# Patient Record
Sex: FEMALE | Race: WHITE | HISPANIC OR LATINO | Employment: FULL TIME | URBAN - METROPOLITAN AREA
[De-identification: names, ages, dates, MRNs, and addresses within clinical notes are randomized per-mention and may not be internally consistent; named-entity substitution may affect disease eponyms.]

---

## 2021-10-01 ENCOUNTER — OFFICE VISIT (OUTPATIENT)
Dept: INTERNAL MEDICINE | Facility: CLINIC | Age: 26
End: 2021-10-01

## 2021-10-01 VITALS
BODY MASS INDEX: 43.54 KG/M2 | HEART RATE: 74 BPM | DIASTOLIC BLOOD PRESSURE: 86 MMHG | RESPIRATION RATE: 18 BRPM | SYSTOLIC BLOOD PRESSURE: 118 MMHG | TEMPERATURE: 97.5 F | HEIGHT: 64 IN | WEIGHT: 255 LBS | OXYGEN SATURATION: 98 %

## 2021-10-01 DIAGNOSIS — Z00.00 ANNUAL PHYSICAL EXAM: Primary | ICD-10-CM

## 2021-10-01 DIAGNOSIS — F90.0 ATTENTION DEFICIT HYPERACTIVITY DISORDER (ADHD), PREDOMINANTLY INATTENTIVE TYPE: ICD-10-CM

## 2021-10-01 DIAGNOSIS — F41.1 GENERALIZED ANXIETY DISORDER: ICD-10-CM

## 2021-10-01 DIAGNOSIS — Z11.59 NEED FOR HEPATITIS C SCREENING TEST: ICD-10-CM

## 2021-10-01 DIAGNOSIS — J45.901 ASTHMA WITH ACUTE EXACERBATION, UNSPECIFIED ASTHMA SEVERITY, UNSPECIFIED WHETHER PERSISTENT: ICD-10-CM

## 2021-10-01 PROBLEM — G89.29 CHRONIC BACK PAIN: Status: ACTIVE | Noted: 2021-10-01

## 2021-10-01 PROBLEM — J45.909 ASTHMA: Status: ACTIVE | Noted: 2017-08-28

## 2021-10-01 PROBLEM — M54.9 CHRONIC BACK PAIN: Status: ACTIVE | Noted: 2021-10-01

## 2021-10-01 PROBLEM — F43.10 PTSD (POST-TRAUMATIC STRESS DISORDER): Status: ACTIVE | Noted: 2018-04-18

## 2021-10-01 PROBLEM — F32.A DEPRESSION: Status: ACTIVE | Noted: 2018-04-18

## 2021-10-01 PROBLEM — F90.9 ADHD: Status: ACTIVE | Noted: 2018-04-18

## 2021-10-01 PROCEDURE — 99385 PREV VISIT NEW AGE 18-39: CPT | Performed by: NURSE PRACTITIONER

## 2021-10-01 PROCEDURE — 99204 OFFICE O/P NEW MOD 45 MIN: CPT | Performed by: NURSE PRACTITIONER

## 2021-10-01 RX ORDER — SUMATRIPTAN 50 MG/1
50 TABLET, FILM COATED ORAL
COMMUNITY
Start: 2020-12-10 | End: 2021-12-10

## 2021-10-01 RX ORDER — TIOTROPIUM BROMIDE INHALATION SPRAY 3.12 UG/1
SPRAY, METERED RESPIRATORY (INHALATION)
COMMUNITY
Start: 2021-06-30 | End: 2021-10-01 | Stop reason: SDUPTHER

## 2021-10-01 RX ORDER — BUPROPION HYDROCHLORIDE 150 MG/1
150 TABLET ORAL DAILY
Qty: 30 TABLET | Refills: 3 | Status: SHIPPED | OUTPATIENT
Start: 2021-10-01 | End: 2021-10-13 | Stop reason: SINTOL

## 2021-10-01 RX ORDER — TIOTROPIUM BROMIDE INHALATION SPRAY 3.12 UG/1
2 SPRAY, METERED RESPIRATORY (INHALATION) DAILY
Qty: 4 G | Refills: 12 | Status: SHIPPED | OUTPATIENT
Start: 2021-10-01

## 2021-10-01 RX ORDER — MONTELUKAST SODIUM 10 MG/1
TABLET ORAL
COMMUNITY
Start: 2021-09-30 | End: 2021-10-01 | Stop reason: SDUPTHER

## 2021-10-01 RX ORDER — MONTELUKAST SODIUM 10 MG/1
10 TABLET ORAL NIGHTLY
Qty: 90 TABLET | Refills: 3 | Status: SHIPPED | OUTPATIENT
Start: 2021-10-01 | End: 2023-02-21

## 2021-10-01 NOTE — PATIENT INSTRUCTIONS
Dr. James Schilling  [address]   [address]   [place] [address]   Phone [phone]     Dr. Tej Shea  [address] [place] [address]   Phone [phone]      Integrative Psychiatry   Dr. Eugenio Woodruff  [address]  [place] [address]  [phone]    Dr. Boogie Tanner  [address]  [place], [place] [address] [phone]     [facility]   [address]   [address]   [place] [address]   Phone [phone]    Dr. Emeterio Mcintosh  [address]  [address]   [place] [address]   Phone [phone]   Fax [phone]     [facility]   [address]  [place] [address]  [phone]    [facility]  [address] [place] [id number] [phone]    Dr. Massiel Delgado  [address]   [address]   [place] [address]   Phone [phone]   Fax [phone]     Dr. Maribell Oswald  [address]  [place], [place] [id number] [phone]    Dr. Abhinav Aponte  [address]   [place] [address]   Phone [phone]   Fax [phone]     [facility]  [address], [place] [address] [phone]    [facility]
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
## 2021-10-04 ENCOUNTER — HOSPITAL ENCOUNTER (EMERGENCY)
Facility: HOSPITAL | Age: 26
Discharge: HOME | End: 2021-10-04
Attending: EMERGENCY MEDICINE | Admitting: EMERGENCY MEDICINE

## 2021-10-04 ENCOUNTER — APPOINTMENT (OUTPATIENT)
Dept: GENERAL RADIOLOGY | Facility: HOSPITAL | Age: 26
End: 2021-10-04

## 2021-10-04 VITALS
HEIGHT: 65 IN | TEMPERATURE: 97.7 F | OXYGEN SATURATION: 98 % | RESPIRATION RATE: 16 BRPM | HEART RATE: 80 BPM | WEIGHT: 255 LBS | DIASTOLIC BLOOD PRESSURE: 74 MMHG | SYSTOLIC BLOOD PRESSURE: 118 MMHG | BODY MASS INDEX: 42.49 KG/M2

## 2021-10-04 DIAGNOSIS — M54.41 LOW BACK PAIN WITH BILATERAL SCIATICA, UNSPECIFIED BACK PAIN LATERALITY, UNSPECIFIED CHRONICITY: Primary | ICD-10-CM

## 2021-10-04 DIAGNOSIS — M54.42 LOW BACK PAIN WITH BILATERAL SCIATICA, UNSPECIFIED BACK PAIN LATERALITY, UNSPECIFIED CHRONICITY: Primary | ICD-10-CM

## 2021-10-04 PROCEDURE — 99283 EMERGENCY DEPT VISIT LOW MDM: CPT

## 2021-10-04 PROCEDURE — 72110 X-RAY EXAM L-2 SPINE 4/>VWS: CPT

## 2021-10-04 PROCEDURE — 63710000001 PREDNISONE PER 1 MG: Performed by: PHYSICIAN ASSISTANT

## 2021-10-04 RX ORDER — CYCLOBENZAPRINE HCL 10 MG
10 TABLET ORAL ONCE
Status: COMPLETED | OUTPATIENT
Start: 2021-10-04 | End: 2021-10-04

## 2021-10-04 RX ORDER — PREDNISONE 20 MG/1
40 TABLET ORAL ONCE
Status: COMPLETED | OUTPATIENT
Start: 2021-10-04 | End: 2021-10-04

## 2021-10-04 RX ORDER — ACETAMINOPHEN 500 MG
1000 TABLET ORAL ONCE
Status: COMPLETED | OUTPATIENT
Start: 2021-10-04 | End: 2021-10-04

## 2021-10-04 RX ORDER — METHYLPREDNISOLONE 4 MG/1
TABLET ORAL
Qty: 21 TABLET | Refills: 0 | Status: SHIPPED | OUTPATIENT
Start: 2021-10-04 | End: 2021-11-05

## 2021-10-04 RX ADMIN — CYCLOBENZAPRINE 10 MG: 10 TABLET, FILM COATED ORAL at 12:26

## 2021-10-04 RX ADMIN — PREDNISONE 40 MG: 20 TABLET ORAL at 12:27

## 2021-10-04 RX ADMIN — ACETAMINOPHEN 1000 MG: 500 TABLET ORAL at 12:27

## 2021-10-04 NOTE — ED TRIAGE NOTES
Pt has chronic back pain and is seen by pain management.  Her pain is worse and she can't sit or walk.  Pain management told her to come to ER.  No new injury    Patient was placed in face mask during first look triage.  Patient was wearing a face mask throughout encounter.  I wore personal protective equipment throughout the encounter.  Hand hygiene was performed before and after patient encounter.
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
## 2021-10-04 NOTE — DISCHARGE INSTRUCTIONS
Please continue to take your previously prescribed Tylenol and Robaxin for your back pain. Take the Medrol dose pack as prescribed. Light activity for the next 3 to 5 days. It is important that you follow-up with your primary care provider later this week if your pain continues.

## 2021-10-05 LAB
ALBUMIN SERPL-MCNC: 4.6 G/DL (ref 3.5–5.2)
ALBUMIN/GLOB SERPL: 1.6 G/DL
ALP SERPL-CCNC: 60 U/L (ref 39–117)
ALT SERPL-CCNC: 44 U/L (ref 1–33)
AST SERPL-CCNC: 29 U/L (ref 1–32)
BASOPHILS # BLD AUTO: 0.03 10*3/MM3 (ref 0–0.2)
BASOPHILS NFR BLD AUTO: 0.4 % (ref 0–1.5)
BILIRUB SERPL-MCNC: 0.2 MG/DL (ref 0–1.2)
BUN SERPL-MCNC: 11 MG/DL (ref 6–20)
BUN/CREAT SERPL: 15.5 (ref 7–25)
CALCIUM SERPL-MCNC: 9.7 MG/DL (ref 8.6–10.5)
CHLORIDE SERPL-SCNC: 100 MMOL/L (ref 98–107)
CHOLEST SERPL-MCNC: 115 MG/DL (ref 0–200)
CO2 SERPL-SCNC: 26 MMOL/L (ref 22–29)
CREAT SERPL-MCNC: 0.71 MG/DL (ref 0.57–1)
EOSINOPHIL # BLD AUTO: 0.16 10*3/MM3 (ref 0–0.4)
EOSINOPHIL NFR BLD AUTO: 2.3 % (ref 0.3–6.2)
ERYTHROCYTE [DISTWIDTH] IN BLOOD BY AUTOMATED COUNT: 13.5 % (ref 12.3–15.4)
GLOBULIN SER CALC-MCNC: 2.8 GM/DL
GLUCOSE SERPL-MCNC: 94 MG/DL (ref 65–99)
HCT VFR BLD AUTO: 40.8 % (ref 34–46.6)
HCV AB S/CO SERPL IA: 0.1 S/CO RATIO (ref 0–0.9)
HCV AB SERPL QL IA: NORMAL
HDLC SERPL-MCNC: 39 MG/DL (ref 40–60)
HGB BLD-MCNC: 13.2 G/DL (ref 12–15.9)
IMM GRANULOCYTES # BLD AUTO: 0.02 10*3/MM3 (ref 0–0.05)
IMM GRANULOCYTES NFR BLD AUTO: 0.3 % (ref 0–0.5)
LDLC SERPL CALC-MCNC: 64 MG/DL (ref 0–100)
LDLC/HDLC SERPL: 1.67 {RATIO}
LYMPHOCYTES # BLD AUTO: 2.02 10*3/MM3 (ref 0.7–3.1)
LYMPHOCYTES NFR BLD AUTO: 29.7 % (ref 19.6–45.3)
MCH RBC QN AUTO: 25.7 PG (ref 26.6–33)
MCHC RBC AUTO-ENTMCNC: 32.4 G/DL (ref 31.5–35.7)
MCV RBC AUTO: 79.5 FL (ref 79–97)
MONOCYTES # BLD AUTO: 0.7 10*3/MM3 (ref 0.1–0.9)
MONOCYTES NFR BLD AUTO: 10.3 % (ref 5–12)
NEUTROPHILS # BLD AUTO: 3.88 10*3/MM3 (ref 1.7–7)
NEUTROPHILS NFR BLD AUTO: 57 % (ref 42.7–76)
NRBC BLD AUTO-RTO: 0 /100 WBC (ref 0–0.2)
PLATELET # BLD AUTO: 261 10*3/MM3 (ref 140–450)
POTASSIUM SERPL-SCNC: 4.2 MMOL/L (ref 3.5–5.2)
PROT SERPL-MCNC: 7.4 G/DL (ref 6–8.5)
RBC # BLD AUTO: 5.13 10*6/MM3 (ref 3.77–5.28)
SODIUM SERPL-SCNC: 140 MMOL/L (ref 136–145)
T4 FREE SERPL-MCNC: 1.52 NG/DL (ref 0.93–1.7)
TRIGL SERPL-MCNC: 55 MG/DL (ref 0–150)
TSH SERPL DL<=0.005 MIU/L-ACNC: 1.37 UIU/ML (ref 0.27–4.2)
VLDLC SERPL CALC-MCNC: 12 MG/DL (ref 5–40)
WBC # BLD AUTO: 6.81 10*3/MM3 (ref 3.4–10.8)

## 2021-10-13 ENCOUNTER — TELEPHONE (OUTPATIENT)
Dept: INTERNAL MEDICINE | Facility: CLINIC | Age: 26
End: 2021-10-13

## 2021-10-13 RX ORDER — BUPROPION HYDROCHLORIDE 75 MG/1
75 TABLET ORAL 2 TIMES DAILY
Qty: 60 TABLET | Refills: 3 | Status: SHIPPED | OUTPATIENT
Start: 2021-10-13 | End: 2022-02-18

## 2021-10-13 NOTE — TELEPHONE ENCOUNTER
wellbutrin changed to 75mg , can start once daily then increase to twice daily as tolerated. Pt had vertigo from 150mg XL daily  Patient notified through my chart  Has a follow up on 11/5/21

## 2021-10-13 NOTE — TELEPHONE ENCOUNTER
----- Message from Dennies Garrick, MA sent at 10/12/2021 10:20 AM EDT -----  Regarding: FW: Prescription Question  Contact: [phone]    ----- Message -----  From: Anayeli Burdick  Sent: 10/12/2021  10:10 AM EDT  To: [facility]  Subject: Prescription Question                            Shan Mixonss[place]    I had to stop the wellbutrin because i was all weekend in bed with nausea & vertigo! Its was terrible. Can we lower the dosage

## 2021-11-05 ENCOUNTER — OFFICE VISIT (OUTPATIENT)
Dept: INTERNAL MEDICINE | Facility: CLINIC | Age: 26
End: 2021-11-05

## 2021-11-05 VITALS
BODY MASS INDEX: 42.65 KG/M2 | OXYGEN SATURATION: 98 % | TEMPERATURE: 97.5 F | WEIGHT: 256 LBS | RESPIRATION RATE: 18 BRPM | HEART RATE: 88 BPM | SYSTOLIC BLOOD PRESSURE: 124 MMHG | HEIGHT: 65 IN | DIASTOLIC BLOOD PRESSURE: 82 MMHG

## 2021-11-05 DIAGNOSIS — F41.1 GENERALIZED ANXIETY DISORDER: Primary | ICD-10-CM

## 2021-11-05 DIAGNOSIS — F43.10 PTSD (POST-TRAUMATIC STRESS DISORDER): ICD-10-CM

## 2021-11-05 PROCEDURE — 99213 OFFICE O/P EST LOW 20 MIN: CPT | Performed by: NURSE PRACTITIONER

## 2021-11-05 NOTE — PROGRESS NOTES
Subjective   Anayeli Burdick is a 26 y.o. female. Patient is here today for   Chief Complaint   Patient presents with   • Med Follow Up   Answers for HPI/ROS submitted by the patient on 11/5/2021  Please describe your symptoms.: Allegry and follo up  Have you had these symptoms before?: No  How long have you been having these symptoms?: 5-7 days  What is the primary reason for your visit?: Other           Vitals:    11/05/21 1313   BP: 124/82   Pulse: 88   Resp: 18   Temp: 97.5 °F (36.4 °C)   SpO2: 98%     Body mass index is 42.6 kg/m².  The following portions of the patient's history were reviewed and updated as appropriate: allergies, current medications, past family history, past medical history, past social history, past surgical history and problem list.    Past Medical History:   Diagnosis Date   • ADHD 1995   • Anxiety 2013   • Asthma 2004   • Back pain 2020   • Depression 2017   • Headache 2013   • Visual impairment 2003      Allergies   Allergen Reactions   • Aripiprazole Anxiety and Mental Status Change   • Aspirin Itching, Swelling and GI Bleeding   • Bee Venom Anaphylaxis, Hives, Itching, Swelling and Rash   • Diclofenac Potassium Hives, Itching, Swelling, Rash and GI Bleeding   • Ibuprofen Itching, Swelling, Rash and GI Bleeding   • Iodinated Diagnostic Agents Hives, Itching, Swelling and Rash   • Iodine Hives, Itching, Swelling and Rash   • Keflex [Cephalexin] Itching, Swelling, Rash and GI Bleeding   • Latex Hives, Itching, Swelling and Rash   • Levofloxacin Hives, Nausea And Vomiting and Swelling   • Oxycodone-Acetaminophen Hives, Itching, Swelling, Rash and GI Bleeding   • Penicillins Itching, Swelling, Rash and GI Bleeding   • Promethazine Hives, Itching, Swelling and Rash   • Quetiapine Anxiety, Mental Status Change and Confusion   • Shrimp Anaphylaxis, Hives, Itching, Swelling and Rash   • Sulfa Antibiotics Hives, Itching, Nausea And Vomiting and Rash   • Tape Itching, Swelling and Rash   •  Tramadol Swelling, Rash and GI Bleeding      Social History     Socioeconomic History   • Marital status:      Spouse name: Hector Burdick   • Number of children: 0   • Highest education level: Bachelor's degree (e.g., BA, AB, BS)   Tobacco Use   • Smoking status: Never Smoker   • Smokeless tobacco: Never Used   Vaping Use   • Vaping Use: Never used   Substance and Sexual Activity   • Alcohol use: Not Currently     Alcohol/week: 0.0 standard drinks   • Drug use: Never   • Sexual activity: Yes     Partners: Male     Birth control/protection: Implant     Comment: Neplaxon        Current Outpatient Medications:   •  buPROPion (WELLBUTRIN) 75 MG tablet, Take 1 tablet by mouth 2 (Two) Times a Day., Disp: 60 tablet, Rfl: 3  •  montelukast (SINGULAIR) 10 MG tablet, Take 1 tablet by mouth Every Night., Disp: 90 tablet, Rfl: 3  •  mupirocin (BACTROBAN) 2 % ointment, apply a small amount to the affected area by topical route 3 times per day for 7 days, Disp: 22 g, Rfl: 0  •  Spiriva Respimat 2.5 MCG/ACT aerosol solution inhaler, Inhale 2 puffs Daily., Disp: 4 g, Rfl: 12  •  SUMAtriptan (IMITREX) 50 MG tablet, Take 50 mg by mouth., Disp: , Rfl:      Objective     History of Present Illness  Anayeli is a 26 year old female patient who is here for a follow up for anxiety. She was started on wellbutrin 150mg XL and could not tolerate it due to side effects. It was decreased to 75mg twice daily but she can  Only tolerate it once daily. When she takes it twice daily , it makes her dizzy. She still has some anxiety but is concentrating better. She was referred to psychiatry for management of ADHD.  She has tried and failed several other medications due to side effects - sertraline and lexapro.       Review of Systems   Respiratory: Negative.    Cardiovascular: Negative.    Gastrointestinal: Negative.    Musculoskeletal: Negative.    Skin: Positive for rash (pt reports she had hives and has taken benadryl, and zyrtec with some  improvement , still has a spot on her face ).   Allergic/Immunologic: Positive for environmental allergies.       Physical Exam  Vitals and nursing note reviewed.   Constitutional:       General: She is not in acute distress.     Appearance: Normal appearance. She is well-developed and well-groomed.   HENT:      Head: Normocephalic.   Cardiovascular:      Rate and Rhythm: Normal rate and regular rhythm.   Pulmonary:      Effort: Pulmonary effort is normal.      Breath sounds: Normal breath sounds.   Neurological:      Mental Status: She is alert and oriented to person, place, and time.         ASSESSMENT     Problems Addressed this Visit     PTSD (post-traumatic stress disorder)      Other Visit Diagnoses     Generalized anxiety disorder    -  Primary      Diagnoses       Codes Comments    Generalized anxiety disorder    -  Primary ICD-10-CM: F41.1  ICD-9-CM: 300.02     PTSD (post-traumatic stress disorder)     ICD-10-CM: F43.10  ICD-9-CM: 309.81           PLAN    Continue wellbutrin 75mg   Will get a gene sight test which may help with choosing medication in the future  Recommend that she follow up with her allergist for testing if hives are recurrent, continue zyrtec

## 2021-11-15 ENCOUNTER — TELEPHONE (OUTPATIENT)
Dept: INTERNAL MEDICINE | Facility: CLINIC | Age: 26
End: 2021-11-15

## 2021-11-15 NOTE — TELEPHONE ENCOUNTER
Patient needs a note today stating she can get the flu shot with her allergies so she does not get fired. Please call patient asap when letter is completed.

## 2021-11-15 NOTE — TELEPHONE ENCOUNTER
Pt did not want an exemption at this time.   Letter written for patient so that she can receive flu vaccine

## 2021-11-15 NOTE — TELEPHONE ENCOUNTER
Caller: Anayeli Burdick    Relationship to patient: Self    Best call back number: [phone]    Patient is needing: PT FAXED OVER ON 11/13 A FORM FOR THE FLU SHOT REGARDING HER BEING UNABLE TO TAKE IT DUE TO MEDICAL ISSUES. SHE WOULD LIKE TO KNOW IF IT WAS RECEIVED AND IF SO, IF IT WAS SIGNED BY KRISTOPHER SANCHEZ

## 2021-11-15 NOTE — TELEPHONE ENCOUNTER
Called patient, no answer.  Patient came in and we wrote her a note stating it's ok for her to get the flu shot.

## 2021-12-03 ENCOUNTER — OFFICE VISIT (OUTPATIENT)
Dept: BARIATRICS/WEIGHT MGMT | Facility: CLINIC | Age: 26
End: 2021-12-03

## 2021-12-03 VITALS
WEIGHT: 260 LBS | BODY MASS INDEX: 44.39 KG/M2 | HEART RATE: 86 BPM | DIASTOLIC BLOOD PRESSURE: 88 MMHG | TEMPERATURE: 97.5 F | SYSTOLIC BLOOD PRESSURE: 140 MMHG | RESPIRATION RATE: 18 BRPM | HEIGHT: 64 IN

## 2021-12-03 DIAGNOSIS — F32.A DEPRESSION, UNSPECIFIED DEPRESSION TYPE: ICD-10-CM

## 2021-12-03 DIAGNOSIS — E66.01 OBESITY, CLASS III, BMI 40-49.9 (MORBID OBESITY) (HCC): Primary | ICD-10-CM

## 2021-12-03 DIAGNOSIS — F90.0 ATTENTION DEFICIT HYPERACTIVITY DISORDER (ADHD), PREDOMINANTLY INATTENTIVE TYPE: ICD-10-CM

## 2021-12-03 DIAGNOSIS — J45.901 ASTHMA WITH ACUTE EXACERBATION, UNSPECIFIED ASTHMA SEVERITY, UNSPECIFIED WHETHER PERSISTENT: ICD-10-CM

## 2021-12-03 DIAGNOSIS — Z71.3 DIETARY COUNSELING: ICD-10-CM

## 2021-12-03 DIAGNOSIS — M54.50 CHRONIC BILATERAL LOW BACK PAIN, UNSPECIFIED WHETHER SCIATICA PRESENT: ICD-10-CM

## 2021-12-03 DIAGNOSIS — G89.29 CHRONIC BILATERAL LOW BACK PAIN, UNSPECIFIED WHETHER SCIATICA PRESENT: ICD-10-CM

## 2021-12-03 DIAGNOSIS — F43.10 PTSD (POST-TRAUMATIC STRESS DISORDER): ICD-10-CM

## 2021-12-03 PROBLEM — E66.813 OBESITY, CLASS III, BMI 40-49.9 (MORBID OBESITY): Status: ACTIVE | Noted: 2021-12-03

## 2021-12-03 PROCEDURE — 99213 OFFICE O/P EST LOW 20 MIN: CPT | Performed by: NURSE PRACTITIONER

## 2021-12-03 NOTE — PROGRESS NOTES
[facility]  [address] [address] [phone]  [address] [address] [phone]  Dept: [phone]  12/3/2021      Anayeli Burdick.  [id number]  [id number]  1995  female      Chief Complaint of weight gain; unable to maintain weight loss    History of Present Illness:   Anayeli is a 26 y.o. female who presents today for evaluation, education and consultation regarding medical weight loss. She has been trying a low carbohydrate diet but has not found much success with that.  She c/o hunger throughout the day.  She is trying to increase her protein intake.  She has noticed a change in her endurance level.  She drinks mostly water and has cut back on liquid calories.  She finds that she has a tendency towards emotional eating.      Patient Active Problem List   Diagnosis   • ADHD   • Asthma   • Depression   • PTSD (post-traumatic stress disorder)   • Chronic back pain   • Obesity, Class III, BMI 40-49.9 (morbid obesity) ([facility])   • Dietary counseling       Past Medical History:   Diagnosis Date   • ADHD 1995   • Anxiety 2013   • Asthma 2004   • Back pain 2020   • Depression 2017   • Headache 2013   • Visual impairment 2003       Past Surgical History:   Procedure Laterality Date   • FOOT SURGERY Left 2009    left foot had non cancer mass   • FOOT SURGERY Left 2010    left foot had non cancer mass again   • NOSE SURGERY Bilateral 08/17/2021   • SINUS SURGERY  2021   • TONSILLECTOMY AND ADENOIDECTOMY Bilateral 12/2020   • WISDOM TOOTH EXTRACTION Bilateral 2017       Allergies   Allergen Reactions   • Aripiprazole Anxiety and Mental Status Change   • Aspirin Itching, Swelling and GI Bleeding   • Bee Venom Anaphylaxis, Hives, Itching, Swelling and Rash   • Diclofenac Potassium Hives, Itching, Swelling, Rash and GI Bleeding   • Ibuprofen Itching, Swelling, Rash and GI Bleeding   • Iodinated Diagnostic  Agents Hives, Itching, Swelling and Rash   • Iodine Hives, Itching, Swelling and Rash   • Keflex [Cephalexin] Itching, Swelling, Rash and GI Bleeding   • Latex Hives, Itching, Swelling and Rash   • Levofloxacin Hives, Nausea And Vomiting and Swelling   • Oxycodone-Acetaminophen Hives, Itching, Swelling, Rash and GI Bleeding   • Penicillins Itching, Swelling, Rash and GI Bleeding   • Promethazine Hives, Itching, Swelling and Rash   • Quetiapine Anxiety, Mental Status Change and Confusion   • Shrimp Anaphylaxis, Hives, Itching, Swelling and Rash   • Sulfa Antibiotics Hives, Itching, Nausea And Vomiting and Rash   • Tape Itching, Swelling and Rash   • Tramadol Swelling, Rash and GI Bleeding         Current Outpatient Medications:   •  buPROPion (WELLBUTRIN) 75 MG tablet, Take 1 tablet by mouth 2 (Two) Times a Day., Disp: 60 tablet, Rfl: 3  •  montelukast (SINGULAIR) 10 MG tablet, Take 1 tablet by mouth Every Night., Disp: 90 tablet, Rfl: 3  •  mupirocin (BACTROBAN) 2 % ointment, apply a small amount to the affected area by topical route 3 times per day for 7 days, Disp: 22 g, Rfl: 0  •  Spiriva Respimat 2.5 MCG/ACT aerosol solution inhaler, Inhale 2 puffs Daily., Disp: 4 g, Rfl: 12  •  SUMAtriptan (IMITREX) 50 MG tablet, Take 50 mg by mouth., Disp: , Rfl:     Social History     Socioeconomic History   • Marital status:      Spouse name: Hector Burdick   • Number of children: 0   • Highest education level: Bachelor's degree (e.g., BA, AB, BS)   Tobacco Use   • Smoking status: Never Smoker   • Smokeless tobacco: Never Used   Vaping Use   • Vaping Use: Never used   Substance and Sexual Activity   • Alcohol use: Not Currently     Alcohol/week: 0.0 standard drinks   • Drug use: Never   • Sexual activity: Yes     Partners: Male     Birth control/protection: Implant     Comment: Neplaxon       Family History   Problem Relation Age of Onset   • Multiple sclerosis Mother    • Thyroid disease Mother    • Calcium disorder  Mother    • Cataracts Mother    • Cancer Mother    • Thyroid nodules Mother    • Stroke Mother    • Osteoporotic fracture Mother    • Heart attack Father    • Heart disease Father    • Parkinsonism Father    • Abnormal EKG Father    • Allergies Father    • Post-traumatic stress disorder Father    • Stroke Father    • Thyroid disease Brother    • Calcium disorder Brother    • Vision loss Brother    • Kidney disease Maternal Grandmother    • Kidney failure Maternal Grandmother    • Cancer Maternal Grandfather    • Throat cancer Maternal Grandfather    • Stroke Paternal Grandmother    • Heart disease Paternal Grandmother    • Cancer Paternal Grandfather          Review of Systems:  Review of Systems   Constitutional: Positive for activity change, appetite change, fatigue and unexpected weight change.   Musculoskeletal: Positive for arthralgias and back pain.   All other systems reviewed and are negative.      Physical Exam:  Vital Signs:  Weight: 118 kg (260 lb)   Body mass index is 44.63 kg/m².  Temp: 97.5 °F (36.4 °C)   Heart Rate: 86   BP: 140/88     Physical Exam  Vitals reviewed.   Constitutional:       Appearance: Normal appearance. She is obese.   HENT:      Head: Normocephalic and atraumatic.      Mouth/Throat:      Mouth: Mucous membranes are moist.   Eyes:      General: No scleral icterus.     Extraocular Movements: Extraocular movements intact.      Conjunctiva/sclera: Conjunctivae normal.      Pupils: Pupils are equal, round, and reactive to light.   Cardiovascular:      Rate and Rhythm: Normal rate and regular rhythm.   Pulmonary:      Effort: Pulmonary effort is normal. No respiratory distress.   Chest:      Chest wall: No tenderness.   Abdominal:      General: There is no distension.      Palpations: Abdomen is soft. There is no mass.      Tenderness: There is no abdominal tenderness. There is no guarding.   Musculoskeletal:         General: Normal range of motion.      Cervical back: Normal range of  motion and neck supple.   Skin:     General: Skin is warm and dry.   Neurological:      General: No focal deficit present.      Mental Status: She is alert and oriented to person, place, and time.   Psychiatric:         Mood and Affect: Mood normal.         Behavior: Behavior normal.         Thought Content: Thought content normal.         Judgment: Judgment normal.            Assessment:         Anayeli Burdick is a 26 y.o. year old female with obesity. Weight: 118 kg (260 lb), Body mass index is 44.63 kg/m².     Encounter Diagnoses   Name Primary?   • Obesity, Class III, BMI 40-49.9 (morbid obesity) ([facility]) Yes   • Dietary counseling    • Attention deficit hyperactivity disorder (ADHD), predominantly inattentive type    • Depression, unspecified depression type    • PTSD (post-traumatic stress disorder)    • Chronic bilateral low back pain, unspecified whether sciatica present    • Asthma with acute exacerbation, unspecified asthma severity, unspecified whether persistent        Plan:  The patient was advised to start a high protein, low fat and low carbohydrate diet  start routine exercise including but not limited to 150 minutes per week.   Discussed adding fiber to her diet via supplement such as benefiber.   Discussed beginning Wegovy.  Discussed risks, benefits, as well as side effects.  demonstrated how to use pen in office.  Patient was able to demonstrate as well.      Total time spent during this encounter today was 30 minutes and includes preparing for the visit, performing a medically appropriate examination and/or evaluations, counseling and educating the patient/family/caregiver, ordering medications, tests, or procedures, documenting information in the medical record and independently interpreting results and communicating that information with the patient/family/caregiver.    Kell Metz, APRN  12/3/2021

## 2021-12-23 ENCOUNTER — TELEPHONE (OUTPATIENT)
Dept: NEUROSURGERY | Facility: CLINIC | Age: 26
End: 2021-12-23

## 2021-12-23 NOTE — TELEPHONE ENCOUNTER
Caller: Anayeli Burdick  Relationship: Self  Best call back number:     What was the call regarding:     PATIENT CALLED IN TO ASK ABOUT OUR PROCESS TO GET AN APPOINTMENT, I LET HER KNOW THAT WE DO REQUIRE A REFERRAL BUT THAT HIS CAN COME FROM PCP. PATIENT'S PCP IS WITH [facility].     PATIENT HAD MRI COMPLETED AT [facility] IN THE SUMMER, ONCE REFERRAL IS RECEIVED WE WILL SEND A REQUEST TO THEM FOR THIS IMAGING REPORT. XR LUMBAR IS IN CHART 10/04/2021.  PATIENT NOT LOCATED IN [facility].

## 2021-12-27 RX ORDER — SEMAGLUTIDE 0.5 MG/.5ML
0.5 INJECTION, SOLUTION SUBCUTANEOUS WEEKLY
Qty: 2 ML | Refills: 0 | Status: SHIPPED | OUTPATIENT
Start: 2021-12-27 | End: 2022-01-18 | Stop reason: SDUPTHER

## 2022-01-14 ENCOUNTER — TELEPHONE (OUTPATIENT)
Dept: INTERNAL MEDICINE | Facility: CLINIC | Age: 27
End: 2022-01-14

## 2022-01-14 DIAGNOSIS — G89.29 CHRONIC BILATERAL LOW BACK PAIN, UNSPECIFIED WHETHER SCIATICA PRESENT: Primary | ICD-10-CM

## 2022-01-14 DIAGNOSIS — M51.36 DEGENERATIVE DISC DISEASE, LUMBAR: ICD-10-CM

## 2022-01-14 DIAGNOSIS — M54.50 CHRONIC BILATERAL LOW BACK PAIN, UNSPECIFIED WHETHER SCIATICA PRESENT: Primary | ICD-10-CM

## 2022-01-14 NOTE — TELEPHONE ENCOUNTER
Pt requests referral to Dr Dwyer for back pain. She had an MRI of the lumbar spine ordered by Dr Valdes.   We received report which is scanned into epic.   Please notify patient that referral was placed

## 2022-01-18 RX ORDER — SEMAGLUTIDE 0.5 MG/.5ML
0.5 INJECTION, SOLUTION SUBCUTANEOUS WEEKLY
Qty: 2 ML | Refills: 0 | Status: SHIPPED | OUTPATIENT
Start: 2022-01-18 | End: 2022-01-19

## 2022-01-19 ENCOUNTER — TELEPHONE (OUTPATIENT)
Dept: INTERNAL MEDICINE | Facility: CLINIC | Age: 27
End: 2022-01-19

## 2022-01-19 RX ORDER — SEMAGLUTIDE 1 MG/.5ML
1 INJECTION, SOLUTION SUBCUTANEOUS WEEKLY
Qty: 2 ML | Refills: 0 | Status: SHIPPED | OUTPATIENT
Start: 2022-01-19 | End: 2022-02-10

## 2022-01-19 RX ORDER — ALBUTEROL SULFATE 90 UG/1
2 AEROSOL, METERED RESPIRATORY (INHALATION) EVERY 4 HOURS PRN
Qty: 8 G | Refills: 0 | Status: SHIPPED | OUTPATIENT
Start: 2022-01-19

## 2022-01-19 RX ORDER — ALBUTEROL SULFATE 2.5 MG/3ML
2.5 SOLUTION RESPIRATORY (INHALATION) EVERY 4 HOURS PRN
Qty: 270 ML | Refills: 12 | Status: SHIPPED | OUTPATIENT
Start: 2022-01-19

## 2022-01-19 NOTE — TELEPHONE ENCOUNTER
----- Message from Janis Cook MA sent at 1/19/2022  7:51 AM EST -----  Regarding: FW: Hello  Not on med list, ok to refill?   ----- Message -----  From: Anayeli Burdick  Sent: 1/19/2022   7:05 AM EST  To: [facility]  Subject: Hello                                            Antoni [email or web address]    I have a little problem, i'm out of my albuterol for my nebulizer and my inhaler(albuterol). I only have my spiriva for daily use. Can you please send RX for me.

## 2022-01-20 ENCOUNTER — APPOINTMENT (OUTPATIENT)
Dept: GENERAL RADIOLOGY | Facility: HOSPITAL | Age: 27
End: 2022-01-20

## 2022-01-20 ENCOUNTER — HOSPITAL ENCOUNTER (EMERGENCY)
Facility: HOSPITAL | Age: 27
Discharge: HOME | End: 2022-01-20
Attending: EMERGENCY MEDICINE | Admitting: EMERGENCY MEDICINE

## 2022-01-20 VITALS
DIASTOLIC BLOOD PRESSURE: 51 MMHG | TEMPERATURE: 99.7 F | OXYGEN SATURATION: 96 % | HEART RATE: 113 BPM | RESPIRATION RATE: 18 BRPM | HEIGHT: 64 IN | SYSTOLIC BLOOD PRESSURE: 109 MMHG | BODY MASS INDEX: 44.63 KG/M2

## 2022-01-20 DIAGNOSIS — R06.00 DYSPNEA, UNSPECIFIED TYPE: ICD-10-CM

## 2022-01-20 DIAGNOSIS — D69.6 THROMBOCYTOPENIA: ICD-10-CM

## 2022-01-20 DIAGNOSIS — U07.1 COVID-19: Primary | ICD-10-CM

## 2022-01-20 LAB
B PARAPERT DNA SPEC QL NAA+PROBE: NOT DETECTED
B PERT DNA SPEC QL NAA+PROBE: NOT DETECTED
C PNEUM DNA NPH QL NAA+NON-PROBE: NOT DETECTED
DEPRECATED RDW RBC AUTO: 42.2 FL (ref 37–54)
ERYTHROCYTE [DISTWIDTH] IN BLOOD BY AUTOMATED COUNT: 15.1 % (ref 12.3–15.4)
FLUAV SUBTYP SPEC NAA+PROBE: NOT DETECTED
FLUBV RNA ISLT QL NAA+PROBE: NOT DETECTED
HADV DNA SPEC NAA+PROBE: NOT DETECTED
HCG SERPL QL: NEGATIVE
HCOV 229E RNA SPEC QL NAA+PROBE: NOT DETECTED
HCOV HKU1 RNA SPEC QL NAA+PROBE: NOT DETECTED
HCOV NL63 RNA SPEC QL NAA+PROBE: NOT DETECTED
HCOV OC43 RNA SPEC QL NAA+PROBE: NOT DETECTED
HCT VFR BLD AUTO: 43.1 % (ref 34–46.6)
HGB BLD-MCNC: 14.1 G/DL (ref 12–15.9)
HMPV RNA NPH QL NAA+NON-PROBE: NOT DETECTED
HPIV1 RNA ISLT QL NAA+PROBE: NOT DETECTED
HPIV2 RNA SPEC QL NAA+PROBE: NOT DETECTED
HPIV3 RNA NPH QL NAA+PROBE: NOT DETECTED
HPIV4 P GENE NPH QL NAA+PROBE: NOT DETECTED
LYMPHOCYTES # BLD MANUAL: 0.62 10*3/MM3 (ref 0.7–3.1)
LYMPHOCYTES NFR BLD MANUAL: 5.2 % (ref 5–12)
M PNEUMO IGG SER IA-ACNC: NOT DETECTED
MCH RBC QN AUTO: 25.9 PG (ref 26.6–33)
MCHC RBC AUTO-ENTMCNC: 32.7 G/DL (ref 31.5–35.7)
MCV RBC AUTO: 79.2 FL (ref 79–97)
METAMYELOCYTES NFR BLD MANUAL: 1 % (ref 0–0)
MONOCYTES # BLD: 0.28 10*3/MM3 (ref 0.1–0.9)
NEUTROPHILS # BLD AUTO: 4.41 10*3/MM3 (ref 1.7–7)
NEUTROPHILS NFR BLD MANUAL: 82.3 % (ref 42.7–76)
PLAT MORPH BLD: NORMAL
PLATELET # BLD AUTO: 82 10*3/MM3 (ref 140–450)
QT INTERVAL: 335 MS
RBC # BLD AUTO: 5.44 10*6/MM3 (ref 3.77–5.28)
RBC MORPH BLD: NORMAL
RHINOVIRUS RNA SPEC NAA+PROBE: NOT DETECTED
RSV RNA NPH QL NAA+NON-PROBE: NOT DETECTED
SARS-COV-2 RNA NPH QL NAA+NON-PROBE: DETECTED
TROPONIN T SERPL-MCNC: <0.01 NG/ML (ref 0–0.03)
VARIANT LYMPHS NFR BLD MANUAL: 11.5 % (ref 19.6–45.3)
WBC MORPH BLD: NORMAL
WBC NRBC COR # BLD: 5.36 10*3/MM3 (ref 3.4–10.8)

## 2022-01-20 PROCEDURE — 71045 X-RAY EXAM CHEST 1 VIEW: CPT

## 2022-01-20 PROCEDURE — 93010 ELECTROCARDIOGRAM REPORT: CPT | Performed by: INTERNAL MEDICINE

## 2022-01-20 PROCEDURE — 84703 CHORIONIC GONADOTROPIN ASSAY: CPT | Performed by: EMERGENCY MEDICINE

## 2022-01-20 PROCEDURE — 84484 ASSAY OF TROPONIN QUANT: CPT | Performed by: EMERGENCY MEDICINE

## 2022-01-20 PROCEDURE — 85025 COMPLETE CBC W/AUTO DIFF WBC: CPT | Performed by: NURSE PRACTITIONER

## 2022-01-20 PROCEDURE — 0202U NFCT DS 22 TRGT SARS-COV-2: CPT | Performed by: PHYSICIAN ASSISTANT

## 2022-01-20 PROCEDURE — 85007 BL SMEAR W/DIFF WBC COUNT: CPT | Performed by: NURSE PRACTITIONER

## 2022-01-20 PROCEDURE — 93005 ELECTROCARDIOGRAM TRACING: CPT | Performed by: NURSE PRACTITIONER

## 2022-01-20 PROCEDURE — 99283 EMERGENCY DEPT VISIT LOW MDM: CPT

## 2022-01-20 RX ORDER — BENZONATATE 100 MG/1
200 CAPSULE ORAL 3 TIMES DAILY PRN
Qty: 42 CAPSULE | Refills: 0 | Status: SHIPPED | OUTPATIENT
Start: 2022-01-20 | End: 2022-01-28 | Stop reason: SDUPTHER

## 2022-01-20 RX ORDER — ACETAMINOPHEN 500 MG
1000 TABLET ORAL ONCE
Status: COMPLETED | OUTPATIENT
Start: 2022-01-20 | End: 2022-01-20

## 2022-01-20 RX ORDER — SODIUM CHLORIDE 0.9 % (FLUSH) 0.9 %
10 SYRINGE (ML) INJECTION AS NEEDED
Status: DISCONTINUED | OUTPATIENT
Start: 2022-01-20 | End: 2022-01-20 | Stop reason: HOSPADM

## 2022-01-20 RX ADMIN — ACETAMINOPHEN 1000 MG: 500 TABLET ORAL at 06:27

## 2022-01-20 RX ADMIN — SODIUM CHLORIDE 1000 ML: 9 INJECTION, SOLUTION INTRAVENOUS at 09:32

## 2022-01-20 NOTE — ED PROVIDER NOTES
EMERGENCY DEPARTMENT ENCOUNTER    Room Number:  09/09  Date of encounter:  1/20/2022  PCP: Tonya Marie APRN  Historian: Patient      PPE    Patient was placed in face mask in first look. Patient was wearing facemask when I entered the room and throughout our encounter. I wore full protective equipment throughout this patient encounter including a CAPR face mask, eye shield, gown and gloves. Hand hygiene was performed before donning protective equipment and after removal when leaving the room.        HPI:  Chief Complaint: Shortness of breath  A complete HPI/ROS/PMH/PSH/SH/FH are unobtainable due to: Nothing    Context: Anayeli Burdick is a 26 y.o. female who arrives to the ED via private vehicle.  Patient presents with c/o moderate, constant, shortness of breath since Tuesday.   Patient also complains of nonproductive cough, fever, chills, nausea, sore throat that started on Tuesday.  Patient denies  vomiting, loss of taste or smell, chest pain.  Patient states that nothing makes the symptoms better and nothing worsens symptoms.  Is currently on her menstrual cycle.  She has had both COVID-19 vaccines.  She states she does have a history of asthma and has been out of her nebulizer meds due to them not being in stock at the pharmacy.        PAST MEDICAL HISTORY  Active Ambulatory Problems     Diagnosis Date Noted   • ADHD 04/18/2018   • Asthma 08/28/2017   • Depression 04/18/2018   • PTSD (post-traumatic stress disorder) 04/18/2018   • Chronic back pain 10/01/2021   • Obesity, Class III, BMI 40-49.9 (morbid obesity) ([facility]) 12/03/2021   • Dietary counseling 12/03/2021     Resolved Ambulatory Problems     Diagnosis Date Noted   • No Resolved Ambulatory Problems     Past Medical History:   Diagnosis Date   • Anxiety 2013   • Back pain 2020   • Headache 2013   • Visual impairment 2003         PAST SURGICAL HISTORY  Past Surgical History:   Procedure Laterality Date   • FOOT SURGERY Left 2009    left foot had non  cancer mass   • FOOT SURGERY Left 2010    left foot had non cancer mass again   • NOSE SURGERY Bilateral 08/17/2021   • SINUS SURGERY  2021   • TONSILLECTOMY AND ADENOIDECTOMY Bilateral 12/2020   • WISDOM TOOTH EXTRACTION Bilateral 2017         FAMILY HISTORY  Family History   Problem Relation Age of Onset   • Multiple sclerosis Mother    • Thyroid disease Mother    • Calcium disorder Mother    • Cataracts Mother    • Cancer Mother    • Thyroid nodules Mother    • Stroke Mother    • Osteoporotic fracture Mother    • Heart attack Father    • Heart disease Father    • Parkinsonism Father    • Abnormal EKG Father    • Allergies Father    • Post-traumatic stress disorder Father    • Stroke Father    • Thyroid disease Brother    • Calcium disorder Brother    • Vision loss Brother    • Kidney disease Maternal Grandmother    • Kidney failure Maternal Grandmother    • Cancer Maternal Grandfather    • Throat cancer Maternal Grandfather    • Stroke Paternal Grandmother    • Heart disease Paternal Grandmother    • Cancer Paternal Grandfather          SOCIAL HISTORY  Social History     Socioeconomic History   • Marital status:      Spouse name: Hector Burdick   • Number of children: 0   • Highest education level: Bachelor's degree (e.g., BA, AB, BS)   Tobacco Use   • Smoking status: Never Smoker   • Smokeless tobacco: Never Used   Vaping Use   • Vaping Use: Never used   Substance and Sexual Activity   • Alcohol use: Not Currently     Alcohol/week: 0.0 standard drinks   • Drug use: Never   • Sexual activity: Yes     Partners: Male     Birth control/protection: Implant     Comment: [employer]         ALLERGIES  Aripiprazole, Aspirin, Bee venom, Diclofenac potassium, Ibuprofen, Iodinated diagnostic agents, Iodine, Keflex [cephalexin], Latex, Levofloxacin, Oxycodone-acetaminophen, Penicillins, Promethazine, Quetiapine, Shrimp, Sulfa antibiotics, Tape, and Tramadol        REVIEW OF SYSTEMS  Review of Systems     All systems  reviewed and negative except for those discussed in HPI.        PHYSICAL EXAM    ED Triage Vitals [01/20/22 0335]   Temp Heart Rate Resp BP SpO2   (!) 102.6 °F (39.2 °C) (!) 148 18 136/89 100 %       Physical Exam  GENERAL: Well appearing, nontoxic appearing, not distressed, speaking in full sentences  HENT: normocephalic, atraumatic  No erythema, exudate or swelling noted to the oropharynx  EYES: no scleral icterus, PERRL  CV: regular rhythm, tachycardic, no murmur  RESPIRATORY: normal effort, CTAB  ABDOMEN: soft, nontender  MUSCULOSKELETAL: no deformity  NEURO: alert, moves all extremities, follows commands, mental status normal/baseline  SKIN: warm, dry, no rash   Psych: Appropriate mood and affect  Nursing notes and vital signs reviewed      LAB RESULTS  Recent Results (from the past 24 hour(s))   Respiratory Panel PCR w/COVID-19(SARS-CoV-2) [facility] In-House, NP Swab in UTM/VTM, 3-4 HR TAT - Swab, Nasopharynx    Collection Time: 01/20/22  5:18 AM    Specimen: Nasopharynx; Swab   Result Value Ref Range    ADENOVIRUS, PCR Not Detected Not Detected    Coronavirus 229E Not Detected Not Detected    Coronavirus HKU1 Not Detected Not Detected    Coronavirus NL63 Not Detected Not Detected    Coronavirus OC43 Not Detected Not Detected    COVID19 Detected (C) Not Detected - Ref. Range    Human Metapneumovirus Not Detected Not Detected    Human Rhinovirus/Enterovirus Not Detected Not Detected    Influenza A PCR Not Detected Not Detected    Influenza B PCR Not Detected Not Detected    Parainfluenza Virus 1 Not Detected Not Detected    Parainfluenza Virus 2 Not Detected Not Detected    Parainfluenza Virus 3 Not Detected Not Detected    Parainfluenza Virus 4 Not Detected Not Detected    RSV, PCR Not Detected Not Detected    Bordetella pertussis pcr Not Detected Not Detected    Bordetella parapertussis PCR Not Detected Not Detected    Chlamydophila pneumoniae PCR Not Detected Not Detected    Mycoplasma  pneumo by PCR Not Detected Not Detected   CBC Auto Differential    Collection Time: 01/20/22  9:29 AM    Specimen: Blood   Result Value Ref Range    WBC 5.36 3.40 - 10.80 10*3/mm3    RBC 5.44 (H) 3.77 - 5.28 10*6/mm3    Hemoglobin 14.1 12.0 - 15.9 g/dL    Hematocrit 43.1 34.0 - 46.6 %    MCV 79.2 79.0 - 97.0 fL    MCH 25.9 (L) 26.6 - 33.0 pg    MCHC 32.7 31.5 - 35.7 g/dL    RDW 15.1 12.3 - 15.4 %    RDW-SD 42.2 37.0 - 54.0 fl    Platelets 82 (L) 140 - 450 10*3/mm3   hCG, Serum, Qualitative    Collection Time: 01/20/22  9:29 AM    Specimen: Blood   Result Value Ref Range    HCG Qualitative Negative Negative   Manual Differential    Collection Time: 01/20/22  9:29 AM    Specimen: Blood   Result Value Ref Range    Neutrophil % 82.3 (H) 42.7 - 76.0 %    Lymphocyte % 11.5 (L) 19.6 - 45.3 %    Monocyte % 5.2 5.0 - 12.0 %    Metamyelocyte % 1.0 (H) 0.0 - 0.0 %    Neutrophils Absolute 4.41 1.70 - 7.00 10*3/mm3    Lymphocytes Absolute 0.62 (L) 0.70 - 3.10 10*3/mm3    Monocytes Absolute 0.28 0.10 - 0.90 10*3/mm3    RBC Morphology Normal Normal    WBC Morphology Normal Normal    Platelet Morphology Normal Normal   ECG 12 Lead    Collection Time: 01/20/22  9:44 AM   Result Value Ref Range    QT Interval 335 ms   Troponin    Collection Time: 01/20/22 10:20 AM    Specimen: Blood   Result Value Ref Range    Troponin T <0.010 0.000 - 0.030 ng/mL       Ordered the above labs and independently reviewed the results.      RADIOLOGY  XR Chest 1 View    Result Date: 1/20/2022  Patient: CYNTHIA CORTES  Time Out: 05:41 Exam(s): FILM CXR 1 VIEW EXAM:   XR Chest, 1 View CLINICAL HISTORY:    Reason for exam: FUO. TECHNIQUE:   Frontal view of the chest. COMPARISON:   No relevant prior studies available. FINDINGS:   Lungs:  Slightly diminished lung volumes.  No definite focal consolidation.  The pulmonary vasculature is unremarkable.   Pleural space:  Unremarkable.  No pneumothorax. No large pleural effusion.   Heart:  Unremarkable.  No  cardiomegaly.   Mediastinum:  No significant abnormality identified. The trachea is midline.   Bones joints:  Unremarkable. IMPRESSION:       No focal consolidation or acute cardiopulmonary process identified.     Electronically signed by Isiah Mena MD on 01-20-22 at 0541      I ordered the above noted radiological studies and viewed the images on the PACS system.         MEDICAL RECORD REVIEW  Medical records reviewed in epic      PROCEDURES    Procedures        DIFFERENTIAL DIAGNOSIS  Differential diagnosis include but are not limited to the following: Viral illness, COVID-19, COVID-19 pneumonia, asthma exacerbation, pharyngitis      PROGRESS, DATA ANALYSIS, CONSULTS, AND MEDICAL DECISION MAKING        ED Course as of 01/20/22 1318   Thu Jan 20, 2022   0830 Patient updated on her positive COVID-19 result done today.  We will give patient a liter of normal saline to help get her heart rate down, otherwise patient is not hypoxic, well-appearing and would be stable for discharge.  Patient is agreeable to the above plan. [MS]   0853 , O2 sats 100% on room air.  Will check basic labs on patient. She denies chest pain at this time. [MS]   0855 I viewed the patient's chest imaging in PACS.  My interpretation is no infiltrate or bony abnormality.  See dictation for official radiology interpretation.     [MS]   0855 Reviewed pt's history and workup with Dr. Hernandez.  After a bedside evaluation, they agree with the plan of care.       [MS]   0931 COVID19(!!): Detected [MS]   1038 BP: 122/55 [MS]   1039 Heart Rate: 113 [MS]   1039 SpO2: 96 % [MS]   1058 Temp: 99.7 °F (37.6 °C)  , resting comfortably. [MS]   1140 Patient is CoVID-19 positive but is well-appearing, satting fine on room air.  Patient has ambulated and maintained her sats without difficulty.  EKG showed no acute findings, troponin negative.  Incidental finding of thrombocytopenia but otherwise ED work-up is unremarkable.  Patient is  well-appearing and appears appropriate for discharge with outpatient follow-up.  Given extensive discussion return precautions, discussed need to quarantine, discharging. [JG]   1141 The patient was reexamined.  They have had symptomatic improvement during their ED stay.  I discussed today's findings with the patient, explaining the pertinent positives and negatives from today's visit, and the plan of care.  Discussed plan for discharge as there is no emergent indication for admission.  Discussed limitation of the ED work-up and that this is to rule out life-threatening emergencies but that they could require further testing as determined by their primary care and or any referred specialist patient is agreeable and understands need for follow-up and repeat exam/testing.  Patient is aware that discharge does not mean there is nothing wrong, indicates no emergency is present, and that they must continue their care with their primary care physician and/or any referred specialist.  They were given appropriate follow-up with their primary care physician and/or specialist.  I had an extensive discussion on the expected clinical course and return precautions.  Patient understands to return to the emergency department for continuation, worsening, or new symptoms.  I answered any of the patient's questions. Patient was discharged home in a stable condition.     [JG]      ED Course User Index  [JG] Heraclio Hernandez MD  [MS] Carin Iglesias APRN     Discussed plan for discharge, as there is no emergent indication for admission. Pt/family is agreeable and understands need for follow up and repeat testing.  Pt is aware that discharge does not mean that nothing is wrong but it indicates no emergency is present that requires admission and they must continue care with follow-up as given below or physician of their choice.   Patient/Family voiced understanding of above instructions.  Patient discharged in stable  condition.    DIAGNOSIS  Final diagnoses:   COVID-19   Dyspnea, unspecified type   Thrombocytopenia (HCC)       FOLLOW UP   Tonya Marie, APRN  [address] [id number] [phone]    Schedule an appointment as soon as possible for a visit   as needed    [facility] Emergency Department  [address] [facility] [address] [phone]  Go to   as needed      RX     Medication List      New Prescriptions    benzocaine-menthol 6-10 MG lozenge  Commonly known as: CHLORASEPTIC  Take 1 lozenge by mouth Every 2 (Two) Hours As Needed for Sore Throat.     benzonatate 100 MG capsule  Commonly known as: TESSALON  Take 2 capsules by mouth 3 (Three) Times a Day As Needed for Cough.           Where to Get Your Medications      These medications were sent to [facility]  [address] [address]    Hours: 7:00 AM-6:00 PM Mon-Fri, 8:00 AM-4:30 PM Sat-Sun (Closed 12-12:30PM) Phone: [phone]   · benzocaine-menthol 6-10 MG lozenge  · benzonatate 100 MG capsule           MEDICATIONS GIVEN IN ED    Medications   sodium chloride 0.9 % flush 10 mL (has no administration in time range)   acetaminophen (TYLENOL) tablet 1,000 mg (1,000 mg Oral Given 1/20/22 0627)   sodium chloride 0.9 % bolus 1,000 mL (0 mL Intravenous Stopped 1/20/22 1144)           COURSE & MEDICAL DECISION MAKING  Any/All labs and Any/All Imaging studies that were ordered were reviewed and are noted above.  Results were reviewed/discussed with the patient and they were also made aware of online access.    Pt also made aware that some labs, such as cultures, will not be resulted during ER visit and followup with PMD is necessary.        Carin Iglesias, APRN  01/20/22 3419

## 2022-01-20 NOTE — ED NOTES
Pt ambulatory to triage from home with c/o short of air, chest pain with inspiration. Pt is texting her answers because she states her voice is gone.  Pt states is supposed to be on albuterol nebulizer, was out at her pharmacy. Pt wearing mask in triage. Triage personnel wore appropriate PPE         Hoda Gr, RN  01/20/22 5538

## 2022-01-20 NOTE — ED PROVIDER NOTES
MD ATTESTATION NOTE  I wore full protective equipment throughout this patient encounter including a N95 face mask, googles, gown and gloves. Hand hygiene was performed before donning protective equipment and after removal when leaving the room.    The MICHAEL and I have discussed this patient's history, physical exam, and treatment plan. I have reviewed the documentation and personally had a face to face interaction with the patient. I affirm the MICHAEL documentation and agree with their diagnostics, findings, treatment, plan, and disposition.  I provided a substantive portion of the care of this patient.  I personally performed the physical exam, in its entirety.  The attached note describes my personal findings.    Anayeli Burdick is a 26 y.o. female who presents to the ED c/o shortness of breath and nausea.  Patient reports that she has been feeling ill for the last several days.  Patient reports shortness of breath is constant, worse with exertion.  Patient denies any orthopnea or paroxysmal nocturnal dyspnea.  Patient does endorse cough, cough nonproductive in nature.  Patient endorses nausea but denies any emesis, no diarrhea, no abdominal pain.  Patient endorses chest pain which she describes as chest tightness, constant, worse with coughing, does not radiate, is not exertional.  Patient reports fever shakes and chills.  Patient had been vaccinated against COVID-19.    On exam:  General: No acute distress, well-appearing, speaking full sentences, maintaining oxygen saturation on room air without difficulty  HENT: NCAT, PERRL, Nares patent  Eyes: no scleral icterus  Neck: trachea midline, no ROM limitations  CV: regular rhythm, tachycardic rate  Respiratory: normal effort, CTAB, no wheezing rales or rhonchi  Abdomen: soft, nondistended, nontender to palpation, no rebound tenderness, no guarding or rigidity  : deferred  Musculoskeletal: no deformity  Neuro: alert, moves all extremities, follows commands  Skin:  warm, dry, no peripheral edema      LAB RESULTS  Recent Results (from the past 24 hour(s))   Respiratory Panel PCR w/COVID-19(SARS-CoV-2) [facility] In-House, NP Swab in UTM/VTM, 3-4 HR TAT - Swab, Nasopharynx    Collection Time: 01/20/22  5:18 AM    Specimen: Nasopharynx; Swab   Result Value Ref Range    ADENOVIRUS, PCR Not Detected Not Detected    Coronavirus 229E Not Detected Not Detected    Coronavirus HKU1 Not Detected Not Detected    Coronavirus NL63 Not Detected Not Detected    Coronavirus OC43 Not Detected Not Detected    COVID19 Detected (C) Not Detected - Ref. Range    Human Metapneumovirus Not Detected Not Detected    Human Rhinovirus/Enterovirus Not Detected Not Detected    Influenza A PCR Not Detected Not Detected    Influenza B PCR Not Detected Not Detected    Parainfluenza Virus 1 Not Detected Not Detected    Parainfluenza Virus 2 Not Detected Not Detected    Parainfluenza Virus 3 Not Detected Not Detected    Parainfluenza Virus 4 Not Detected Not Detected    RSV, PCR Not Detected Not Detected    Bordetella pertussis pcr Not Detected Not Detected    Bordetella parapertussis PCR Not Detected Not Detected    Chlamydophila pneumoniae PCR Not Detected Not Detected    Mycoplasma pneumo by PCR Not Detected Not Detected   CBC Auto Differential    Collection Time: 01/20/22  9:29 AM    Specimen: Blood   Result Value Ref Range    WBC 5.36 3.40 - 10.80 10*3/mm3    RBC 5.44 (H) 3.77 - 5.28 10*6/mm3    Hemoglobin 14.1 12.0 - 15.9 g/dL    Hematocrit 43.1 34.0 - 46.6 %    MCV 79.2 79.0 - 97.0 fL    MCH 25.9 (L) 26.6 - 33.0 pg    MCHC 32.7 31.5 - 35.7 g/dL    RDW 15.1 12.3 - 15.4 %    RDW-SD 42.2 37.0 - 54.0 fl    Platelets 82 (L) 140 - 450 10*3/mm3   hCG, Serum, Qualitative    Collection Time: 01/20/22  9:29 AM    Specimen: Blood   Result Value Ref Range    HCG Qualitative Negative Negative   Manual Differential    Collection Time: 01/20/22  9:29 AM    Specimen: Blood   Result Value Ref Range     Neutrophil % 82.3 (H) 42.7 - 76.0 %    Lymphocyte % 11.5 (L) 19.6 - 45.3 %    Monocyte % 5.2 5.0 - 12.0 %    Metamyelocyte % 1.0 (H) 0.0 - 0.0 %    Neutrophils Absolute 4.41 1.70 - 7.00 10*3/mm3    Lymphocytes Absolute 0.62 (L) 0.70 - 3.10 10*3/mm3    Monocytes Absolute 0.28 0.10 - 0.90 10*3/mm3    RBC Morphology Normal Normal    WBC Morphology Normal Normal    Platelet Morphology Normal Normal   ECG 12 Lead    Collection Time: 01/20/22  9:44 AM   Result Value Ref Range    QT Interval 335 ms   Troponin    Collection Time: 01/20/22 10:20 AM    Specimen: Blood   Result Value Ref Range    Troponin T <0.010 0.000 - 0.030 ng/mL       I ordered the above labs and reviewed the results.    RADIOLOGY  XR Chest 1 View    Result Date: 1/20/2022  Patient: CYNTHIA CORTES  Time Out: 05:41 Exam(s): FILM CXR 1 VIEW EXAM:   XR Chest, 1 View CLINICAL HISTORY:    Reason for exam: FUO. TECHNIQUE:   Frontal view of the chest. COMPARISON:   No relevant prior studies available. FINDINGS:   Lungs:  Slightly diminished lung volumes.  No definite focal consolidation.  The pulmonary vasculature is unremarkable.   Pleural space:  Unremarkable.  No pneumothorax. No large pleural effusion.   Heart:  Unremarkable.  No cardiomegaly.   Mediastinum:  No significant abnormality identified. The trachea is midline.   Bones joints:  Unremarkable. IMPRESSION:       No focal consolidation or acute cardiopulmonary process identified.     Electronically signed by Isiah Mena MD on 01-20-22 at 0541      I ordered the above noted radiological studies. I reviewed the images and results. I agree with the radiologist interpretation.    PROCEDURES  Procedures    MEDICATIONS GIVEN IN ER  Medications   sodium chloride 0.9 % flush 10 mL (has no administration in time range)   acetaminophen (TYLENOL) tablet 1,000 mg (1,000 mg Oral Given 1/20/22 0610)   sodium chloride 0.9 % bolus 1,000 mL (1,000 mL Intravenous New Bag 1/20/22 0963)       PROGRESS, DATA  ANALYSIS, CONSULTS, AND MEDICAL DECISION MAKING  A complete history and physical exam have been performed.  All available laboratory and imaging results have been reviewed by myself prior to disposition.  Face mask and gloves were worn throughout the patient encounter, unless additional PPE was worn and specified below. Hand hygiene was performed before entering and after leaving the patient room.  [facility]    ED Course as of 01/20/22 1143   Thu Jan 20, 2022   0830 Patient updated on her positive COVID-19 result done today.  We will give patient a liter of normal saline to help get her heart rate down, otherwise patient is not hypoxic, well-appearing and would be stable for discharge.  Patient is agreeable to the above plan. [MS]   0853 , O2 sats 100% on room air.  Will check basic labs on patient. She denies chest pain at this time. [MS]   0855 I viewed the patient's chest imaging in PACS.  My interpretation is no infiltrate or bony abnormality.  See dictation for official radiology interpretation.     [MS]   0855 Reviewed pt's history and workup with Dr. Hernandez.  After a bedside evaluation, they agree with the plan of care.       [MS]   0931 COVID19(!!): Detected [MS]   1038 BP: 122/55 [MS]   1039 Heart Rate: 113 [MS]   1039 SpO2: 96 % [MS]   1058 Temp: 99.7 °F (37.6 °C)  , resting comfortably. [MS]   1140 Patient is CoVID-19 positive but is well-appearing, satting fine on room air.  Patient has ambulated and maintained her sats without difficulty.  EKG showed no acute findings, troponin negative.  Incidental finding of thrombocytopenia but otherwise ED work-up is unremarkable.  Patient is well-appearing and appears appropriate for discharge with outpatient follow-up.  Given extensive discussion return precautions, discussed need to quarantine, discharging. [JG]   1141 The patient was reexamined.  They have had symptomatic improvement during their ED stay.  I discussed today's findings with the patient,  explaining the pertinent positives and negatives from today's visit, and the plan of care.  Discussed plan for discharge as there is no emergent indication for admission.  Discussed limitation of the ED work-up and that this is to rule out life-threatening emergencies but that they could require further testing as determined by their primary care and or any referred specialist patient is agreeable and understands need for follow-up and repeat exam/testing.  Patient is aware that discharge does not mean there is nothing wrong, indicates no emergency is present, and that they must continue their care with their primary care physician and/or any referred specialist.  They were given appropriate follow-up with their primary care physician and/or specialist.  I had an extensive discussion on the expected clinical course and return precautions.  Patient understands to return to the emergency department for continuation, worsening, or new symptoms.  I answered any of the patient's questions. Patient was discharged home in a stable condition.     [JG]      ED Course User Index  [JG] Heraclio Hernadnez MD  [MS] Carin Iglesias APRN       AS OF 11:43 EST VITALS:    BP - 118/64  HR - 113  TEMP - 99.7 °F (37.6 °C) (Oral)  O2 SATS - 96%    DIAGNOSIS  Final diagnoses:   COVID-19   Dyspnea, unspecified type   Thrombocytopenia (HCC)         DISPOSITION  DISCHARGE    Patient discharged in stable condition.    Reviewed implications of results, diagnosis, meds, responsibility to follow up, warning signs and symptoms of possible worsening, potential complications and reasons to return to ER.    Patient/Family voiced understanding of above instructions.    Discussed plan for discharge, as there is no emergent indication for admission. Patient referred to primary care provider for BP management due to today's BP. Pt/family is agreeable and understands need for follow up and repeat testing.  Pt is aware that discharge does not  mean that nothing is wrong but it indicates no emergency is present that requires admission and they must continue care with follow-up as given below or physician of their choice.     FOLLOW-UP  Tonya Marie, APRN  [address] [id number] [phone]    Schedule an appointment as soon as possible for a visit   as needed    [facility] Emergency Department  [address] [facility] [address] [phone]  Go to   as needed         Medication List      New Prescriptions    benzocaine-menthol 6-10 MG lozenge  Commonly known as: CHLORASEPTIC  Take 1 lozenge by mouth Every 2 (Two) Hours As Needed for Sore Throat.     benzonatate 200 MG capsule  Commonly known as: TESSALON  Take 1 capsule by mouth 3 (Three) Times a Day As Needed for Cough.           Where to Get Your Medications      These medications were sent to [facility]  [address] [address]    Hours: 7:00 AM-6:00 PM Mon-Fri, 8:00 AM-4:30 PM Sat-Sun (Closed 12-12:30PM) Phone: [phone]   · benzocaine-menthol 6-10 MG lozenge  · benzonatate 200 MG capsule            Heraclio Hernandez MD  01/20/22 1141

## 2022-01-28 ENCOUNTER — TELEPHONE (OUTPATIENT)
Dept: INTERNAL MEDICINE | Facility: CLINIC | Age: 27
End: 2022-01-28

## 2022-01-28 RX ORDER — BENZONATATE 100 MG/1
200 CAPSULE ORAL 3 TIMES DAILY PRN
Qty: 42 CAPSULE | Refills: 1 | Status: SHIPPED | OUTPATIENT
Start: 2022-01-28 | End: 2022-03-21 | Stop reason: ALTCHOICE

## 2022-01-28 NOTE — TELEPHONE ENCOUNTER
----- Message from Janis Cook MA sent at 1/28/2022  1:34 PM EST -----  Regarding: FW: Hello    ----- Message -----  From: Anayeli Burdick  Sent: 1/28/2022   1:33 PM EST  To: [facility]  Subject: Hello                                            Carlyselvin Montgomery I didnt inform you that I was positive for covid last thursday at ER. I feel much better but I have this uncontrol cough its driving me crazy. Can i get more tessalon pill or some different? I cannot sleep last night because of coughing.    Oh also I think I need a follow up because of Covid maybe.     Thanks

## 2022-02-10 RX ORDER — SEMAGLUTIDE 1.7 MG/.75ML
1.7 INJECTION, SOLUTION SUBCUTANEOUS WEEKLY
Qty: 3 ML | Refills: 0 | Status: SHIPPED | OUTPATIENT
Start: 2022-02-10 | End: 2022-03-09

## 2022-02-18 ENCOUNTER — OFFICE VISIT (OUTPATIENT)
Dept: INTERNAL MEDICINE | Facility: CLINIC | Age: 27
End: 2022-02-18

## 2022-02-18 ENCOUNTER — DOCUMENTATION (OUTPATIENT)
Dept: BARIATRICS/WEIGHT MGMT | Facility: CLINIC | Age: 27
End: 2022-02-18

## 2022-02-18 VITALS
WEIGHT: 239 LBS | TEMPERATURE: 98.8 F | SYSTOLIC BLOOD PRESSURE: 120 MMHG | BODY MASS INDEX: 40.8 KG/M2 | HEIGHT: 64 IN | HEART RATE: 111 BPM | OXYGEN SATURATION: 98 % | DIASTOLIC BLOOD PRESSURE: 82 MMHG

## 2022-02-18 DIAGNOSIS — R11.2 NAUSEA AND VOMITING, INTRACTABILITY OF VOMITING NOT SPECIFIED, UNSPECIFIED VOMITING TYPE: ICD-10-CM

## 2022-02-18 DIAGNOSIS — R05.3 PERSISTENT COUGH: Primary | ICD-10-CM

## 2022-02-18 PROCEDURE — 99213 OFFICE O/P EST LOW 20 MIN: CPT | Performed by: INTERNAL MEDICINE

## 2022-02-18 RX ORDER — METHYLPREDNISOLONE 4 MG/1
TABLET ORAL
Qty: 21 TABLET | Refills: 0 | Status: SHIPPED | OUTPATIENT
Start: 2022-02-18 | End: 2022-03-21 | Stop reason: ALTCHOICE

## 2022-02-18 RX ORDER — ONDANSETRON 8 MG/1
8 TABLET, ORALLY DISINTEGRATING ORAL EVERY 8 HOURS PRN
Qty: 20 TABLET | Refills: 0 | Status: SHIPPED | OUTPATIENT
Start: 2022-02-18

## 2022-02-18 RX ORDER — ONDANSETRON HYDROCHLORIDE 8 MG/1
8 TABLET, FILM COATED ORAL EVERY 8 HOURS PRN
Qty: 20 TABLET | Refills: 0 | Status: SHIPPED | OUTPATIENT
Start: 2022-02-18 | End: 2022-04-12 | Stop reason: HOSPADM

## 2022-02-18 NOTE — PROGRESS NOTES
Subjective     Anayeli Burdick is a 27 y.o. female who presents with   Chief Complaint   Patient presents with   • Cough   • Vomiting       History of Present Illness     Cough for one month.  Started when she got COVID.  Increased at time.  Wheezing is associated.  Inhalers and nebulizer no help.  No fever.  Some SOA with exertion.  CXR was negative.      Vomited today times three.  No Abdominal pain.  No diarrhea.      Review of Systems   Constitutional: Negative for fever.       The following portions of the patient's history were reviewed and updated as appropriate: allergies, current medications and problem list.    Patient Active Problem List    Diagnosis Date Noted   • Obesity, Class III, BMI 40-49.9 (morbid obesity) ([facility]) 12/03/2021   • Dietary counseling 12/03/2021   • Chronic back pain 10/01/2021   • ADHD 04/18/2018   • Depression 04/18/2018   • PTSD (post-traumatic stress disorder) 04/18/2018   • Asthma 08/28/2017       Current Outpatient Medications on File Prior to Visit   Medication Sig Dispense Refill   • albuterol (PROVENTIL) (2.5 MG/3ML) 0.083% nebulizer solution Take 1 vial by nebulization Every 4 (Four) Hours As Needed for Wheezing. 270 mL 12   • albuterol sulfate  (90 Base) MCG/ACT inhaler Inhale 2 puffs Every 4 (Four) Hours As Needed for Wheezing. 8 g 0   • benzocaine-menthol (CHLORASEPTIC) 6-10 MG lozenge Take 1 lozenge by mouth Every 2 (Two) Hours As Needed for Sore Throat. 18 lozenge 0   • benzonatate (TESSALON) 100 MG capsule Take 2 capsules by mouth 3 (Three) Times a Day As Needed for Cough. 42 capsule 1   • montelukast (SINGULAIR) 10 MG tablet Take 1 tablet by mouth Every Night. 90 tablet 3   • mupirocin (BACTROBAN) 2 % ointment apply a small amount to the affected area by topical route 3 times per day for 7 days 22 g 0   • Semaglutide-Weight Management (Wegovy) 1.7 MG/0.75ML solution auto-injector Inject 0.75 mL under the skin into the appropriate area as directed 1 (One) Time  "Per Week. 4 pens 3 mL 0   • Spiriva Respimat 2.5 MCG/ACT aerosol solution inhaler Inhale 2 puffs Daily. 4 g 12   • SUMAtriptan (IMITREX) 50 MG tablet Take 50 mg by mouth.     • [DISCONTINUED] buPROPion (WELLBUTRIN) 75 MG tablet Take 1 tablet by mouth 2 (Two) Times a Day. 60 tablet 3     No current facility-administered medications on file prior to visit.       Objective     /82   Pulse 111   Temp 98.8 °F (37.1 °C)   Ht 162.6 cm (64.02\")   Wt 108 kg (239 lb)   SpO2 98%   BMI 41.00 kg/m²     Physical Exam  Constitutional:       Appearance: She is well-developed.   HENT:      Head: Normocephalic and atraumatic.   Cardiovascular:      Rate and Rhythm: Normal rate and regular rhythm.      Heart sounds: Normal heart sounds.   Pulmonary:      Effort: Pulmonary effort is normal.      Breath sounds: Normal breath sounds.   Abdominal:      General: There is no distension.      Palpations: Abdomen is soft. There is no mass.      Tenderness: There is no abdominal tenderness. There is no guarding or rebound.   Skin:     General: Skin is warm and dry.   Neurological:      Mental Status: She is alert and oriented to person, place, and time.   Psychiatric:         Behavior: Behavior normal.         Assessment/Plan   Diagnoses and all orders for this visit:    1. Persistent cough (Primary)    2. Nausea and vomiting, intractability of vomiting not specified, unspecified vomiting type  -     CBC & Differential  -     Comprehensive Metabolic Panel    Other orders  -     ondansetron (Zofran) 8 MG tablet; Take 1 tablet by mouth Every 8 (Eight) Hours As Needed for Nausea or Vomiting.  Dispense: 20 tablet; Refill: 0  -     methylPREDNISolone (MEDROL) 4 MG dose pack; Take as directed on package instructions.  Dispense: 21 tablet; Refill: 0        Discussion    Patient presents with a persistent post COVID cough.  CXR was negative.   She has asthma.  Trial of a steroid hannah.  The patient is instructed to let our office know if not " feeling better over the next several days or if there is any change in symptoms.    Vomiting today.  Possibly gastroenteritis.    Discussed attention to fluid intake.  Add prn Zofran.  The patient is instructed to follow a BRAT diet.  Check basic labs today.  Let me know if not feeling better over the next 3-5 days or if there is any change in symptoms.            Future Appointments   Date Time Provider Department Center   4/7/2022  2:45 PM Zeferino Hill MD [facility]

## 2022-02-19 LAB
ALBUMIN SERPL-MCNC: 4.4 G/DL (ref 3.9–5)
ALBUMIN/GLOB SERPL: 1.3 {RATIO} (ref 1.2–2.2)
ALP SERPL-CCNC: 54 IU/L (ref 44–121)
ALT SERPL-CCNC: 50 IU/L (ref 0–32)
AST SERPL-CCNC: 36 IU/L (ref 0–40)
BASOPHILS # BLD AUTO: 0 X10E3/UL (ref 0–0.2)
BASOPHILS NFR BLD AUTO: 0 %
BILIRUB SERPL-MCNC: 0.6 MG/DL (ref 0–1.2)
BUN SERPL-MCNC: 10 MG/DL (ref 6–20)
BUN/CREAT SERPL: 15 (ref 9–23)
CALCIUM SERPL-MCNC: 9.4 MG/DL (ref 8.7–10.2)
CHLORIDE SERPL-SCNC: 104 MMOL/L (ref 96–106)
CO2 SERPL-SCNC: 21 MMOL/L (ref 20–29)
CREAT SERPL-MCNC: 0.68 MG/DL (ref 0.57–1)
EOSINOPHIL # BLD AUTO: 0.1 X10E3/UL (ref 0–0.4)
EOSINOPHIL NFR BLD AUTO: 1 %
ERYTHROCYTE [DISTWIDTH] IN BLOOD BY AUTOMATED COUNT: 14.8 % (ref 11.7–15.4)
GLOBULIN SER CALC-MCNC: 3.4 G/DL (ref 1.5–4.5)
GLUCOSE SERPL-MCNC: 98 MG/DL (ref 65–99)
HCT VFR BLD AUTO: 44.2 % (ref 34–46.6)
HGB BLD-MCNC: 14.4 G/DL (ref 11.1–15.9)
IMM GRANULOCYTES # BLD AUTO: 0 X10E3/UL (ref 0–0.1)
IMM GRANULOCYTES NFR BLD AUTO: 0 %
LYMPHOCYTES # BLD AUTO: 0.5 X10E3/UL (ref 0.7–3.1)
LYMPHOCYTES NFR BLD AUTO: 6 %
MCH RBC QN AUTO: 26.2 PG (ref 26.6–33)
MCHC RBC AUTO-ENTMCNC: 32.6 G/DL (ref 31.5–35.7)
MCV RBC AUTO: 81 FL (ref 79–97)
MONOCYTES # BLD AUTO: 0.7 X10E3/UL (ref 0.1–0.9)
MONOCYTES NFR BLD AUTO: 8 %
NEUTROPHILS # BLD AUTO: 7.6 X10E3/UL (ref 1.4–7)
NEUTROPHILS NFR BLD AUTO: 85 %
PLATELET # BLD AUTO: 255 X10E3/UL (ref 150–450)
POTASSIUM SERPL-SCNC: 4.1 MMOL/L (ref 3.5–5.2)
PROT SERPL-MCNC: 7.8 G/DL (ref 6–8.5)
RBC # BLD AUTO: 5.49 X10E6/UL (ref 3.77–5.28)
SODIUM SERPL-SCNC: 141 MMOL/L (ref 134–144)
WBC # BLD AUTO: 8.9 X10E3/UL (ref 3.4–10.8)

## 2022-03-09 RX ORDER — SEMAGLUTIDE 2.4 MG/.75ML
0.75 INJECTION, SOLUTION SUBCUTANEOUS WEEKLY
Qty: 3 ML | Refills: 1 | Status: SHIPPED | OUTPATIENT
Start: 2022-03-09 | End: 2022-05-05 | Stop reason: SDUPTHER

## 2022-03-21 ENCOUNTER — OFFICE VISIT (OUTPATIENT)
Dept: BARIATRICS/WEIGHT MGMT | Facility: CLINIC | Age: 27
End: 2022-03-21

## 2022-03-21 VITALS
WEIGHT: 230 LBS | SYSTOLIC BLOOD PRESSURE: 120 MMHG | RESPIRATION RATE: 18 BRPM | TEMPERATURE: 97.8 F | HEIGHT: 64 IN | DIASTOLIC BLOOD PRESSURE: 79 MMHG | BODY MASS INDEX: 39.27 KG/M2 | HEART RATE: 84 BPM

## 2022-03-21 DIAGNOSIS — M54.50 CHRONIC BILATERAL LOW BACK PAIN, UNSPECIFIED WHETHER SCIATICA PRESENT: ICD-10-CM

## 2022-03-21 DIAGNOSIS — J45.901 ASTHMA WITH ACUTE EXACERBATION, UNSPECIFIED ASTHMA SEVERITY, UNSPECIFIED WHETHER PERSISTENT: ICD-10-CM

## 2022-03-21 DIAGNOSIS — F43.10 PTSD (POST-TRAUMATIC STRESS DISORDER): ICD-10-CM

## 2022-03-21 DIAGNOSIS — G89.29 CHRONIC BILATERAL LOW BACK PAIN, UNSPECIFIED WHETHER SCIATICA PRESENT: ICD-10-CM

## 2022-03-21 DIAGNOSIS — E66.9 OBESITY, CLASS II, BMI 35-39.9: Primary | ICD-10-CM

## 2022-03-21 DIAGNOSIS — Z71.3 DIETARY COUNSELING: ICD-10-CM

## 2022-03-21 PROBLEM — E66.01 OBESITY, CLASS III, BMI 40-49.9 (MORBID OBESITY): Status: RESOLVED | Noted: 2021-12-03 | Resolved: 2022-03-21

## 2022-03-21 PROBLEM — E66.812 OBESITY, CLASS II, BMI 35-39.9: Status: ACTIVE | Noted: 2022-03-21

## 2022-03-21 PROBLEM — E66.813 OBESITY, CLASS III, BMI 40-49.9 (MORBID OBESITY): Status: RESOLVED | Noted: 2021-12-03 | Resolved: 2022-03-21

## 2022-03-21 PROCEDURE — 99213 OFFICE O/P EST LOW 20 MIN: CPT | Performed by: NURSE PRACTITIONER

## 2022-03-21 NOTE — PROGRESS NOTES
[facility]  [address] [address]  [phone]  [address] [id number] [phone]  Dept: [phone]  3/21/2022      Anayeli Burdick.  [id number]  [id number]  1995  female      Chief Complaint   Patient presents with   • Follow-up     RX FOLLOW UP           HPI:   Today's weight is 104 kg (230 lb) pounds, today's BMI is Body mass index is 39.46 kg/m².,has a  loss of 30 pounds since the last visit. The patient reports a decreased portion size and loss of appetite.      Anayeli Burdick denies nausea, vomiting, dysphagia and reports some occasional reflux not controlled with Pepcid.  Is feeling good on the Wegovy.  She is tolerating the full dose without complication . Occassional reflux is her only side effect.  She is working hard to make sure she eats her protein first.  She was utilizing protein shakes but got off this pattern during her move with a prolonged hospital stay.  She is resuming these as well as the cottage cheese she was eating as a snack.  She states she has early satiety.  She has increased her activity and has noticed that with weight loss she feels better and is able to be more active around the house.  She is not drinking sodas and limiting snacking.      Review of Systems   Constitutional: Positive for activity change and appetite change.   Respiratory: Negative for chest tightness and shortness of breath.    Cardiovascular: Negative for chest pain and palpitations.   Gastrointestinal: Negative for nausea and vomiting.        Reflux   Musculoskeletal: Positive for arthralgias and back pain.   All other systems reviewed and are negative.      Patient Active Problem List   Diagnosis   • ADHD   • Asthma   • Depression   • PTSD (post-traumatic stress disorder)   • Chronic back pain   • Dietary counseling   • Obesity, Class II, BMI 35-39.9       Past Medical History:   Diagnosis  Date   • ADHD 1995   • Anxiety 2013   • Asthma 2004   • Back pain 2020   • Depression 2017   • Headache 2013   • Visual impairment 2003       The following portions of the patient's history were reviewed and updated as appropriate: allergies, current medications, past medical history, past surgical history and problem list.    Vitals:    03/21/22 1609   BP: 120/79   Pulse: 84   Resp: 18   Temp: 97.8 °F (36.6 °C)       Physical Exam  Vitals reviewed.   Constitutional:       General: She is not in acute distress.     Appearance: Normal appearance. She is obese.   HENT:      Head: Normocephalic and atraumatic.      Right Ear: External ear normal.      Left Ear: External ear normal.      Nose: Nose normal.      Mouth/Throat:      Mouth: Mucous membranes are moist.      Pharynx: Oropharynx is clear.   Eyes:      General: No scleral icterus.     Extraocular Movements: Extraocular movements intact.      Conjunctiva/sclera: Conjunctivae normal.      Pupils: Pupils are equal, round, and reactive to light.   Cardiovascular:      Rate and Rhythm: Normal rate and regular rhythm.      Heart sounds: Normal heart sounds. No murmur heard.    No gallop.   Pulmonary:      Effort: Pulmonary effort is normal. No respiratory distress.   Abdominal:      General: Bowel sounds are normal.      Palpations: Abdomen is soft.   Musculoskeletal:         General: Normal range of motion.      Cervical back: Normal range of motion and neck supple.   Skin:     General: Skin is warm and dry.   Neurological:      General: No focal deficit present.      Mental Status: She is alert and oriented to person, place, and time.   Psychiatric:         Mood and Affect: Mood normal.         Behavior: Behavior normal.         Thought Content: Thought content normal.         Judgment: Judgment normal.         Assessment:   Anayeli Burdick is doing well with Wegovy.  She has lost 30% of her extra weight.  She is tolerating the full dose of Wegovy without side  effects.        Encounter Diagnoses   Name Primary?   • Obesity, Class II, BMI 35-39.9 Yes   • PTSD (post-traumatic stress disorder)    • Chronic bilateral low back pain, unspecified whether sciatica present    • Asthma with acute exacerbation, unspecified asthma severity, unspecified whether persistent    • Dietary counseling        Plan:   Will continue Wegovy.    Encouraged patient to be sure to get plenty of lean protein per day through small frequent meals all with a protein source.   Activity restrictions: none.   Recommended patient be sure to get at least 70 grams of protein per day by eating small, frequent meals all with high lean protein choices. Be sure to limit/cut back on daily carbohydrate intake. Discussed with the patient the recommended amount of water per day to intake- 64 oz. Reviewed vitamin requirements. Be sure to do routine exercise as tolerated, 150 minutes per week minimum, including both cardio and strength training.     The patient was instructed to follow up in 3-4 months.     Total time spent during this encounter today was 20 minutes.
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
## 2022-04-12 ENCOUNTER — OFFICE VISIT (OUTPATIENT)
Dept: NEUROSURGERY | Facility: CLINIC | Age: 27
End: 2022-04-12

## 2022-04-12 VITALS
SYSTOLIC BLOOD PRESSURE: 120 MMHG | DIASTOLIC BLOOD PRESSURE: 84 MMHG | HEART RATE: 98 BPM | BODY MASS INDEX: 39.87 KG/M2 | WEIGHT: 225 LBS | HEIGHT: 63 IN

## 2022-04-12 DIAGNOSIS — G89.29 CHRONIC BILATERAL LOW BACK PAIN WITH BILATERAL SCIATICA: Primary | ICD-10-CM

## 2022-04-12 DIAGNOSIS — M54.42 CHRONIC BILATERAL LOW BACK PAIN WITH BILATERAL SCIATICA: Primary | ICD-10-CM

## 2022-04-12 DIAGNOSIS — M54.2 NECK PAIN: ICD-10-CM

## 2022-04-12 DIAGNOSIS — M54.6 PAIN IN THORACIC SPINE: ICD-10-CM

## 2022-04-12 DIAGNOSIS — M54.41 CHRONIC BILATERAL LOW BACK PAIN WITH BILATERAL SCIATICA: Primary | ICD-10-CM

## 2022-04-12 PROCEDURE — 99204 OFFICE O/P NEW MOD 45 MIN: CPT | Performed by: NEUROLOGICAL SURGERY

## 2022-04-12 RX ORDER — DEXAMETHASONE 4 MG/1
4 TABLET ORAL TAKE AS DIRECTED
Qty: 5 TABLET | Refills: 0 | Status: SHIPPED | OUTPATIENT
Start: 2022-04-12 | End: 2022-04-21 | Stop reason: HOSPADM

## 2022-04-12 RX ORDER — DIPHENHYDRAMINE HCL 25 MG
TABLET ORAL
Qty: 2 TABLET | Refills: 0 | Status: SHIPPED | OUTPATIENT
Start: 2022-04-12 | End: 2022-04-21 | Stop reason: HOSPADM

## 2022-04-14 NOTE — PROGRESS NOTES
04/21/22 0002   Pre-Procedure Phone Call   Procedure Time Verified Yes   Arrival Time 0630   Procedure Location Verified Yes   Medical History Reviewed No   NPO Status Reinforced Yes   Ride and Caregiver Arranged Yes   Patient Knows to Bring Current Medications   (No changes in medications since last reviewed. Patient with iodine allergy Decadron and Benadryl RX'd.)   Bring Outside Films Requested   (Dr Hill  patient)

## 2022-04-15 ENCOUNTER — PATIENT ROUNDING (BHMG ONLY) (OUTPATIENT)
Dept: NEUROSURGERY | Facility: CLINIC | Age: 27
End: 2022-04-15

## 2022-04-15 NOTE — PROGRESS NOTES
Unfortunately my experience wasn't very good, a lot of discommunucation with the staff. I call multiple before my appointment to have everything on hand for the doctor and they never tell me to bring the disc to the appointment. And I even call the day before to confirm my appointment, arrive time & the verify again my documents with the staff. I was always told everything is correct, we will see you on our appointment. But the day of my appointment i get the terrible new of rescheduling me because i didnt bring the disc of my Imaging. Thank god i got reschedule for the following week because if not , i was going to loss it.     But i wasn't Satisfied with the less than 5 min with the doctor tariq. I Thought he was going to ask about my pain and talk to me more. But he just saw me and order more test. That I dont mind doing, maybe he want to do his own testing to maybe sit down with me explain everything in more details.      I had a good experience with the schedule lady that schedule my test. But the scheduling department confused me ilya bad because they wanted the schedule me for test again, when it was already schedule.      I hope on my next appointment my mind set about the practice change to the better because i already need a lot of help and good results.

## 2022-04-21 ENCOUNTER — HOSPITAL ENCOUNTER (OUTPATIENT)
Dept: GENERAL RADIOLOGY | Facility: HOSPITAL | Age: 27
Discharge: HOME | End: 2022-04-21

## 2022-04-21 ENCOUNTER — HOSPITAL ENCOUNTER (OUTPATIENT)
Dept: CT IMAGING | Facility: HOSPITAL | Age: 27
Discharge: HOME | End: 2022-04-21

## 2022-04-21 VITALS
TEMPERATURE: 98.6 F | DIASTOLIC BLOOD PRESSURE: 69 MMHG | HEIGHT: 63 IN | SYSTOLIC BLOOD PRESSURE: 108 MMHG | RESPIRATION RATE: 18 BRPM | HEART RATE: 106 BPM | WEIGHT: 225 LBS | BODY MASS INDEX: 39.87 KG/M2 | OXYGEN SATURATION: 99 %

## 2022-04-21 DIAGNOSIS — M54.2 NECK PAIN: ICD-10-CM

## 2022-04-21 DIAGNOSIS — M54.42 CHRONIC BILATERAL LOW BACK PAIN WITH BILATERAL SCIATICA: ICD-10-CM

## 2022-04-21 DIAGNOSIS — M54.41 CHRONIC BILATERAL LOW BACK PAIN WITH BILATERAL SCIATICA: ICD-10-CM

## 2022-04-21 DIAGNOSIS — G89.29 CHRONIC BILATERAL LOW BACK PAIN WITH BILATERAL SCIATICA: ICD-10-CM

## 2022-04-21 DIAGNOSIS — M54.6 PAIN IN THORACIC SPINE: ICD-10-CM

## 2022-04-21 PROCEDURE — 62284 INJECTION FOR MYELOGRAM: CPT | Performed by: NEUROLOGICAL SURGERY

## 2022-04-21 PROCEDURE — 25010000002 IOPAMIDOL 61 % SOLUTION: Performed by: NEUROLOGICAL SURGERY

## 2022-04-21 PROCEDURE — 72129 CT CHEST SPINE W/DYE: CPT

## 2022-04-21 PROCEDURE — 72240 MYELOGRAPHY NECK SPINE: CPT

## 2022-04-21 PROCEDURE — 62305 MYELOGRAPHY LUMBAR INJECTION: CPT

## 2022-04-21 PROCEDURE — 72114 X-RAY EXAM L-S SPINE BENDING: CPT

## 2022-04-21 PROCEDURE — 72126 CT NECK SPINE W/DYE: CPT

## 2022-04-21 PROCEDURE — 0 LIDOCAINE 1 % SOLUTION: Performed by: NEUROLOGICAL SURGERY

## 2022-04-21 PROCEDURE — 72132 CT LUMBAR SPINE W/DYE: CPT

## 2022-04-21 PROCEDURE — 63710000001 ONDANSETRON PER 8 MG: Performed by: RADIOLOGY

## 2022-04-21 PROCEDURE — 72052 X-RAY EXAM NECK SPINE 6/>VWS: CPT

## 2022-04-21 RX ORDER — ONDANSETRON 4 MG/1
4 TABLET, FILM COATED ORAL ONCE
Status: COMPLETED | OUTPATIENT
Start: 2022-04-21 | End: 2022-04-21

## 2022-04-21 RX ORDER — LIDOCAINE HYDROCHLORIDE 10 MG/ML
10 INJECTION, SOLUTION INFILTRATION; PERINEURAL ONCE
Status: COMPLETED | OUTPATIENT
Start: 2022-04-21 | End: 2022-04-21

## 2022-04-21 RX ADMIN — IOPAMIDOL 15 ML: 612 INJECTION, SOLUTION INTRATHECAL at 07:20

## 2022-04-21 RX ADMIN — LIDOCAINE HYDROCHLORIDE 10 ML: 10 INJECTION, SOLUTION INFILTRATION; PERINEURAL at 07:20

## 2022-04-21 RX ADMIN — ONDANSETRON 4 MG: 4 TABLET, FILM COATED ORAL at 09:15

## 2022-04-21 NOTE — NURSING NOTE
Pt arrived to Radiology triage [facility] for Cervical, Thoracic and Lumbar Myelogram.   Pt wearing a mask as well as this RN for any bedside care.

## 2022-04-21 NOTE — DISCHARGE INSTRUCTIONS
EDUCATION /DISCHARGE INSTRUCTIONS:    A myelogram is a special radiology procedure of the spinal cord, spinal nerves and other related structures.  You will be awake during the examination.  An area of your lower back will be cleansed with an antiseptic solution.  The physician will inject a numbing medication in your lower back.  While your back is numb, a needle will be placed in the lower back area.  A small amount of spinal fluid may be withdrawn and sent to the lab if ordered by your physician. While the needle is in the back, an injection of a contrast material (xray dye) will be given through the needle.  The contrast material will allow the physician to see the spinal cord and spinal nerves.  Once injected, the needle will be removed and a band aid will be placed over the injection site.  The table will be tilted during the process to allow the contrast material to flow to particular areas in the spine.  Following the injection and xrays, you will be taken to the CT scanner where more pictures will be taken. After the procedure is finished, the contrast material will be absorbed by your body and eliminated through your kidneys.  The radiologist will study and interpret your myelogram and send the results to your physician.  Procedure risks of a myelogram include, but are not limited to:  *  Bleeding   *  Seizure  *  Infection   *  Headache, possibly severe requiring a blood patch  *  Contrast reaction  *  Nerve or cord injury  *  Paralysis and death    Benefits of the procedure:  *  Best examination for delineating pathology related to spinal cord compression from a disc and/or nerve root compression  Alternatives to the procedure:  MRI - a non invasive procedure requiring intravenous contrast injection.  Cannot be done on patients with certain pacemakers or metal in the body.  MRI risks include possible reaction to the contrast material, movement of metal located in the body.Benefit to MRI:  Non-invasive  and usually painless procedure.  THIS EDUCATION INFORMATION WAS REVIEWED PRIOR TO PROCEDURE AND CONSENT. Patient initials___SMR___Time____0645__    24 hour rest period ends tomorrow, April 22nd after 1000 am.    Important information following your myelogram:  * ACTIVITY:   *  You may sit up in the car to go home.  *  When you get home, remain on bed rest (flat on your back or on your side) for 24 hours. You may place a rolled up towel under your neck for support  * You may get up to the bathroom and sit up to eat and drink then lie back down  * Drink additional fluids for 24 hours after the myelogram.   * Continue to drink additional fluids for the next 2-3 days. Water and caffeinated beverages are encouraged.  * Remain less active for the next two to three days.  * Do not drive for 24 hours following a myelogram.  * You may remove the bandage and shower in the morning.    CALL YOUR PHYSICIAN FOR THE FOLLOWING:  * Pain at the injection site  * Redness, swelling, bruising or drainage at the injection site.  * A fever by mouth of 101.0 or any new symptoms  Headaches are a common side effect after a myelogram.  If you get a headache, you should stay flat in bed and drink plenty of fluids. If the headache persists and does not go away with rest/medication, CALL Dr. Hill at [phone]

## 2022-04-21 NOTE — NURSING NOTE
Pt dressed then assisted to private vehicle via wheelchair by this RN to leave with her spouse. NAD noted.

## 2022-04-22 ENCOUNTER — TELEPHONE (OUTPATIENT)
Dept: INTERVENTIONAL RADIOLOGY/VASCULAR | Facility: HOSPITAL | Age: 27
End: 2022-04-22

## 2022-04-25 ENCOUNTER — HOSPITAL ENCOUNTER (OUTPATIENT)
Dept: GENERAL RADIOLOGY | Facility: HOSPITAL | Age: 27
Discharge: HOME | End: 2022-04-25

## 2022-04-25 ENCOUNTER — ANESTHESIA EVENT (OUTPATIENT)
Dept: PAIN MEDICINE | Facility: HOSPITAL | Age: 27
End: 2022-04-25

## 2022-04-25 ENCOUNTER — ANESTHESIA (OUTPATIENT)
Dept: PAIN MEDICINE | Facility: HOSPITAL | Age: 27
End: 2022-04-25

## 2022-04-25 ENCOUNTER — HOSPITAL ENCOUNTER (OUTPATIENT)
Dept: PAIN MEDICINE | Facility: HOSPITAL | Age: 27
Discharge: HOME | End: 2022-04-25

## 2022-04-25 ENCOUNTER — TELEPHONE (OUTPATIENT)
Dept: NEUROSURGERY | Facility: CLINIC | Age: 27
End: 2022-04-25

## 2022-04-25 VITALS
OXYGEN SATURATION: 98 % | RESPIRATION RATE: 16 BRPM | HEART RATE: 78 BPM | SYSTOLIC BLOOD PRESSURE: 112 MMHG | TEMPERATURE: 97.7 F | DIASTOLIC BLOOD PRESSURE: 72 MMHG

## 2022-04-25 DIAGNOSIS — R52 PAIN: ICD-10-CM

## 2022-04-25 PROCEDURE — 25010000002 MIDAZOLAM PER 1 MG: Performed by: ANESTHESIOLOGY

## 2022-04-25 PROCEDURE — 77003 FLUOROGUIDE FOR SPINE INJECT: CPT

## 2022-04-25 RX ORDER — SODIUM CHLORIDE 0.9 % (FLUSH) 0.9 %
10 SYRINGE (ML) INJECTION AS NEEDED
Status: DISCONTINUED | OUTPATIENT
Start: 2022-04-25 | End: 2022-04-26 | Stop reason: HOSPADM

## 2022-04-25 RX ORDER — SODIUM CHLORIDE 0.9 % (FLUSH) 0.9 %
10 SYRINGE (ML) INJECTION EVERY 12 HOURS SCHEDULED
Status: DISCONTINUED | OUTPATIENT
Start: 2022-04-25 | End: 2022-04-26 | Stop reason: HOSPADM

## 2022-04-25 RX ORDER — LIDOCAINE HYDROCHLORIDE 10 MG/ML
1 INJECTION, SOLUTION INFILTRATION; PERINEURAL ONCE
Status: DISCONTINUED | OUTPATIENT
Start: 2022-04-25 | End: 2022-04-26 | Stop reason: HOSPADM

## 2022-04-25 RX ORDER — FENTANYL CITRATE 50 UG/ML
50 INJECTION, SOLUTION INTRAMUSCULAR; INTRAVENOUS AS NEEDED
Status: DISCONTINUED | OUTPATIENT
Start: 2022-04-25 | End: 2022-04-26 | Stop reason: HOSPADM

## 2022-04-25 RX ORDER — MIDAZOLAM HYDROCHLORIDE 1 MG/ML
1 INJECTION INTRAMUSCULAR; INTRAVENOUS AS NEEDED
Status: DISCONTINUED | OUTPATIENT
Start: 2022-04-25 | End: 2022-04-26 | Stop reason: HOSPADM

## 2022-04-25 RX ADMIN — MIDAZOLAM 2 MG: 1 INJECTION INTRAMUSCULAR; INTRAVENOUS at 13:38

## 2022-04-25 NOTE — TELEPHONE ENCOUNTER
Pt called stating that her headache has continued to get worse, also complains of dizziness and nausea. Pt had myelogram on 4/21. Please call and advise

## 2022-04-25 NOTE — H&P
[facility]    History and Physical    Patient Name: Anayeli Burdick  :  1995  MRN:  [id number]  Date of Admission: 2022    Subjective     Patient is a 27 y.o. female presents with chief complaint of acute, moderate headache pain.  Onset of symptoms was abrupt starting 2 days ago.  Symptoms are associated/aggravated by standing. Symptoms improve with lying down    The following portions of the patients history were reviewed and updated as appropriate: current medications, allergies, past medical history, past surgical history, past family history, past social history and problem list                Objective     Past Medical History:   Past Medical History:   Diagnosis Date   • ADHD    • Anxiety    • Asthma    • Back pain    • Depression    • Headache    • Visual impairment      Past Surgical History:   Past Surgical History:   Procedure Laterality Date   • FOOT SURGERY Left 2009    left foot had non cancer mass   • FOOT SURGERY Left 2010    left foot had non cancer mass again   • NOSE SURGERY Bilateral 2021   • SINUS SURGERY     • TONSILLECTOMY AND ADENOIDECTOMY Bilateral 2020   • WISDOM TOOTH EXTRACTION Bilateral      Family History:   Family History   Problem Relation Age of Onset   • Multiple sclerosis Mother    • Thyroid disease Mother    • Calcium disorder Mother    • Cataracts Mother    • Cancer Mother    • Thyroid nodules Mother    • Stroke Mother    • Osteoporotic fracture Mother    • Heart attack Father    • Heart disease Father    • Parkinsonism Father    • Abnormal EKG Father    • Allergies Father    • Post-traumatic stress disorder Father    • Stroke Father    • Thyroid disease Brother    • Calcium disorder Brother    • Vision loss Brother    • Kidney disease Maternal Grandmother    • Kidney failure Maternal Grandmother    • Cancer Maternal Grandfather    • Throat cancer Maternal Grandfather    • Stroke Paternal Grandmother    • Heart  disease Paternal Grandmother    • Cancer Paternal Grandfather      Social History:   Social History     Socioeconomic History   • Marital status:      Spouse name: Hector Burdick   • Number of children: 0   • Highest education level: Bachelor's degree (e.g., BA, AB, BS)   Tobacco Use   • Smoking status: Never Smoker   • Smokeless tobacco: Never Used   Vaping Use   • Vaping Use: Never used   Substance and Sexual Activity   • Alcohol use: Not Currently     Alcohol/week: 0.0 standard drinks   • Drug use: Never   • Sexual activity: Yes     Partners: Male     Birth control/protection: Implant     Comment: Neplaxon       Vital Signs Range for the last 24 hours  Temperature: Temp:  [36.5 °C (97.7 °F)] 36.5 °C (97.7 °F)   Temp Source: Temp src: Infrared   BP: BP: (120)/(73) 120/73   Pulse: Heart Rate:  [88] 88   Respirations: Resp:  [16] 16   SPO2: SpO2:  [99 %] 99 %   O2 Amount (l/min):     O2 Devices Device (Oxygen Therapy): room air   Weight:           --------------------------------------------------------------------------------    Current Outpatient Medications   Medication Sig Dispense Refill   • albuterol (PROVENTIL) (2.5 MG/3ML) 0.083% nebulizer solution Take 1 vial by nebulization Every 4 (Four) Hours As Needed for Wheezing. 270 mL 12   • albuterol sulfate  (90 Base) MCG/ACT inhaler Inhale 2 puffs Every 4 (Four) Hours As Needed for Wheezing. 8 g 0   • montelukast (SINGULAIR) 10 MG tablet Take 1 tablet by mouth Every Night. 90 tablet 3   • ondansetron ODT (Zofran ODT) 8 MG disintegrating tablet Place 1 tablet on the tongue Every 8 (Eight) Hours As Needed for Nausea or Vomiting. Indications: Nausea and Vomiting 20 tablet 0   • Semaglutide-Weight Management (Wegovy) 2.4 MG/0.75ML solution auto-injector Inject 0.75 mL under the skin into the appropriate area as directed 1 (One) Time Per Week. 3 mL 1   • Spiriva Respimat 2.5 MCG/ACT aerosol solution inhaler Inhale 2 puffs Daily. 4 g 12   • SUMAtriptan  (IMITREX) 50 MG tablet Take 50 mg by mouth.       Current Facility-Administered Medications   Medication Dose Route Frequency Provider Last Rate Last Admin   • fentaNYL citrate (PF) (SUBLIMAZE) injection 50 mcg  50 mcg Intravenous PRN Harmeet Massey MD       • lidocaine (XYLOCAINE) 1 % injection 1 mL  1 mL Intradermal Once Harmeet Massey MD       • midazolam (VERSED) injection 1 mg  1 mg Intravenous PRN Harmeet Massey MD       • sodium chloride 0.9 % flush 10 mL  10 mL Intravenous Q12H Harmeet Massey MD       • sodium chloride 0.9 % flush 10 mL  10 mL Intravenous PRN Harmeet Massey MD           --------------------------------------------------------------------------------  Assessment/Plan      Anesthesia Evaluation     Patient summary reviewed and Nursing notes reviewed   NPO Solid Status: > 8 hours  NPO Liquid Status: > 2 hours    Pain impairs ability to perform ADLs: Sleeping  Modalities previously tried to control pain with limited effectiveness within the last 4-6 weeks: Rest     Airway   Mallampati: II  TM distance: >3 FB  Neck ROM: full  Dental - normal exam     Pulmonary - normal exam   (+) asthma,  Cardiovascular - negative cardio ROS and normal exam        Neuro/Psych- neuro exam normal  (+) headaches, psychiatric history Anxiety and Depression,    GI/Hepatic/Renal/Endo - negative ROS     Musculoskeletal (-) normal exam    (+) back pain, neck pain, radiculopathy  Abdominal  - normal exam   Substance History - negative use     OB/GYN negative ob/gyn ROS         Other                   Diagnosis and Plan    Treatment Plan  ASA 2      Procedures: Epidural blood patch, With fluoroscopy,       Anesthetic plan and risks discussed with patient.          Diagnosis     * Post-dural puncture headache [G97.1]

## 2022-04-25 NOTE — TELEPHONE ENCOUNTER
PATIENT CALLED W/MYELOGRAM SYMPTOMS OF HEADACHE NAUSEA, DIZZINESS, AND UNABLE TO GET OUT OF BED, WARM TRANSFERRED CATHERINE TOOK CALL

## 2022-04-25 NOTE — H&P (VIEW-ONLY)
[facility]    History and Physical    Patient Name: Anayeli Burdick  :  1995  MRN:  [id number]  Date of Admission: 2022    Subjective     Patient is a 27 y.o. female presents with chief complaint of acute, moderate headache pain.  Onset of symptoms was abrupt starting 2 days ago.  Symptoms are associated/aggravated by standing. Symptoms improve with lying down    The following portions of the patients history were reviewed and updated as appropriate: current medications, allergies, past medical history, past surgical history, past family history, past social history and problem list                Objective     Past Medical History:   Past Medical History:   Diagnosis Date   • ADHD    • Anxiety    • Asthma    • Back pain    • Depression    • Headache    • Visual impairment      Past Surgical History:   Past Surgical History:   Procedure Laterality Date   • FOOT SURGERY Left 2009    left foot had non cancer mass   • FOOT SURGERY Left 2010    left foot had non cancer mass again   • NOSE SURGERY Bilateral 2021   • SINUS SURGERY     • TONSILLECTOMY AND ADENOIDECTOMY Bilateral 2020   • WISDOM TOOTH EXTRACTION Bilateral      Family History:   Family History   Problem Relation Age of Onset   • Multiple sclerosis Mother    • Thyroid disease Mother    • Calcium disorder Mother    • Cataracts Mother    • Cancer Mother    • Thyroid nodules Mother    • Stroke Mother    • Osteoporotic fracture Mother    • Heart attack Father    • Heart disease Father    • Parkinsonism Father    • Abnormal EKG Father    • Allergies Father    • Post-traumatic stress disorder Father    • Stroke Father    • Thyroid disease Brother    • Calcium disorder Brother    • Vision loss Brother    • Kidney disease Maternal Grandmother    • Kidney failure Maternal Grandmother    • Cancer Maternal Grandfather    • Throat cancer Maternal Grandfather    • Stroke Paternal Grandmother    • Heart  disease Paternal Grandmother    • Cancer Paternal Grandfather      Social History:   Social History     Socioeconomic History   • Marital status:      Spouse name: Hector Burdick   • Number of children: 0   • Highest education level: Bachelor's degree (e.g., BA, AB, BS)   Tobacco Use   • Smoking status: Never Smoker   • Smokeless tobacco: Never Used   Vaping Use   • Vaping Use: Never used   Substance and Sexual Activity   • Alcohol use: Not Currently     Alcohol/week: 0.0 standard drinks   • Drug use: Never   • Sexual activity: Yes     Partners: Male     Birth control/protection: Implant     Comment: Neplaxon       Vital Signs Range for the last 24 hours  Temperature: Temp:  [36.5 °C (97.7 °F)] 36.5 °C (97.7 °F)   Temp Source: Temp src: Infrared   BP: BP: (120)/(73) 120/73   Pulse: Heart Rate:  [88] 88   Respirations: Resp:  [16] 16   SPO2: SpO2:  [99 %] 99 %   O2 Amount (l/min):     O2 Devices Device (Oxygen Therapy): room air   Weight:           --------------------------------------------------------------------------------    Current Outpatient Medications   Medication Sig Dispense Refill   • albuterol (PROVENTIL) (2.5 MG/3ML) 0.083% nebulizer solution Take 1 vial by nebulization Every 4 (Four) Hours As Needed for Wheezing. 270 mL 12   • albuterol sulfate  (90 Base) MCG/ACT inhaler Inhale 2 puffs Every 4 (Four) Hours As Needed for Wheezing. 8 g 0   • montelukast (SINGULAIR) 10 MG tablet Take 1 tablet by mouth Every Night. 90 tablet 3   • ondansetron ODT (Zofran ODT) 8 MG disintegrating tablet Place 1 tablet on the tongue Every 8 (Eight) Hours As Needed for Nausea or Vomiting. Indications: Nausea and Vomiting 20 tablet 0   • Semaglutide-Weight Management (Wegovy) 2.4 MG/0.75ML solution auto-injector Inject 0.75 mL under the skin into the appropriate area as directed 1 (One) Time Per Week. 3 mL 1   • Spiriva Respimat 2.5 MCG/ACT aerosol solution inhaler Inhale 2 puffs Daily. 4 g 12   • SUMAtriptan  (IMITREX) 50 MG tablet Take 50 mg by mouth.       Current Facility-Administered Medications   Medication Dose Route Frequency Provider Last Rate Last Admin   • fentaNYL citrate (PF) (SUBLIMAZE) injection 50 mcg  50 mcg Intravenous PRN Harmeet Massey MD       • lidocaine (XYLOCAINE) 1 % injection 1 mL  1 mL Intradermal Once Harmeet Massey MD       • midazolam (VERSED) injection 1 mg  1 mg Intravenous PRN Harmeet Massey MD       • sodium chloride 0.9 % flush 10 mL  10 mL Intravenous Q12H Harmeet Massey MD       • sodium chloride 0.9 % flush 10 mL  10 mL Intravenous PRN Harmeet Massey MD           --------------------------------------------------------------------------------  Assessment/Plan      Anesthesia Evaluation     Patient summary reviewed and Nursing notes reviewed   NPO Solid Status: > 8 hours  NPO Liquid Status: > 2 hours    Pain impairs ability to perform ADLs: Sleeping  Modalities previously tried to control pain with limited effectiveness within the last 4-6 weeks: Rest     Airway   Mallampati: II  TM distance: >3 FB  Neck ROM: full  Dental - normal exam     Pulmonary - normal exam   (+) asthma,  Cardiovascular - negative cardio ROS and normal exam        Neuro/Psych- neuro exam normal  (+) headaches, psychiatric history Anxiety and Depression,    GI/Hepatic/Renal/Endo - negative ROS     Musculoskeletal (-) normal exam    (+) back pain, neck pain, radiculopathy  Abdominal  - normal exam   Substance History - negative use     OB/GYN negative ob/gyn ROS         Other                   Diagnosis and Plan    Treatment Plan  ASA 2      Procedures: Epidural blood patch, With fluoroscopy,       Anesthetic plan and risks discussed with patient.          Diagnosis     * Post-dural puncture headache [G97.1]

## 2022-04-25 NOTE — ANESTHESIA PROCEDURE NOTES
Blood Patch    Pre-sedation assessment completed: 4/25/2022 1:30 PM    Patient reassessed immediately prior to procedure    Patient location during procedure: pain clinic  Blood patch placed: 4/25/2022 1:30 PM  Stop Time:4/25/2022 1:45 PM    Indications for Blood Patch: headache and inadvertent dural puncture  Staff  Anesthesiologist: Harmeet Massey MD  Preanesthetic Checklist  Completed: patient identified, IV checked, site marked, risks and benefits discussed, surgical consent, monitors and equipment checked, pre-op evaluation and timeout performed  Blood Patch Prep  Patient position: prone  Sterile Tech: gloves, cap, mask and sterile barrier.  Prep: ChloraPrep  Patient monitoring: blood pressure monitoring, continuous pulse ox and EKG  Location: L2-L3  Blood Patch Procedure  Sedation:yes    Approach: midline   Guidance: fluoroscopy  Solution used: autologous blood  Blood Patch Source:venous    Venous blood amount injected (mL): 22mL.  Assessment  Patient tolerance:patient tolerated the procedure well with no apparent complications  Complications:no  Additional Notes  Diagnosis - dural puncture headache.    Fluoroscopy was used for needle guidance and placement.      Diagnosis   Post-Op Diagnosis Codes:     * Post-dural puncture headache (G97.1)

## 2022-04-26 RX ORDER — FLUCONAZOLE 150 MG/1
150 TABLET ORAL ONCE
Qty: 2 TABLET | Refills: 1 | Status: SHIPPED | OUTPATIENT
Start: 2022-04-26 | End: 2022-04-28

## 2022-04-28 ENCOUNTER — TELEPHONE (OUTPATIENT)
Dept: NEUROSURGERY | Facility: CLINIC | Age: 27
End: 2022-04-28

## 2022-04-28 NOTE — TELEPHONE ENCOUNTER
PT CALLED BACK IN HAVING ISSUES WITH HEADACHES DIZZINESS HIGH BLOOD PRESSURE PAIN IN NECK    TRANS TO OFFICE RENATO

## 2022-04-28 NOTE — TELEPHONE ENCOUNTER
She called and c/o neck pain, headaches, and dizziness since myelogram with increased BP.  She had a blood patch on 4/25 and she had some relief, but continues to c/o pain at 7.5/10.  I will call to attempt to arrange another blood patch,in the AM.  In addition, I explained that she will need to follow up with her PCP re: BP.

## 2022-04-29 ENCOUNTER — HOSPITAL ENCOUNTER (OUTPATIENT)
Dept: GENERAL RADIOLOGY | Facility: HOSPITAL | Age: 27
Discharge: HOME | End: 2022-04-29

## 2022-04-29 ENCOUNTER — HOSPITAL ENCOUNTER (OUTPATIENT)
Dept: PAIN MEDICINE | Facility: HOSPITAL | Age: 27
Discharge: HOME | End: 2022-04-29

## 2022-04-29 ENCOUNTER — ANESTHESIA (OUTPATIENT)
Dept: PAIN MEDICINE | Facility: HOSPITAL | Age: 27
End: 2022-04-29

## 2022-04-29 ENCOUNTER — ANESTHESIA EVENT (OUTPATIENT)
Dept: PAIN MEDICINE | Facility: HOSPITAL | Age: 27
End: 2022-04-29

## 2022-04-29 VITALS
HEART RATE: 75 BPM | SYSTOLIC BLOOD PRESSURE: 115 MMHG | RESPIRATION RATE: 14 BRPM | TEMPERATURE: 97.7 F | DIASTOLIC BLOOD PRESSURE: 75 MMHG | OXYGEN SATURATION: 99 %

## 2022-04-29 DIAGNOSIS — R52 PAIN: ICD-10-CM

## 2022-04-29 PROCEDURE — 77003 FLUOROGUIDE FOR SPINE INJECT: CPT

## 2022-04-29 PROCEDURE — 25010000002 MIDAZOLAM PER 1 MG: Performed by: ANESTHESIOLOGY

## 2022-04-29 RX ORDER — SODIUM CHLORIDE 0.9 % (FLUSH) 0.9 %
10 SYRINGE (ML) INJECTION AS NEEDED
Status: DISCONTINUED | OUTPATIENT
Start: 2022-04-29 | End: 2022-04-30 | Stop reason: HOSPADM

## 2022-04-29 RX ORDER — SODIUM CHLORIDE 0.9 % (FLUSH) 0.9 %
10 SYRINGE (ML) INJECTION EVERY 12 HOURS SCHEDULED
Status: DISCONTINUED | OUTPATIENT
Start: 2022-04-29 | End: 2022-04-30 | Stop reason: HOSPADM

## 2022-04-29 RX ORDER — MIDAZOLAM HYDROCHLORIDE 1 MG/ML
1 INJECTION INTRAMUSCULAR; INTRAVENOUS AS NEEDED
Status: DISCONTINUED | OUTPATIENT
Start: 2022-04-29 | End: 2022-04-30 | Stop reason: HOSPADM

## 2022-04-29 RX ORDER — FENTANYL CITRATE 50 UG/ML
50 INJECTION, SOLUTION INTRAMUSCULAR; INTRAVENOUS AS NEEDED
Status: DISCONTINUED | OUTPATIENT
Start: 2022-04-29 | End: 2022-04-30 | Stop reason: HOSPADM

## 2022-04-29 RX ORDER — LIDOCAINE HYDROCHLORIDE 10 MG/ML
1 INJECTION, SOLUTION INFILTRATION; PERINEURAL ONCE
Status: DISCONTINUED | OUTPATIENT
Start: 2022-04-29 | End: 2022-04-30 | Stop reason: HOSPADM

## 2022-04-29 RX ADMIN — MIDAZOLAM 2 MG: 1 INJECTION INTRAMUSCULAR; INTRAVENOUS at 10:30

## 2022-04-29 NOTE — INTERVAL H&P NOTE
[facility]  H&P reviewed. No changes since last visit.  Patient states   slight improvement since the last procedure/injection; but pain still has continued significant pain. Patient was able to go to work today but with difficulty.    Diagnosis     * Post-dural puncture headache [G97.1]      Airway assessed since last visit. Airway class equals: 2.

## 2022-04-29 NOTE — ANESTHESIA PROCEDURE NOTES
Blood Patch    Pre-sedation assessment completed: 4/29/2022 10:21 AM    Patient reassessed immediately prior to procedure    Patient location during procedure: pain clinic  Blood patch placed: 4/29/2022 10:21 AM  Stop Time:4/29/2022 10:36 AM    Indications for Blood Patch: headache and inadvertent dural puncture  Staff  Anesthesiologist: Harmeet Massey MD  Preanesthetic Checklist  Completed: patient identified, IV checked, site marked, risks and benefits discussed, surgical consent, monitors and equipment checked, pre-op evaluation and timeout performed  Blood Patch Prep  Patient position: prone  Sterile Tech: gloves, cap, mask and sterile barrier.  Prep: ChloraPrep  Patient monitoring: blood pressure monitoring, continuous pulse ox and EKG  Location: L3-L4  Blood Patch Procedure  Sedation:yes    Approach: midline   Guidance: fluoroscopy  Solution used: autologous blood  Blood Patch Source:venous    Amount injected: 20 mL  Assessment  Patient tolerance:patient tolerated the procedure well with no apparent complications  Complications:no  Additional Notes  Diagnosis - dural puncture headache.    Fluoroscopy was used for needle guidance and placement.      Diagnosis   Post-Op Diagnosis Codes:     * Post-dural puncture headache (G97.1)

## 2022-04-29 NOTE — TELEPHONE ENCOUNTER
PT RETURNED CALL WITH WORSENING SYMPTOMS.  PT STATES HER HEADACHE, NAUSEA, VOMITING, DIZZINESS WERE AT THEIR WORST LAST NIGHT.      WARM TRANSFERRED PT TO [facility]

## 2022-04-29 NOTE — TELEPHONE ENCOUNTER
Patient called this morning stating last night was the worst she has felt.   Patient stated that she is still has vomiting,diarrhea, headache, and dizziness.  B/P issues are resolved but spiked a low grade temp of 99.  She rates 9/10.  Please advise.

## 2022-05-03 NOTE — PROGRESS NOTES
"Subjective   Patient ID: Anayeli Burdick is a 27 y.o. female is here today for follow-up with a new Total Myelogram that was done on 04.21.2022 for neck and back pain.    Today patient states that she has been having dizziness, N/V, blurred vision and HA's since her myelogram.  Patient states that when she has HA's she has leaking from her nose and and B/L ears. Patient also is having severe back and neck pain.     Patient, Provider, and MA are all wearing a mask in our office today.     History of Present Illness     This patient continues with severe pain in her lower back as well as her mid back.  She has numbness and tingling in her hands and pain in both of her legs.  She is still having some headaches after her myelogram.  She is able to sit upright.  She has had 2 blood patches.    The following portions of the patient's history were reviewed and updated as appropriate: allergies, current medications, past family history, past medical history, past social history, past surgical history and problem list.    Review of Systems   Constitutional: Negative for chills and fever.   HENT: Negative for congestion.    Eyes: Positive for visual disturbance.   Genitourinary: Negative for difficulty urinating and dysuria.   Musculoskeletal: Positive for back pain, neck pain and neck stiffness.   Neurological: Positive for dizziness, light-headedness, numbness and headaches.       I have reviewed the review of systems as documented by my MA.      Objective     Vitals:    05/26/22 1045   BP: 136/80   Cuff Size: Adult   Pulse: 78   Temp: 98 °F (36.7 °C)   Weight: 102 kg (225 lb)   Height: 160 cm (63\")     Body mass index is 39.86 kg/m².      Physical Exam  Neurological:      Mental Status: She is alert and oriented to person, place, and time.       Neurologic Exam     Mental Status   Oriented to person, place, and time.           Assessment & Plan   Independent Review of Radiographic Studies:      I personally reviewed the " images from the following studies.    I reviewed her plain films, myelogram, and CT scan myself.  The plain films of the cervical spine show some degenerative change but nothing severe.  There is no evidence of abnormal movement on flexion and extension.  The same is true in the lumbar spine.  On the myelogram itself the lumbar portion of the myelogram does not show any evidence of nerve root compression.  On the lateral film there is no disc bulging to speak of.  In the cervical spine I do not see any evidence of nerve root compression either.  The thoracic portion of the myelogram looks okay.  On the post myelographic CT scan there was just a little disc bulging at L3-4 but otherwise no evidence of any abnormalities.    Medical Decision Making:      I told the patient I do not see anything here that I would recommend surgery for.  I would not recommend doing any further epidural blood patches at this point.  I think she will gradually get better from the post myelogram reaction.  I did suggest that we get her into see a different pain management doctor.    Diagnoses and all orders for this visit:    1. Neck pain (Primary)  -     Ambulatory Referral to Pain Management    2. Pain in thoracic spine  -     Ambulatory Referral to Pain Management    3. Chronic bilateral low back pain with bilateral sciatica  -     Ambulatory Referral to Pain Management      Return if symptoms worsen or fail to improve.

## 2022-05-05 RX ORDER — SEMAGLUTIDE 2.4 MG/.75ML
0.75 INJECTION, SOLUTION SUBCUTANEOUS WEEKLY
Qty: 3 ML | Refills: 2 | Status: SHIPPED | OUTPATIENT
Start: 2022-05-05 | End: 2022-06-01 | Stop reason: SDUPTHER

## 2022-05-26 ENCOUNTER — OFFICE VISIT (OUTPATIENT)
Dept: NEUROSURGERY | Facility: CLINIC | Age: 27
End: 2022-05-26

## 2022-05-26 ENCOUNTER — TELEPHONE (OUTPATIENT)
Dept: INTERNAL MEDICINE | Facility: CLINIC | Age: 27
End: 2022-05-26

## 2022-05-26 VITALS
HEIGHT: 63 IN | SYSTOLIC BLOOD PRESSURE: 136 MMHG | DIASTOLIC BLOOD PRESSURE: 80 MMHG | HEART RATE: 78 BPM | TEMPERATURE: 98 F | WEIGHT: 225 LBS | BODY MASS INDEX: 39.87 KG/M2

## 2022-05-26 DIAGNOSIS — M54.50 CHRONIC BILATERAL LOW BACK PAIN, UNSPECIFIED WHETHER SCIATICA PRESENT: Primary | ICD-10-CM

## 2022-05-26 DIAGNOSIS — M54.42 CHRONIC BILATERAL LOW BACK PAIN WITH BILATERAL SCIATICA: ICD-10-CM

## 2022-05-26 DIAGNOSIS — M54.6 PAIN IN THORACIC SPINE: ICD-10-CM

## 2022-05-26 DIAGNOSIS — G89.29 CHRONIC BILATERAL LOW BACK PAIN, UNSPECIFIED WHETHER SCIATICA PRESENT: Primary | ICD-10-CM

## 2022-05-26 DIAGNOSIS — G89.29 CHRONIC BILATERAL LOW BACK PAIN WITH BILATERAL SCIATICA: ICD-10-CM

## 2022-05-26 DIAGNOSIS — M54.2 NECK PAIN: Primary | ICD-10-CM

## 2022-05-26 DIAGNOSIS — M54.41 CHRONIC BILATERAL LOW BACK PAIN WITH BILATERAL SCIATICA: ICD-10-CM

## 2022-05-26 PROCEDURE — 99214 OFFICE O/P EST MOD 30 MIN: CPT | Performed by: NEUROLOGICAL SURGERY

## 2022-05-26 NOTE — TELEPHONE ENCOUNTER
----- Message from Sadia Mota sent at 5/26/2022  1:25 PM EDT -----  Regarding: FW: Hello milton    ----- Message -----  From: Anayeli Burdick  Sent: 5/26/2022   1:18 PM EDT  To: [facility] [place]  Subject: Hello milton                                    Can you make a referral for Dr. Tate Boyd (orthopedic & spine surgery MD) at [facility], so i can go and see me for my issues.     Thanks

## 2022-06-01 RX ORDER — SEMAGLUTIDE 2.4 MG/.75ML
2.4 INJECTION, SOLUTION SUBCUTANEOUS WEEKLY
Qty: 3 ML | Refills: 2 | Status: SHIPPED | OUTPATIENT
Start: 2022-06-01 | End: 2022-08-24 | Stop reason: SDUPTHER

## 2022-06-21 ENCOUNTER — TRANSCRIBE ORDERS (OUTPATIENT)
Dept: PHYSICAL THERAPY | Facility: CLINIC | Age: 27
End: 2022-06-21

## 2022-06-21 DIAGNOSIS — M54.16 RADICULOPATHY, LUMBAR REGION: ICD-10-CM

## 2022-06-21 DIAGNOSIS — M47.816 LUMBAR SPONDYLOSIS: Primary | ICD-10-CM

## 2022-08-02 ENCOUNTER — TREATMENT (OUTPATIENT)
Dept: PHYSICAL THERAPY | Facility: CLINIC | Age: 27
End: 2022-08-02

## 2022-08-02 DIAGNOSIS — G89.29 CHRONIC BILATERAL LOW BACK PAIN WITH BILATERAL SCIATICA: Primary | ICD-10-CM

## 2022-08-02 DIAGNOSIS — M54.42 CHRONIC BILATERAL LOW BACK PAIN WITH BILATERAL SCIATICA: Primary | ICD-10-CM

## 2022-08-02 DIAGNOSIS — Z74.09 IMPAIRED MOBILITY AND ACTIVITIES OF DAILY LIVING: ICD-10-CM

## 2022-08-02 DIAGNOSIS — Z78.9 IMPAIRED MOBILITY AND ACTIVITIES OF DAILY LIVING: ICD-10-CM

## 2022-08-02 DIAGNOSIS — M54.41 CHRONIC BILATERAL LOW BACK PAIN WITH BILATERAL SCIATICA: Primary | ICD-10-CM

## 2022-08-02 PROCEDURE — 97163 PT EVAL HIGH COMPLEX 45 MIN: CPT | Performed by: PHYSICAL THERAPIST
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
## 2022-08-09 ENCOUNTER — TREATMENT (OUTPATIENT)
Dept: PHYSICAL THERAPY | Facility: CLINIC | Age: 27
End: 2022-08-09

## 2022-08-09 DIAGNOSIS — G89.29 CHRONIC BILATERAL LOW BACK PAIN WITH BILATERAL SCIATICA: Primary | ICD-10-CM

## 2022-08-09 DIAGNOSIS — M54.42 CHRONIC BILATERAL LOW BACK PAIN WITH BILATERAL SCIATICA: Primary | ICD-10-CM

## 2022-08-09 DIAGNOSIS — Z74.09 IMPAIRED MOBILITY AND ACTIVITIES OF DAILY LIVING: ICD-10-CM

## 2022-08-09 DIAGNOSIS — Z78.9 IMPAIRED MOBILITY AND ACTIVITIES OF DAILY LIVING: ICD-10-CM

## 2022-08-09 DIAGNOSIS — M54.41 CHRONIC BILATERAL LOW BACK PAIN WITH BILATERAL SCIATICA: Primary | ICD-10-CM

## 2022-08-09 PROCEDURE — 97113 AQUATIC THERAPY/EXERCISES: CPT | Performed by: PHYSICAL THERAPIST

## 2022-08-09 NOTE — PROGRESS NOTES
Physical Therapy Daily Progress Note    Patient: Anayeli Burdick   : 1995  Diagnosis/ICD-10 Code:  Chronic bilateral low back pain with bilateral sciatica [M54.42, M54.41, G89.29]  Referring practitioner: MARQUISE Alicea  Date of Initial Visit: Type: THERAPY  Noted: 2022  Today's Date: 2022  Patient seen for 2 sessions             Subjective Evaluation    History of Present Illness    Subjective comment: Pain 8/10 in lower back and throuhgout R hip/pelvic region.  Pain worse with rainy weather and sitting/standing too long - nothing really seems to help it.         Objective     AQUATIC EX:    Water Walk   Forward, sideways x 2 laps ea, backwards x 1 lap w/ significantly shortened step length  Stretch 1   Calf 20 sec ea  Stretch 2   HS 20 sec ea  Stretch 3   *Next? Piriformis, seated  Stretch Other 1  -  Stretch Other 2  -  Vertical Traction  LN/rail 3 x 2-3 min  Abdominals   SN x 10   Clams    12x, comfortable range (seated)  Hip Abd/Add   10x, small ROM  Hip Flex/Ext   10x, comfortable ROM   March in Place  12x, comfortable ROM  Mini Squat   10x  Toe/Heel Raises    Uni-Squat   -  Uni-Clock   -  Step Ups   -  Bicycle   1-2 min, seated  Flutter/Scissor   / 12, seated  Exercise 1   -  Exercise 2   -  Exercise 3   -  Exercise 4   -  Exercise 5  -  Exercise 6  -      Assessment & Plan     Assessment    Assessment details: Patient seen today for initial aquatic therapy session including education and instruction in basic aquatic ex/activity for mobility, flexibility, and strength/stabilization.  She rated pain 8/10 at start of session and indicated pain went up to 10/10 after walking in the water so took a decompression break before doing any additional ex. Instructed her to focus on upright posture and keep ex performance in comfortable range.  Intermittent decompression were provided after every few standing ex.  Patient moves slowly/cautiously with all ex/activity during session.   PT provided demonstration and cuing throughout session for optimal posture, core/glut activation, and correct form/technique with ex/activity.    Plan:  Assess response to initial aquatic session and modify/progress as appropriate.                   Timed:  Aquatic Therapy    39     mins 16553;    Stella Zamorano, PT  Physical Therapist    [place] License: [id number]

## 2022-08-12 ENCOUNTER — TREATMENT (OUTPATIENT)
Dept: PHYSICAL THERAPY | Facility: CLINIC | Age: 27
End: 2022-08-12

## 2022-08-12 DIAGNOSIS — M54.41 CHRONIC BILATERAL LOW BACK PAIN WITH BILATERAL SCIATICA: Primary | ICD-10-CM

## 2022-08-12 DIAGNOSIS — Z78.9 IMPAIRED MOBILITY AND ACTIVITIES OF DAILY LIVING: ICD-10-CM

## 2022-08-12 DIAGNOSIS — M54.42 CHRONIC BILATERAL LOW BACK PAIN WITH BILATERAL SCIATICA: Primary | ICD-10-CM

## 2022-08-12 DIAGNOSIS — Z74.09 IMPAIRED MOBILITY AND ACTIVITIES OF DAILY LIVING: ICD-10-CM

## 2022-08-12 DIAGNOSIS — G89.29 CHRONIC BILATERAL LOW BACK PAIN WITH BILATERAL SCIATICA: Primary | ICD-10-CM

## 2022-08-12 PROCEDURE — 97113 AQUATIC THERAPY/EXERCISES: CPT | Performed by: PHYSICAL THERAPIST

## 2022-08-12 NOTE — PROGRESS NOTES
"Physical Therapy Daily Progress Note    Patient: Anayeli Burdick   : 1995  Diagnosis/ICD-10 Code:  Chronic bilateral low back pain with bilateral sciatica [M54.42, M54.41, G89.29]  Referring practitioner: MARQUISE Alicea  Date of Initial Visit: Type: THERAPY  Noted: 2022  Today's Date: 2022  Patient seen for 3 sessions             Subjective Evaluation    History of Present Illness    Subjective comment: Pain 9/10 this afternoon.  \"Not good\" after first session in the pool.  Both legs were swollen and I hurt.  Swelling is almost gone now.       Objective      AQUATIC EX:     Water Walk                 Forward, sideways x 1 lap ea  March Walk  1 lap  Stretch 1                      Calf 20 sec x 2 ea  Stretch 2                      HS 20 sec x 2 ea  Stretch 3                      SKTC w/ noodle 20 sec ea  Stretch Other 1           Wall x 30 sec  Stretch Other 2           *Next? Piriformis, seated  Vertical Traction          LN/rail 5 x 1-1/2 to 2 min  Abdominals                 SN x 12   Clams                          12x, small comfortable ROM (suspeded w/ LN/rail)  Hip Abd/Add                10x, small ROM  Hip Flex/Ext                 10x, comfortable ROM   March in Place            12x, comfortable ROM  Mini Squat                   10x  Toe/Heel Raises           Uni-Squat                    -  Uni-Clock                    -  Step Ups                     -  Bicycle                         1-2 min, suspended w/ LN/rail support  Flutter/Scissor              / 12, seated  Exercise 1                   -  Exercise 2                   -  Exercise 3                   -  Exercise 4                   -  Exercise 5                   -  Exercise 6                   -       Assessment & Plan     Assessment    Assessment details: Patient states it was \"not good\" following initial aquatic therapy session noted increased pain and B LE swelling.  Continued with previous aquatic ex/activity for " mobility, flexibility, and strength/stabilization.  Increased reps on a few ex, added SKTC, and tried wall stretch this visit.  Also tried suspended clams, bicycle in place of seated which she completed in small ROM, guarded with movement (? 2/2 fear/apprehension of increased pain), however, did feel like suspended bicycle was better for her today than seated last time.  With attempted wall stretch she didn't really notice any difference in benefit compared to decompression float.  Emphasis placed on engaging abdominals to support spine and keeping ex performance in comfortable range.  Multiple decompression breaks taken prn throughout session after every 2-3 ex.  All ex/activity performed slowly/cautiously in >/= 4' depth this session w/ trial of suspended ex vs seated for greater decompression.  Demonstration and cuing provided throughout session for optimal posture, core/glut activation, and correct form/technique with ex/activity.      Plan:  Continue to assess patient response to aquatic ex/activity and modify/progress as appropriate.  May consider adding UE/core ex.                 Timed:  Aquatic Therapy    41     mins [id number];    Stella Zamorano PT  Physical Therapist    [place] License: [id number]

## 2022-08-16 ENCOUNTER — TREATMENT (OUTPATIENT)
Dept: PHYSICAL THERAPY | Facility: CLINIC | Age: 27
End: 2022-08-16

## 2022-08-16 DIAGNOSIS — Z74.09 IMPAIRED MOBILITY AND ACTIVITIES OF DAILY LIVING: ICD-10-CM

## 2022-08-16 DIAGNOSIS — M54.41 CHRONIC BILATERAL LOW BACK PAIN WITH BILATERAL SCIATICA: Primary | ICD-10-CM

## 2022-08-16 DIAGNOSIS — G89.29 CHRONIC BILATERAL LOW BACK PAIN WITH BILATERAL SCIATICA: Primary | ICD-10-CM

## 2022-08-16 DIAGNOSIS — Z78.9 IMPAIRED MOBILITY AND ACTIVITIES OF DAILY LIVING: ICD-10-CM

## 2022-08-16 DIAGNOSIS — M54.42 CHRONIC BILATERAL LOW BACK PAIN WITH BILATERAL SCIATICA: Primary | ICD-10-CM

## 2022-08-16 PROCEDURE — 97113 AQUATIC THERAPY/EXERCISES: CPT | Performed by: PHYSICAL THERAPIST

## 2022-08-16 NOTE — PROGRESS NOTES
"Physical Therapy Daily Progress Note    Patient: Anayeli Burdick   : 1995  Diagnosis/ICD-10 Code:  Chronic bilateral low back pain with bilateral sciatica [M54.42, M54.41, G89.29]  Referring practitioner: RIKI Alicea*  Date of Initial Visit: Type: THERAPY  Noted: 2022  Today's Date: 2022  Patient seen for 4 sessions             Subjective Evaluation    History of Present Illness    Subjective comment: Saturday, I had a lot of leg cramping and  I had intermittent \"hit or miss\" leg cramping.       Objective     AQUATIC EX:     Water Walk                 Forward, sideways x 1 lap ea  March Walk                 1 lap  Stretch 1                      Calf 20 sec x 2 ea  Stretch 2                      HS 20 sec x 2 ea  Stretch 3                      SKTC w/ noodle 30 sec ea  Stretch Other 1            Wall x 30 sec  Stretch Other 2           Piriformis attempted seated but d/c 2/2 pain \"it hurts\"   Hip sweeps   10x, SN under bent knee (comfortable range)  Vertical Traction          LN/rail 3 x 1-1/2 to 2 min  Abdominals                 SN x 15   Clams                          12x, small comfortable ROM (suspeded w/ LN/rail)  Hip Abd/Add                10x, small ROM  Hip Flex/Ext                 10x, comfortable ROM   March in Place            15x, comfortable ROM  Mini Squat                   10x  Toe/Heel Raises         15 / 15  Uni-Squat                    -  Uni-Clock                    -  Step Ups                     -  Bicycle                         1-2 min, suspended w/ LN/rail support  Flutter/Scissor             - / 15, seated  Exercise 1                   Alt paddle rows x 10 ea arm (open paddles)  Exercise 2                   -  Exercise 3                   -  Exercise 4                   -  Exercise 5                   -  Exercise 6                   -    Assessment & Plan     Assessment    Assessment details: Patient reports a lot of leg cramping the next day after last " "session.  Continued with previous aquatic ex/activity for mobility, flexibility, and strength/stabilization.  Increased reps on a few ex and added hip sweeps, alt paddle rows this visit.  Attempted seated piriformis stretch however, discontinued due to patient report \"it hurts\" just getting one leg up over the other.  She continues to demonstrate slow/guarded movement with ex/activity.  Instructed her to stand tall and engage abdominals to help reduce strain on spine during ex performance.  Intermittent decompression breaks taken prn throughout session after every few ex.  All ex/activity performed in >/= 4' depth to maximize spinal decompression.  Demonstration and cuing provided throughout session for optimal posture, core/glut activation, and correct form/technique with ex/activity.      Plan:  Continue to assess patient response to aquatic ex/activity and modify/progress as appropriate.  May consider adding more UE/core ex in future sessions.                 Timed:  Aquatic Therapy    40     mins 04052;    Stella Zamorano PT  Physical Therapist    [place] License: [id number]  "

## 2022-08-23 ENCOUNTER — TREATMENT (OUTPATIENT)
Dept: PHYSICAL THERAPY | Facility: CLINIC | Age: 27
End: 2022-08-23

## 2022-08-23 DIAGNOSIS — M54.42 CHRONIC BILATERAL LOW BACK PAIN WITH BILATERAL SCIATICA: Primary | ICD-10-CM

## 2022-08-23 DIAGNOSIS — Z78.9 IMPAIRED MOBILITY AND ACTIVITIES OF DAILY LIVING: ICD-10-CM

## 2022-08-23 DIAGNOSIS — M54.41 CHRONIC BILATERAL LOW BACK PAIN WITH BILATERAL SCIATICA: Primary | ICD-10-CM

## 2022-08-23 DIAGNOSIS — G89.29 CHRONIC BILATERAL LOW BACK PAIN WITH BILATERAL SCIATICA: Primary | ICD-10-CM

## 2022-08-23 DIAGNOSIS — Z74.09 IMPAIRED MOBILITY AND ACTIVITIES OF DAILY LIVING: ICD-10-CM

## 2022-08-23 PROCEDURE — 97113 AQUATIC THERAPY/EXERCISES: CPT | Performed by: PHYSICAL THERAPIST
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
## 2022-08-24 RX ORDER — SEMAGLUTIDE 2.4 MG/.75ML
2.4 INJECTION, SOLUTION SUBCUTANEOUS WEEKLY
Qty: 3 ML | Refills: 2 | Status: SHIPPED | OUTPATIENT
Start: 2022-08-24 | End: 2022-09-23 | Stop reason: SDUPTHER

## 2022-08-26 ENCOUNTER — TREATMENT (OUTPATIENT)
Dept: PHYSICAL THERAPY | Facility: CLINIC | Age: 27
End: 2022-08-26

## 2022-08-26 DIAGNOSIS — M54.41 CHRONIC BILATERAL LOW BACK PAIN WITH BILATERAL SCIATICA: Primary | ICD-10-CM

## 2022-08-26 DIAGNOSIS — Z74.09 IMPAIRED MOBILITY AND ACTIVITIES OF DAILY LIVING: ICD-10-CM

## 2022-08-26 DIAGNOSIS — M54.42 CHRONIC BILATERAL LOW BACK PAIN WITH BILATERAL SCIATICA: Primary | ICD-10-CM

## 2022-08-26 DIAGNOSIS — Z78.9 IMPAIRED MOBILITY AND ACTIVITIES OF DAILY LIVING: ICD-10-CM

## 2022-08-26 DIAGNOSIS — G89.29 CHRONIC BILATERAL LOW BACK PAIN WITH BILATERAL SCIATICA: Primary | ICD-10-CM

## 2022-08-26 PROCEDURE — 97113 AQUATIC THERAPY/EXERCISES: CPT | Performed by: PHYSICAL THERAPIST

## 2022-08-26 NOTE — PROGRESS NOTES
Physical Therapy Daily Progress Note    Patient: Anayeli Burdick   : 1995  Diagnosis/ICD-10 Code:  Chronic bilateral low back pain with bilateral sciatica [M54.42, M54.41, G89.29]  Referring practitioner: RIKI Alicea*  Date of Initial Visit: Type: THERAPY  Noted: 2022  Today's Date: 2022  Patient seen for 6 sessions             Subjective Evaluation    History of Present Illness    Subjective comment: Pain not terrible today, about 8/10.         Objective     AQUATIC EX:     Water Walk                 Forward, sideways x 2 laps ea  March Walk                 2 laps  Stretch 1                      Calf 20 sec x 2 ea  Stretch 2                      HS 20 sec x 2 ea  Stretch 3                      SKTC w/ noodle 30 sec ea  Stretch Other 1            Wall x 30 sec  Stretch Other 2           -   Hip sweeps                 10x, SN under bent knee (comfortable range) - *deferred  Vertical Traction          LN/rail 2 x 1-1/2 to 2 min  Abdominals                 SN x 15   Clams                          12x, small comfortable ROM (suspeded w/ LN/rail) - *deferred  Hip Abd/Add                12x, small ROM  Hip Flex/Ext                 12x, comfortable ROM   March in Place            15x, comfortable ROM - *deferred  Mini Squat                   12x  Toe/Heel Raises         15 / 15 - *deferred  Uni-Squat                    -  Uni-Clock                    -  Step Ups                     -  Bicycle                         1-2 min, suspended w/ LN/rail support  Flutter/Scissor             - / 15, seated  Exercise 1                   Alt paddle rows x 15 ea arm (open paddles)  Exercise 2                   Paddle Stirs x 10 ea direction (open paddles)  Exercise 3                   Shoulder ER/IR x 15 (open paddles)  Exercise 4                   Alternate shoulder Flex/Ext x 15, small range  Exercise 5                   -  Exercise 6                   -      Assessment & Plan      Assessment    Assessment details: Patient reports pain not too bad today rating in 8/10 and states she saw pain management yesterday and was given a topical medication + some vitamins and MD told her he would order more therapy if needed.  Continued with previous aquatic ex/activity for mobility, flexibility, and strength/stabilization.  Increased reps on a few ex and added paddle stirs this visit.  She demonstrates slow/guarded movement with all ex/activity.  Emphasis placed on engaging abdominals to help support spine with ex/activity.  All ex/activity performed in >/= 4' depth to maximize spinal decompression.  She completed aquatic ex during today session without c/o increased pain/discomfort.  Demonstration and cuing provided throughout session for optimal posture, core/glut activation, and correct form/technique with ex/activity.      Plan:  Continue to assess patient response to aquatic ex/activity and modify/progress as appropriate.  May consider adding tuck ups and / or UE Hz Abd in future sessions.                 Timed:  Aquatic Therapy    39     mins [id number];    Stella Zamorano PT  Physical Therapist    [place] License: [id number]

## 2022-08-30 ENCOUNTER — TREATMENT (OUTPATIENT)
Dept: PHYSICAL THERAPY | Facility: CLINIC | Age: 27
End: 2022-08-30

## 2022-08-30 DIAGNOSIS — Z78.9 IMPAIRED MOBILITY AND ACTIVITIES OF DAILY LIVING: ICD-10-CM

## 2022-08-30 DIAGNOSIS — M54.42 CHRONIC BILATERAL LOW BACK PAIN WITH BILATERAL SCIATICA: Primary | ICD-10-CM

## 2022-08-30 DIAGNOSIS — Z74.09 IMPAIRED MOBILITY AND ACTIVITIES OF DAILY LIVING: ICD-10-CM

## 2022-08-30 DIAGNOSIS — M54.41 CHRONIC BILATERAL LOW BACK PAIN WITH BILATERAL SCIATICA: Primary | ICD-10-CM

## 2022-08-30 DIAGNOSIS — G89.29 CHRONIC BILATERAL LOW BACK PAIN WITH BILATERAL SCIATICA: Primary | ICD-10-CM

## 2022-08-30 PROCEDURE — 97113 AQUATIC THERAPY/EXERCISES: CPT | Performed by: PHYSICAL THERAPIST

## 2022-08-30 NOTE — PROGRESS NOTES
"Physical Therapy Daily Progress Note    Patient: Anayeli Burdick   : 1995  Diagnosis/ICD-10 Code:  Chronic bilateral low back pain with bilateral sciatica [M54.42, M54.41, G89.29]  Referring practitioner: MARQUISE Alicea  Date of Initial Visit: Type: THERAPY  Noted: 2022  Today's Date: 2022  Patient seen for 7 sessions             Subjective Evaluation    History of Present Illness    Subjective comment: Having a \"good\" day, pain about 8/10.       Objective     AQUATIC EX:     Water Walk                 Forward, sideways x 2 laps ea  March Walk                 2 laps  Stretch 1                      Calf 20 sec x 2 ea  Stretch 2                      HS 20 sec x 2 ea  Stretch 3                      SKTC w/ noodle 30 sec ea  Stretch Other 1            Wall x 30 sec x2  Stretch Other 2           -   Hip sweeps                 10x, SN under bent knee (comfortable range) - *deferred  Vertical Traction          LN/rail 2 x 1-1/2 to 2 min  Abdominals                 SN x 15   Clams                          12x, small comfortable ROM (suspeded w/ LN/rail) - *deferred  Hip Abd/Add                15x, small ROM  Hip Flex/Ext                 15x, comfortable ROM   March in Place            15x, comfortable ROM - *deferred  Mini Squat                   15x  Toe/Heel Raises         15 / 15 - *deferred  Uni-Squat                    -  Uni-Clock                    -  Step Ups                     -  Bicycle                         1-2 min, suspended w/ LN/rail support  Flutter/Scissor             - / 15, seated  Exercise 1                   Alt paddle rows x 15 ea arm (open paddles)  Exercise 2                   Paddle Stirs x 10 ea direction (open paddles)  Exercise 3                   Shoulder ER/IR x 15 (open paddles)  Exercise 4                   Alternate shoulder Flex/Ext x 15, small range  Exercise 5                   -  Exercise 6                   -    Assessment & Plan " "    Assessment    Assessment details: Patient reports today is a \"good\" day rating her pain 8/10.  Continued with most previous aquatic ex/activity for mobility, flexibility, and strength/stabilization.  Increased reps on a few ex this visit.  Instructed her to focus on engaging abdominals and performing ex/activity with controlled movement in comfortable range.  All ex/activity performed slowly/cautiously in >/= 4' depth (for maximal spinal decompression).  Demonstration and cuing provided throughout session for optimal posture, core/glut activation, and correct form/technique with ex/activity.  Discussed various options for continuation of aquatic ex/activity upon completion of formal PT  if deemed beneficial.    Plan:  Continue to assess patient response to aquatic ex/activity and modify/progress as appropriate.  May consider adding UE Hz Abd, leg press, hip circles, plank and/or progressing to LN for abdominal push downs in future.                 Timed:  Aquatic Therapy    40     mins [id number];    Stella Zamorano, PT  Physical Therapist    [place] License: [id number]  "

## 2022-09-23 RX ORDER — SEMAGLUTIDE 2.4 MG/.75ML
2.4 INJECTION, SOLUTION SUBCUTANEOUS WEEKLY
Qty: 3 ML | Refills: 4 | Status: SHIPPED | OUTPATIENT
Start: 2022-09-23

## 2022-12-28 RX ORDER — SEMAGLUTIDE 2.4 MG/.75ML
2.4 INJECTION, SOLUTION SUBCUTANEOUS WEEKLY
Qty: 3 ML | Refills: 4 | Status: SHIPPED | OUTPATIENT
Start: 2022-12-28

## 2023-01-03 ENCOUNTER — OFFICE VISIT (OUTPATIENT)
Dept: PSYCHIATRY | Facility: CLINIC | Age: 28
End: 2023-01-03
Payer: COMMERCIAL

## 2023-01-03 DIAGNOSIS — F90.0 ADHD, PREDOMINANTLY INATTENTIVE TYPE: Chronic | ICD-10-CM

## 2023-01-03 DIAGNOSIS — F33.1 MAJOR DEPRESSIVE DISORDER, RECURRENT EPISODE, MODERATE: Primary | Chronic | ICD-10-CM

## 2023-01-03 PROCEDURE — 90792 PSYCH DIAG EVAL W/MED SRVCS: CPT | Performed by: PSYCHIATRY & NEUROLOGY

## 2023-01-03 RX ORDER — VILOXAZINE HYDROCHLORIDE 200 MG/1
1 CAPSULE, EXTENDED RELEASE ORAL EVERY MORNING
Qty: 21 CAPSULE | Refills: 0 | COMMUNITY
Start: 2023-01-03 | End: 2023-02-02 | Stop reason: SINTOL
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
## 2023-01-04 ENCOUNTER — OFFICE VISIT (OUTPATIENT)
Dept: PSYCHIATRY | Facility: CLINIC | Age: 28
End: 2023-01-04
Payer: COMMERCIAL

## 2023-01-04 DIAGNOSIS — F33.1 MAJOR DEPRESSIVE DISORDER, RECURRENT EPISODE, MODERATE: Primary | Chronic | ICD-10-CM

## 2023-01-04 DIAGNOSIS — F43.10 PTSD (POST-TRAUMATIC STRESS DISORDER): ICD-10-CM

## 2023-01-04 PROCEDURE — 90791 PSYCH DIAGNOSTIC EVALUATION: CPT | Performed by: SOCIAL WORKER
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
## 2023-01-05 ENCOUNTER — TRANSCRIBE ORDERS (OUTPATIENT)
Dept: PHYSICAL THERAPY | Facility: CLINIC | Age: 28
End: 2023-01-05
Payer: COMMERCIAL

## 2023-01-05 DIAGNOSIS — S93.402A SPRAIN OF LEFT ANKLE, UNSPECIFIED LIGAMENT, INITIAL ENCOUNTER: Primary | ICD-10-CM

## 2023-01-06 ENCOUNTER — PATIENT ROUNDING (BHMG ONLY) (OUTPATIENT)
Dept: PSYCHIATRY | Facility: CLINIC | Age: 28
End: 2023-01-06
Payer: COMMERCIAL

## 2023-01-06 ENCOUNTER — TREATMENT (OUTPATIENT)
Dept: PHYSICAL THERAPY | Facility: CLINIC | Age: 28
End: 2023-01-06
Payer: OTHER MISCELLANEOUS

## 2023-01-06 DIAGNOSIS — R26.2 DIFFICULTY WALKING: ICD-10-CM

## 2023-01-06 DIAGNOSIS — S93.402D SPRAIN OF LEFT ANKLE, UNSPECIFIED LIGAMENT, SUBSEQUENT ENCOUNTER: Primary | ICD-10-CM

## 2023-01-06 PROCEDURE — 97530 THERAPEUTIC ACTIVITIES: CPT | Performed by: PHYSICAL THERAPIST

## 2023-01-06 PROCEDURE — 97110 THERAPEUTIC EXERCISES: CPT | Performed by: PHYSICAL THERAPIST

## 2023-01-06 PROCEDURE — 97161 PT EVAL LOW COMPLEX 20 MIN: CPT | Performed by: PHYSICAL THERAPIST

## 2023-01-06 NOTE — PROGRESS NOTES
Physical Therapy Initial Evaluation and Plan of Care  [address], [address], [place] [address]    Patient: Anayeli Burdick   : 1995  Diagnosis/ICD-10 Code:  Sprain of left ankle, unspecified ligament, subsequent encounter [S93.402D]  Referring practitioner: Grant Martinez PA-C    Subjective Evaluation    History of Present Illness  Date of onset: 2022  Mechanism of injury: Patient was helping work a code in a small patient room and she twisted her foot (inversion mechanism), causing her to fall.  She had pain and swelling immediately but continued working for 3 days and the symptoms continued to worsen.  She presented to [facility] where she was placed in a walking boot.  She uses a knee scooter for ambulation most of the time. She was prescribed steroids which she began taking yesterday.  She is applying ice to her ankle.  Her pain is constant, rated 10/10. She feels that her symptoms are worsening and she states she cannot tolerate even resting her (L) foot on the ground.      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)   Precautions and Work Restrictions: sit down duty only; sitting at camera monitorsQuality of life: good    Pain  Current pain rating: 10  At best pain rating: 10  At worst pain rating: 10  Location: (L) anterolateral ankle over sinus tarsi  Quality: throbbing  Relieving factors: support, relaxation, rest and ice (elevation)  Aggravating factors: ambulation, standing, squatting, stairs, movement and sleeping  Progression: worsening    Social Support  Lives in: multiple-level home (unable to navigate steps within her home)  Lives with: spouse    Hand dominance: right    Diagnostic Tests  X-ray: normal    Patient Goals  Patient goals for therapy: decreased pain, increased motion, increased strength, independence with ADLs/IADLs and return to work             Subjective Questionnaire: FAAM:0%    Objective          Tenderness     Additional Tenderness  Details  Max (+) TTP (L) peroneal tendons, (L) lateral malleolus, sinus tarsi, dorsum of (L) foot    Active Range of Motion   Left Ankle/Foot   Dorsiflexion (ke): with pain  Plantar flexion: 53 degrees with pain  Inversion: 26 degrees with pain    Right Ankle/Foot   Dorsiflexion (ke): 3 degrees   Plantar flexion: 66 degrees   Inversion: 39 degrees   Eversion: 12 degrees     Additional Active Range of Motion Details  (L) ankle dorsiflexion AROM lacking 38 deg from neutral, limited by pain  (L) ankle eversion AROM lacking 8 deg from neutral, limited by pain    Strength/Myotome Testing     Right Ankle/Foot   Dorsiflexion: 4  Plantar flexion: 4  Inversion: 4-  Eversion: 4-    Additional Strength Details  Patient unable to assume testing positions for (L) ankle MMT and is too limited by pain to tolerate    Swelling   Left Ankle/Foot   Figure 8 girth: 53.6.    Right Ankle/Foot   Figure 8 girth: 53.2.    Ambulation     Observational Gait   Gait: antalgic and asymmetric   Decreased walking speed, stride length, left stance time and right step length.   Left foot contact pattern: foot flat    Additional Observational Gait Details  Patient ambulating with (L) foot in walking boot without knee scooter this date with significant antalgia and compensation          Assessment & Plan     Assessment  Impairments: abnormal coordination, abnormal gait, abnormal muscle firing, abnormal or restricted ROM, activity intolerance, impaired balance, impaired physical strength, lacks appropriate home exercise program, pain with function and weight-bearing intolerance  Functional Limitations: lifting, sleeping, walking, uncomfortable because of pain, moving in bed, standing and stooping  Assessment details: Patient is a 27 y.o female who reports onset of (L) ankle pain at work on 12/28/2022 when she fell in a patient room while working a code.  Her (L) ankle pain and swelling worsened over the next 3 days and per patient report, continues to  worsen even now.  She reports symptoms constant 10/10 with inability to tolerate any weightbearing through (L) LE.  She has been placed in a walking boot and uses a knee scooter.  She is significantly limited in her ability to stand, walk, and negotiate steps.  She exhibits significant TTP (L) ankle/foot, mild swelling, profoundly decreased (L) ankle AROM, inability to tolerate strength testing, and severely compensated functional mobility.  Her FAAM score is 0% indicating a profound perceived level of functional limitation.  She will benefit from skilled PT services to address these deficits and assist patient in painfree return to all weightbearing and regular work activities.  Prognosis: good    Goals  Plan Goals: STGs: to be met in 4 weeks  1. Patient will be independent with initial HEP  2. Patient will report improved (L) ankle symptoms 0-5/10 for improved tolerance to ADLs and weightbearing activity  3. Patient will demonstrate improved (L) ankle AROM as follows: DF 0 deg, PF 60 deg, INV 30 deg, and EVER 10 deg for improved joint mobility  4. Patient will ambulate short community distances with walking boot (without knee scooter) with minimal compensation for improved tolerance to standing and walking    LTGs: to be met in 8 weeks  1. Patient will be independent with progressed HEP  2. Patient will report resolution of (L) ankle symptoms for improved ADL and functional mobility tolerance  3. Patient will demonstrate full, painfree AROM of (L) ankle all planes for improved joint mobility  4. Patient will successfully wean out of walking boot to ambulate on level and level community surfaces in regular shoe  5. Patient will have improved FAAM score >/= 60% for improved functional ability    Plan  Therapy options: will be seen for skilled therapy services  Planned modality interventions: cryotherapy and electrical stimulation/Russian stimulation  Planned therapy interventions: ADL retraining,  balance/weight-bearing training, fine motor coordination training, flexibility, functional ROM exercises, gait training, home exercise program, joint mobilization, manual therapy, neuromuscular re-education, soft tissue mobilization, strengthening, stretching and therapeutic activities  Frequency: 3x week  Duration in weeks: 8  Treatment plan discussed with: patient        Manual Therapy:         mins  02537;  Therapeutic Exercise:    17     mins  83945;     Neuromuscular Damaris:        mins  89593;    Therapeutic Activity:     10     mins  60232;     Gait Training:           mins  56358;     Ultrasound:          mins  42193;    Electrical Stimulation:         mins  34462 ( );  Dry Needling          mins self-pay    Timed Treatment:   27   mins   Total Treatment:     42   mins    PT SIGNATURE: Suzi Ochoa PT, DPT, OCS  Electronically signed by: Suzi Ochoa PT, 01/06/23, 2:14 PM EST  [place] License #[id number]     DATE TREATMENT INITIATED: 1/6/2023    Initial Certification  Certification Period: 1/6/2023 thru 4/5/2023  I certify that the therapy services are furnished while this patient is under my care.  The services outlined above are required by this patient, and will be reviewed every 90 days.     PHYSICIAN: Grant Martinez PA-C  [phone]                                          DATE:     Please sign and return via fax to [phone]. Thank you, [facility].

## 2023-01-06 NOTE — PROGRESS NOTES
A My-Chart message has been sent to the patient for PATIENT R[place]ING with [facility]

## 2023-01-20 ENCOUNTER — OFFICE VISIT (OUTPATIENT)
Dept: PSYCHIATRY | Facility: CLINIC | Age: 28
End: 2023-01-20
Payer: COMMERCIAL

## 2023-01-20 DIAGNOSIS — F33.1 MAJOR DEPRESSIVE DISORDER, RECURRENT EPISODE, MODERATE: Chronic | ICD-10-CM

## 2023-01-20 DIAGNOSIS — F90.0 ADHD, PREDOMINANTLY INATTENTIVE TYPE: Primary | Chronic | ICD-10-CM

## 2023-01-20 PROCEDURE — 90834 PSYTX W PT 45 MINUTES: CPT | Performed by: SOCIAL WORKER

## 2023-01-20 NOTE — PROGRESS NOTES
"Date: February 1, 2023  Time In: 0803  Time Out: 0855      PROGRESS NOTE  Data:  Anayeli Burdick is a 28 y.o. female who presents today for individual therapy session at [facility] with ELISHA Rosenberg, JACQUELINE.     Patient chief complaint: ADHD, depression and PTSD  Clinical Maneuvering/Intervention: Anayeli is pleasant alert and oriented to person place and time.  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  She feels like she is already shut down and not able to study and is fearful of taking the exam.  She talked about how her ADHD is a barrier and discovered that her self talk is a barrier as she thinks about herself as \"a failure\", and \"not going to any good\".  She said this is solidified when her  tells her similar things about herself.    Assisted patient in processing above session content; acknowledged and normalized patient’s thoughts, feelings, and concerns. Rationalized patient thought process regarding negative self talk. Discussed triggers associated with patient's ADHD and depression. Also discussed coping skills for patient to implement such as making a list of her positive attributes and reading those daily, especially before she studies for the nursing exam.  When studying, giving herself a place that has few distractions and studying in small chunks of time..    Allowed patient to freely discuss issues without interruption or judgment. Provided safe, confidential environment to facilitate the development of positive therapeutic relationship and encourage open, honest communication. Assisted patient in identifying risk factors which would indicate the need for higher level of care including thoughts to harm self or others and/or self-harming behavior and encouraged patient to contact this office, call 911, or present to the nearest emergency room should any of these events occur. Discussed crisis intervention services and means to access. Patient " adamantly and convincingly denies current suicidal or homicidal ideation or perceptual disturbance.    Assessment   Patient appears to maintain relative stability as compared to their baseline. However, patient continues to struggle with ADHD and depression which continues to cause impairment in important areas of functioning. A result, they can be reasonably expected to continue to benefit from treatment and would likely be at increased risk for decompensation otherwise.    Mental Status Exam:   Hygiene:   good  Cooperation:  Cooperative  Eye Contact:  Good  Psychomotor Behavior:  Appropriate  Affect:  Appropriate  Mood: depressed  Speech:  Normal  Thought Process:  Goal directed and Linear  Thought Content:  Normal  Suicidal:  None  Homicidal:  None  Hallucinations:  None  Delusion:  None  Memory:  Intact  Orientation:  Person, Place, Time and Situation  Reliability:  good  Insight:  Good  Judgement:  Good  Impulse Control:  Fair  Physical/Medical Issues:  See diagnosis list     PHQ-Score Total:  PHQ-9 Total Score: PHQ-9 Depression Screening  Little interest or pleasure in doing things? 3-->nearly every day   Feeling down, depressed, or hopeless? 2-->more than half the days   Trouble falling or staying asleep, or sleeping too much? 3-->nearly every day   Feeling tired or having little energy? 3-->nearly every day   Poor appetite or overeating? 3-->nearly every day   Feeling bad about yourself - or that you are a failure or have let yourself or your family down? 3-->nearly every day   Trouble concentrating on things, such as reading the newspaper or watching television? 3-->nearly every day   Moving or speaking so slowly that other people could have noticed? Or the opposite - being so fidgety or restless that you have been moving around a lot more than usual? 0-->not at all   Thoughts that you would be better off dead, or of hurting yourself in some way? 0-->not at all   PHQ-9 Total Score 20   If you checked off  any problems, how difficult have these problems made it for you to do your work, take care of things at home, or get along with other people? extremely difficult         ADA-7 Total Score:   Over the last two weeks, how often have you been bothered by the following problems?  Feeling nervous, anxious or on edge: Nearly every day  Not being able to stop or control worrying: Nearly every day  Worrying too much about different things: Nearly every day  Trouble Relaxing: Nearly every day  Being so restless that it is hard to sit still: Nearly every day  Becoming easily annoyed or irritable: More than half the days  Feeling afraid as if something awful might happen: Nearly every day  ADA 7 Total Score: 20  If you checked any problems, how difficult have these problems made it for you to do your work, take care of things at home, or get along with other people: Extremely difficult        Patient's Support Network Includes:  mother    Functional Status: Moderate impairment     Progress toward goal: Not at goal    Prognosis: Good with Ongoing Treatment          Plan     Resources: Patient was provided with the following community resources: none at this time    Patient will continue in individual outpatient therapy with focus on improved functioning and coping skills, maintaining stability, and avoiding decompensation and the need for higher level of care.    Patient will adhere to any medication regimens as prescribed and report any side effects. Patient will contact this office, call 911 or present to the nearest emergency room should suicidal or homicidal ideations occur. Provide cognitive behavioral therapy and solution focused therapy to improve functioning, maintain stability and avoid decompensation and the need for higher level of care.     Return in about 2 weeks, or earlier if symptoms worsen or fail to improve.           VISIT DIAGNOSIS:     ICD-10-CM ICD-9-CM   1. ADHD, predominantly inattentive type  F90.0  314.00   2. Major depressive disorder, recurrent episode, moderate (HCC)  F33.1 296.32            This document has been electronically signed by ELISHA Rosenberg, BUSTERW   February 1, 2023 09:50 EST      Part of this note may be an electronic transcription/translation of spoken language to printed text using the Dragon Dictation System.

## 2023-01-31 ENCOUNTER — TRANSCRIBE ORDERS (OUTPATIENT)
Dept: ADMINISTRATIVE | Facility: HOSPITAL | Age: 28
End: 2023-01-31
Payer: COMMERCIAL

## 2023-01-31 DIAGNOSIS — T14.8XXA FRACTURE: Primary | ICD-10-CM

## 2023-02-01 ENCOUNTER — TELEPHONE (OUTPATIENT)
Dept: PSYCHIATRY | Facility: CLINIC | Age: 28
End: 2023-02-01
Payer: COMMERCIAL

## 2023-02-01 DIAGNOSIS — F90.0 ADHD, PREDOMINANTLY INATTENTIVE TYPE: Primary | ICD-10-CM

## 2023-02-01 NOTE — TELEPHONE ENCOUNTER
Pt says she is vomitting on Qelbree and would like to go back on Adderall.  Do you want me to ask her to call her pharmacy to see if in stock and what dose will she be taking?

## 2023-02-02 RX ORDER — DEXTROAMPHETAMINE SACCHARATE, AMPHETAMINE ASPARTATE, DEXTROAMPHETAMINE SULFATE AND AMPHETAMINE SULFATE 1.25; 1.25; 1.25; 1.25 MG/1; MG/1; MG/1; MG/1
5 TABLET ORAL 2 TIMES DAILY
Qty: 60 TABLET | Refills: 0 | Status: SHIPPED | OUTPATIENT
Start: 2023-02-02 | End: 2023-03-03 | Stop reason: SDUPTHER

## 2023-02-08 ENCOUNTER — PRIOR AUTHORIZATION (OUTPATIENT)
Dept: PSYCHIATRY | Facility: CLINIC | Age: 28
End: 2023-02-08
Payer: COMMERCIAL

## 2023-02-08 DIAGNOSIS — F33.1 MAJOR DEPRESSIVE DISORDER, RECURRENT EPISODE, MODERATE: Chronic | ICD-10-CM

## 2023-02-08 NOTE — TELEPHONE ENCOUNTER
Rx Refill Note  Requested Prescriptions     Pending Prescriptions Disp Refills   • Vortioxetine HBr (Trintellix) 10 MG tablet tablet 28 tablet 0     Sig: Take 1 tablet by mouth Daily With Breakfast.      Last office visit with prescribing clinician: 1/3/2023   Last telemedicine visit with prescribing clinician: 3/3/2023   Next office visit with prescribing clinician: 3/3/2023   Office Visit with Rae Chatterjee MD (01/03/2023)                        Would you like a call back once the refill request has been completed: [] Yes [] No    If the office needs to give you a call back, can they leave a voicemail: [] Yes [] No    Gracia Celeste MA  02/08/23, 08:48 EST

## 2023-02-10 ENCOUNTER — PRIOR AUTHORIZATION (OUTPATIENT)
Dept: PSYCHIATRY | Facility: CLINIC | Age: 28
End: 2023-02-10
Payer: COMMERCIAL

## 2023-02-13 ENCOUNTER — TELEPHONE (OUTPATIENT)
Dept: PSYCHIATRY | Facility: CLINIC | Age: 28
End: 2023-02-13
Payer: COMMERCIAL

## 2023-02-13 DIAGNOSIS — F33.1 MAJOR DEPRESSIVE DISORDER, RECURRENT EPISODE, MODERATE: Chronic | ICD-10-CM

## 2023-02-21 ENCOUNTER — OFFICE VISIT (OUTPATIENT)
Dept: SPORTS MEDICINE | Facility: CLINIC | Age: 28
End: 2023-02-21
Payer: OTHER MISCELLANEOUS

## 2023-02-21 VITALS
BODY MASS INDEX: 39.87 KG/M2 | OXYGEN SATURATION: 97 % | DIASTOLIC BLOOD PRESSURE: 76 MMHG | SYSTOLIC BLOOD PRESSURE: 118 MMHG | HEIGHT: 63 IN | HEART RATE: 88 BPM | WEIGHT: 225 LBS | TEMPERATURE: 98.2 F

## 2023-02-21 DIAGNOSIS — M25.572 ACUTE LEFT ANKLE PAIN: Primary | ICD-10-CM

## 2023-02-21 DIAGNOSIS — G90.522 COMPLEX REGIONAL PAIN SYNDROME TYPE 1 OF LEFT LOWER EXTREMITY: ICD-10-CM

## 2023-02-21 DIAGNOSIS — M79.672 LEFT FOOT PAIN: ICD-10-CM

## 2023-02-21 PROCEDURE — 99245 OFF/OP CONSLTJ NEW/EST HI 55: CPT | Performed by: STUDENT IN AN ORGANIZED HEALTH CARE EDUCATION/TRAINING PROGRAM

## 2023-02-21 RX ORDER — DICLOFENAC SODIUM 75 MG/1
1 TABLET, DELAYED RELEASE ORAL EVERY 12 HOURS SCHEDULED
COMMUNITY
Start: 2023-02-13

## 2023-02-21 RX ORDER — LIDOCAINE 50 MG/G
1 OINTMENT TOPICAL 3 TIMES DAILY PRN
Qty: 35.44 G | Refills: 0 | Status: SHIPPED | OUTPATIENT
Start: 2023-02-21
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
## 2023-02-23 ENCOUNTER — PATIENT ROUNDING (BHMG ONLY) (OUTPATIENT)
Dept: SPORTS MEDICINE | Facility: CLINIC | Age: 28
End: 2023-02-23
Payer: COMMERCIAL

## 2023-02-23 NOTE — PROGRESS NOTES
February 23, 2023    A [employer] Message has been sent to the patient for PATIENT ROUNDING with [facility]

## 2023-02-28 ENCOUNTER — TREATMENT (OUTPATIENT)
Dept: PHYSICAL THERAPY | Facility: CLINIC | Age: 28
End: 2023-02-28
Payer: OTHER MISCELLANEOUS

## 2023-02-28 DIAGNOSIS — M25.572 ACUTE LEFT ANKLE PAIN: Primary | ICD-10-CM

## 2023-02-28 DIAGNOSIS — M79.672 ACUTE PAIN OF LEFT FOOT: ICD-10-CM

## 2023-02-28 PROCEDURE — 97110 THERAPEUTIC EXERCISES: CPT | Performed by: PHYSICAL THERAPIST

## 2023-02-28 PROCEDURE — 97161 PT EVAL LOW COMPLEX 20 MIN: CPT | Performed by: PHYSICAL THERAPIST

## 2023-02-28 NOTE — PROGRESS NOTES
"    Physical Therapy Initial Evaluation and Plan of Care  [address] [place] [address], [address]  [place] [address]    Patient: Anayeli Burdick   : 1995  Diagnosis/ICD-10 Code:  Acute left ankle pain [M25.572]  Referring practitioner: Hoda Rich DO  Date of Initial Visit: 2023  Today's Date: 2023  Patient seen for 1 session         Visit Diagnoses:    ICD-10-CM ICD-9-CM   1. Acute left ankle pain  M25.572 719.47   2. Acute pain of left foot  M79.672 729.5         Subjective Questionnaire: FAAM 17      Subjective Evaluation    History of Present Illness  Date of onset: 2022  Mechanism of injury: Patient injured her left foot and ankle in late December.  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  States that she got up immediately and continued working.  Was seen in [facility], then by Dr. Rich.  Reports being in the boot since December.  Also reports using a scooter since December also.          Patient Occupation: [employer]  Current pain rating: 10 (states that it is \"unbearable\")  Location: left lateral ankle to the top of the foot  Quality: knife-like, needle-like and throbbing (numbness)  Alleviating factors: nothing.  Exacerbated by: elevating the foot for long periods, ice, heat.  Progression: no change             Objective          Observations     Additional Ankle/Foot Observation Details  Patient is in no acute distress despite a 10/10 pain rating.  Patient presents with a short  CAM boot on the left foot and using a scooter so she is NWB on the left LE.    Palpation     Additional Palpation Details  Patient is very TTP to the left lateral ankle, 3-5 MT and AITFL.    Active Range of Motion   Left Ankle/Foot   Plantar flexion: 52 degrees   Inversion: 30 degrees     Additional Active Range of Motion Details  AROM Left Ankle Eversion to neutral, DF -38    Strength/Myotome Testing     Left Ankle/Foot   Inversion: 3+  Eversion: 3-    Tests     Additional Tests " Details  + Inversion Stress Test  + Eversion Stress Test          Assessment & Plan     Assessment  Impairments: abnormal gait, abnormal or restricted ROM, activity intolerance, impaired balance, impaired physical strength, lacks appropriate home exercise program and pain with function    Assessment details: Patient presents with c/o pain, TTP, limited AROM, decreased strength, positive special testing and an antalgic gait pattern which is limiting her ability to perform full job duties and ADL'S.    Barriers to therapy: none  Prognosis: good  Prognosis details: STG's to be met by 2-4 weeks  1)  Independent with HEP  2)  Decrease pain by 50% or more  3)  AROM WNL for the left ankle  4)  Min to no TTP present    LTG's to be met by 8 weeks  1)  Independent with HEP progression  2)  Decrease pain by 75% or more  3)  Increase strength for the left ankle to 4/5   4)  Negative special testing  5)  No TTP present  6)  Patient to ambulate with a minimal antalgic gait pattern with the CAM boot      Plan  Therapy options: will be seen for skilled therapy services  Planned therapy interventions: strengthening, stretching, therapeutic activities, home exercise program, gait training and neuromuscular re-education  Frequency: 2x week  Duration in weeks: 8            Timed:         Manual Therapy:    0     mins  16170;     Therapeutic Exercise:    14     mins  42928;  Neuromuscular Damaris:    0    mins  20820;    Therapeutic Activity:     0     mins  42973;     Gait Trainin     mins  05264;     Ultrasound:     0     mins  68302;          Un-Timed:  Electrical Stimulation:    0     mins  17186 ( );    Low Eval     12     Mins  99168  Mod Eval     0     Mins  75543  High Eval                       0     Mins  64375        Timed Treatment:   14   mins   Total Treatment:     26   mins          PT: Alexandre Trevino, PT     [place] License [id number]  Electronically signed by Alexandre Trevino, PT, 23, 2:26 PM  EST    Certification Period: 2/28/2023 thru 5/28/2023  I certify that the therapy services are furnished while this patient is under my care.  The services outlined above are required by this patient, and will be reviewed every 90 days.    Hoda Rich Do  [address] [id number]   NPI: [id number]      Alexandre Trevino, PT   License number: [id number]        Physician Signature:__________________________________________________    PHYSICIAN: Hoda Rich DO      DATE:     Please sign and return via fax to [email or web address] . Thank you, [facility].

## 2023-03-01 DIAGNOSIS — G90.522 COMPLEX REGIONAL PAIN SYNDROME TYPE 1 OF LEFT LOWER EXTREMITY: ICD-10-CM

## 2023-03-01 DIAGNOSIS — M79.672 LEFT FOOT PAIN: ICD-10-CM

## 2023-03-01 DIAGNOSIS — M25.572 ACUTE LEFT ANKLE PAIN: Primary | ICD-10-CM

## 2023-03-02 ENCOUNTER — TELEPHONE (OUTPATIENT)
Dept: PHYSICAL THERAPY | Facility: CLINIC | Age: 28
End: 2023-03-02
Payer: COMMERCIAL

## 2023-03-02 NOTE — TELEPHONE ENCOUNTER
SENT MESSAGE TO PATIENT TO SEE IF SHE WOULD LIKE TO COME IN TODAY AT 1:00 SINCE WE HAD A CANCELLATION, WAITING TO HEAR BACK FROM PATIENT

## 2023-03-03 ENCOUNTER — OFFICE VISIT (OUTPATIENT)
Dept: PSYCHIATRY | Facility: CLINIC | Age: 28
End: 2023-03-03
Payer: COMMERCIAL

## 2023-03-03 DIAGNOSIS — F43.10 PTSD (POST-TRAUMATIC STRESS DISORDER): ICD-10-CM

## 2023-03-03 DIAGNOSIS — Z79.899 ENCOUNTER FOR LONG-TERM (CURRENT) USE OF OTHER MEDICATIONS: ICD-10-CM

## 2023-03-03 DIAGNOSIS — F90.0 ADHD, PREDOMINANTLY INATTENTIVE TYPE: Chronic | ICD-10-CM

## 2023-03-03 DIAGNOSIS — F33.1 MAJOR DEPRESSIVE DISORDER, RECURRENT EPISODE, MODERATE: Primary | Chronic | ICD-10-CM

## 2023-03-03 PROCEDURE — 99214 OFFICE O/P EST MOD 30 MIN: CPT | Performed by: PSYCHIATRY & NEUROLOGY

## 2023-03-03 RX ORDER — DEXTROAMPHETAMINE SACCHARATE, AMPHETAMINE ASPARTATE, DEXTROAMPHETAMINE SULFATE AND AMPHETAMINE SULFATE 1.25; 1.25; 1.25; 1.25 MG/1; MG/1; MG/1; MG/1
5 TABLET ORAL 2 TIMES DAILY
Qty: 60 TABLET | Refills: 0 | Status: SHIPPED | OUTPATIENT
Start: 2023-03-03 | End: 2023-03-14 | Stop reason: RX
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
## 2023-03-06 ENCOUNTER — TELEPHONE (OUTPATIENT)
Dept: PHYSICAL THERAPY | Facility: CLINIC | Age: 28
End: 2023-03-06
Payer: COMMERCIAL

## 2023-03-06 NOTE — TELEPHONE ENCOUNTER
CALLED PATIENT TO SEE IF SHE WOULD LIKE TO COME IN TODAY AT 11:00 DUE TO A CANCELLATION AND SHE SAID SHE JUST GOT OFF WORK AND IS GOING TO BED

## 2023-03-09 ENCOUNTER — TREATMENT (OUTPATIENT)
Dept: PHYSICAL THERAPY | Facility: CLINIC | Age: 28
End: 2023-03-09
Payer: OTHER MISCELLANEOUS

## 2023-03-09 ENCOUNTER — TELEPHONE (OUTPATIENT)
Dept: PHYSICAL THERAPY | Facility: CLINIC | Age: 28
End: 2023-03-09
Payer: COMMERCIAL

## 2023-03-09 DIAGNOSIS — M79.672 ACUTE PAIN OF LEFT FOOT: ICD-10-CM

## 2023-03-09 DIAGNOSIS — M25.572 ACUTE LEFT ANKLE PAIN: Primary | ICD-10-CM

## 2023-03-09 DIAGNOSIS — S93.402D SPRAIN OF LEFT ANKLE, UNSPECIFIED LIGAMENT, SUBSEQUENT ENCOUNTER: ICD-10-CM

## 2023-03-09 DIAGNOSIS — R26.2 DIFFICULTY WALKING: ICD-10-CM

## 2023-03-09 PROCEDURE — 97110 THERAPEUTIC EXERCISES: CPT | Performed by: PHYSICAL THERAPIST

## 2023-03-09 NOTE — TELEPHONE ENCOUNTER
PATIENT DID NOT COME TO APPOINTMENT TODAY. CALLED L/M LETTING HER KNOW SHE MISSED AND GAVE NEXT APPOINTMENT DATE AND TIME

## 2023-03-09 NOTE — PROGRESS NOTES
"Physical Therapy Daily Treatment Note  [address], [address]  [place] [address]  Visit # 2      Subjective Evaluation    History of Present Illness    Subjective comment: Pt reports a current pain level of 10/10 \"I feel like crying\".         Objective   See Exercise, Manual, and Modality Logs for complete treatment.   Added skate board, and rocker board    Assessment & Plan     Assessment    Assessment details: Pt completed treatment with visible signs of pain from all movements.  Pt was able to tolerate addition of skate board and rocker board for improved ROM.  No further progressions due to pt reported high level of pain.                     Manual Therapy:    0     mins  95279;  Therapeutic Exercise:    23     mins  79299;     Neuromuscular Damaris:    0    mins  58544;    Therapeutic Activity:     0     mins  00015;     Gait Trainin     mins  20452;     Ultrasound:     0     mins  27563;    Work Hardening           0      mins 46640  Iontophoresis               0   mins 78838  E-Stim                          _0_ mins 43258 ( )    Timed Treatment:   23   mins   Total Treatment:     23   mins    Jong Shahid PTA  Physical Therapist Assistant  "

## 2023-03-13 ENCOUNTER — TELEPHONE (OUTPATIENT)
Dept: PSYCHIATRY | Facility: CLINIC | Age: 28
End: 2023-03-13
Payer: COMMERCIAL

## 2023-03-13 DIAGNOSIS — F90.0 ADHD, PREDOMINANTLY INATTENTIVE TYPE: Primary | ICD-10-CM

## 2023-03-13 NOTE — TELEPHONE ENCOUNTER
Pt says [facility] doesn't know when they will get Adderall 5 mg in stock, but they have 10 mg.    Inspect printed; last fill 2-07

## 2023-03-14 ENCOUNTER — TREATMENT (OUTPATIENT)
Dept: PHYSICAL THERAPY | Facility: CLINIC | Age: 28
End: 2023-03-14
Payer: OTHER MISCELLANEOUS

## 2023-03-14 DIAGNOSIS — M79.672 ACUTE PAIN OF LEFT FOOT: ICD-10-CM

## 2023-03-14 DIAGNOSIS — M25.572 ACUTE LEFT ANKLE PAIN: Primary | ICD-10-CM

## 2023-03-14 DIAGNOSIS — S93.402D SPRAIN OF LEFT ANKLE, UNSPECIFIED LIGAMENT, SUBSEQUENT ENCOUNTER: ICD-10-CM

## 2023-03-14 PROCEDURE — 97110 THERAPEUTIC EXERCISES: CPT | Performed by: PHYSICAL THERAPIST

## 2023-03-14 RX ORDER — DEXTROAMPHETAMINE SACCHARATE, AMPHETAMINE ASPARTATE, DEXTROAMPHETAMINE SULFATE AND AMPHETAMINE SULFATE 2.5; 2.5; 2.5; 2.5 MG/1; MG/1; MG/1; MG/1
5 TABLET ORAL 2 TIMES DAILY
Qty: 30 TABLET | Refills: 0 | Status: SHIPPED | OUTPATIENT
Start: 2023-03-14 | End: 2024-03-13

## 2023-03-14 NOTE — PROGRESS NOTES
Physical Therapy Daily Treatment Note  [address], [address]  [place] [address]      Patient: Anayeli Burdick   : 1995  Referring practitioner: Hoda Rich DO  Date of Initial Visit: Type: THERAPY  Noted: 2023  Today's Date: 3/14/2023  Patient seen for 3 sessions       Visit Diagnoses:    ICD-10-CM ICD-9-CM   1. Acute left ankle pain  M25.572 719.47   2. Acute pain of left foot  M79.672 729.5   3. Sprain of left ankle, unspecified ligament, subsequent encounter  S93.402D V58.89     845.00           Subjective   Patient reports that that ankle is not good.  Rates the pain at 9/10.  Reports that the ankle has not gotten any better since the injury and feels like it has gotten worse.    Objective          Observations     Additional Ankle/Foot Observation Details  Patient ambulates with a moderate antalgic gait pattern with the CAM boot on.      See Exercise, Manual, and Modality Logs for complete treatment.       Assessment/Plan  Patient is in no acute distress despite a high pain rating.  Added ankle alphabet and seated HR/TR to the routine, in addition to progressing the previous exercises.  Patient performed the routine without visual or verbal signs of pain in the ankle.        Timed:         Manual Therapy:    0     mins  21034;     Therapeutic Exercise:    24     mins  81179;     Neuromuscular Damaris:    0    mins  59603;    Therapeutic Activity:     0     mins  31776;     Gait Training      0    mins  11997;  Work Conditioning     0   mins  51441       Untimed:  Electrical Stimulation:    0     mins  36806 ( );      Timed Treatment:   24   mins   Total Treatment:     24   mins    Alexandre Trevino, PT  [place] License: [id number]

## 2023-03-14 NOTE — TELEPHONE ENCOUNTER
Pt informed rx sent and Andres at [facility] pharmacy says no need to cancel script, since Dr. Chatterjee uses the same system.

## 2023-03-15 ENCOUNTER — TREATMENT (OUTPATIENT)
Dept: PHYSICAL THERAPY | Facility: CLINIC | Age: 28
End: 2023-03-15
Payer: OTHER MISCELLANEOUS

## 2023-03-15 DIAGNOSIS — M25.572 ACUTE LEFT ANKLE PAIN: Primary | ICD-10-CM

## 2023-03-15 DIAGNOSIS — M79.672 ACUTE PAIN OF LEFT FOOT: ICD-10-CM

## 2023-03-15 DIAGNOSIS — S93.402D SPRAIN OF LEFT ANKLE, UNSPECIFIED LIGAMENT, SUBSEQUENT ENCOUNTER: ICD-10-CM

## 2023-03-15 PROCEDURE — 97110 THERAPEUTIC EXERCISES: CPT | Performed by: PHYSICAL THERAPIST

## 2023-03-15 NOTE — PROGRESS NOTES
"Physical Therapy Daily Treatment Note  [address], [address]  [place] [address]  Visit # 4      Subjective Evaluation    History of Present Illness    Subjective comment: Pt reports that her (L) ankle is \"better than yesterday\".  She had swelling after last treatment.         Objective   See Exercise, Manual, and Modality Logs for complete treatment.       Assessment & Plan     Assessment    Assessment details: Pt completed treament with minimal c/o pain in (L) ankle.  She tolerated the addition of hip and quad strengthening exercises with no issues.  Continue to progress as tolerated.                     Manual Therapy:    0     mins  94031;  Therapeutic Exercise:    31     mins  40939;     Neuromuscular Damaris:    0    mins  48780;    Therapeutic Activity:     0     mins  86926;     Gait Trainin     mins  69138;     Ultrasound:     0     mins  84164;    Work Hardening           0      mins 37044  Iontophoresis               0   mins 19401  E-Stim                          _0_ mins 37050 ( )    Timed Treatment:   31   mins   Total Treatment:     31   mins    Jong Shahid PTA  Physical Therapist Assistant  "

## 2023-03-20 ENCOUNTER — TREATMENT (OUTPATIENT)
Dept: PHYSICAL THERAPY | Facility: CLINIC | Age: 28
End: 2023-03-20
Payer: OTHER MISCELLANEOUS

## 2023-03-20 DIAGNOSIS — R26.2 DIFFICULTY WALKING: ICD-10-CM

## 2023-03-20 DIAGNOSIS — M79.672 ACUTE PAIN OF LEFT FOOT: ICD-10-CM

## 2023-03-20 DIAGNOSIS — M25.572 ACUTE LEFT ANKLE PAIN: Primary | ICD-10-CM

## 2023-03-20 DIAGNOSIS — S93.402D SPRAIN OF LEFT ANKLE, UNSPECIFIED LIGAMENT, SUBSEQUENT ENCOUNTER: ICD-10-CM

## 2023-03-20 PROCEDURE — 97110 THERAPEUTIC EXERCISES: CPT | Performed by: PHYSICAL THERAPIST

## 2023-03-20 NOTE — PROGRESS NOTES
Physical Therapy Daily Treatment Note  [address], [address]  [place] [address]  Visit # 5      Subjective Evaluation    History of Present Illness    Subjective comment: Pt reports that her (L) foot became black and cold on Thursday.  She was able to restore the color by moving her ankle through her AROM        Objective   See Exercise, Manual, and Modality Logs for complete treatment.       Assessment & Plan     Assessment    Assessment details: Volume of pt's ankle exercises were curtailed today due to subjective reports.  Pt was able to tolerate treatment with no c/o increased pain .    Pt will see Dr. Rich on 3/21/23.                     Manual Therapy:    0       mins  51467;  Therapeutic Exercise:    27     mins  73829;     Neuromuscular Damaris:    0    mins  85520;    Therapeutic Activity:     0     mins  37912;     Gait Trainin     mins  16180;     Ultrasound:     0     mins  07455;    Work Hardening           0      mins 45132  Iontophoresis               0   mins 92765  E-Stim                          _0_ mins 52700 ( )    Timed Treatment:   27   mins   Total Treatment:     27   mins    Jong Shahid PTA  Physical Therapist Assistant

## 2023-03-21 ENCOUNTER — OFFICE VISIT (OUTPATIENT)
Dept: SPORTS MEDICINE | Facility: CLINIC | Age: 28
End: 2023-03-21
Payer: OTHER MISCELLANEOUS

## 2023-03-21 VITALS — BODY MASS INDEX: 25.95 KG/M2 | TEMPERATURE: 98.4 F | HEIGHT: 64 IN | WEIGHT: 152 LBS

## 2023-03-21 DIAGNOSIS — M25.572 ACUTE LEFT ANKLE PAIN: ICD-10-CM

## 2023-03-21 DIAGNOSIS — M79.672 LEFT FOOT PAIN: ICD-10-CM

## 2023-03-21 DIAGNOSIS — G90.522 COMPLEX REGIONAL PAIN SYNDROME TYPE 1 OF LEFT LOWER EXTREMITY: Primary | ICD-10-CM

## 2023-03-21 PROCEDURE — 99214 OFFICE O/P EST MOD 30 MIN: CPT | Performed by: STUDENT IN AN ORGANIZED HEALTH CARE EDUCATION/TRAINING PROGRAM
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
## 2023-03-22 ENCOUNTER — TELEPHONE (OUTPATIENT)
Dept: PHYSICAL THERAPY | Facility: CLINIC | Age: 28
End: 2023-03-22
Payer: COMMERCIAL

## 2023-03-22 ENCOUNTER — TREATMENT (OUTPATIENT)
Dept: PHYSICAL THERAPY | Facility: CLINIC | Age: 28
End: 2023-03-22
Payer: OTHER MISCELLANEOUS

## 2023-03-22 DIAGNOSIS — M79.672 ACUTE PAIN OF LEFT FOOT: ICD-10-CM

## 2023-03-22 DIAGNOSIS — S93.402D SPRAIN OF LEFT ANKLE, UNSPECIFIED LIGAMENT, SUBSEQUENT ENCOUNTER: ICD-10-CM

## 2023-03-22 DIAGNOSIS — M25.572 ACUTE LEFT ANKLE PAIN: Primary | ICD-10-CM

## 2023-03-22 DIAGNOSIS — R26.2 DIFFICULTY WALKING: ICD-10-CM

## 2023-03-22 PROCEDURE — 97110 THERAPEUTIC EXERCISES: CPT | Performed by: PHYSICAL THERAPIST

## 2023-03-22 PROCEDURE — 97112 NEUROMUSCULAR REEDUCATION: CPT | Performed by: PHYSICAL THERAPIST

## 2023-03-22 NOTE — TELEPHONE ENCOUNTER
Caller: Anayeli Burdick    Relationship: Self       What was the call regarding: WOULD LIKE TO COME IN SOONER TODAY, I SAW A 2PM ON [facility] BUT SINCE UNDER 4 HOURS I NEEDED YOUR APPROVAL TO SCHEDULE, GOT NO ANSWER WHEN I CALLED. CAN YOU PLEASE CALL AND LET HER KNOW IF SHE CAN COME AT 2PM TODAY    Do you require a callback: YES

## 2023-03-22 NOTE — PROGRESS NOTES
Physical Therapy Daily Treatment Note  [address], [address]  [place] [address]  Visit # 6      Subjective Evaluation    History of Present Illness    Subjective comment: Pt reports a current pain level of 8/10 in (L) ankle.       Objective   See Exercise, Manual, and Modality Logs for complete treatment.   Added staggered wt shifts (B)/ Lateral wt shift, and standing heel/Toe    Assessment & Plan     Assessment    Assessment details: Pt tolerated treatment without substantial c/o pain.  Pt had no visible signs of pain during treatment. She was progressed to closed chain standing exercises for greater weight bearing tolerance.  Continue to progress as tolerated.                     Manual Therapy:    0     mins  78329;  Therapeutic Exercise:    32     mins  26800;     Neuromuscular Damaris:    8    mins  44854;    Therapeutic Activity:     0     mins  33481;     Gait Trainin     mins  59032;     Ultrasound:     0     mins  46470;    Work Hardening           0      mins 86096  Iontophoresis               0   mins 22337  E-Stim                          _0_ mins 15644 ( )    Timed Treatment:   40   mins   Total Treatment:     40   mins    Jong Shahid PTA  Physical Therapist Assistant

## 2023-03-29 ENCOUNTER — TREATMENT (OUTPATIENT)
Dept: PHYSICAL THERAPY | Facility: CLINIC | Age: 28
End: 2023-03-29
Payer: OTHER MISCELLANEOUS

## 2023-03-29 ENCOUNTER — DOCUMENTATION (OUTPATIENT)
Dept: PHYSICAL THERAPY | Facility: CLINIC | Age: 28
End: 2023-03-29
Payer: COMMERCIAL

## 2023-03-29 DIAGNOSIS — M25.572 ACUTE LEFT ANKLE PAIN: Primary | ICD-10-CM

## 2023-03-29 DIAGNOSIS — M79.672 ACUTE PAIN OF LEFT FOOT: ICD-10-CM

## 2023-03-29 PROCEDURE — 97110 THERAPEUTIC EXERCISES: CPT | Performed by: PHYSICAL THERAPIST

## 2023-03-29 PROCEDURE — 97530 THERAPEUTIC ACTIVITIES: CPT | Performed by: PHYSICAL THERAPIST

## 2023-03-29 NOTE — PROGRESS NOTES
Physical Therapy Daily Treatment Note  [address], [address]  [place] [address]      Patient: Anayeli Burdick   : 1995  Referring practitioner: Hoda Rich DO  Date of Initial Visit: Type: THERAPY  Noted: 2023  Today's Date: 3/29/2023  Patient seen for 7 sessions       Visit Diagnoses:    ICD-10-CM ICD-9-CM   1. Acute left ankle pain  M25.572 719.47   2. Acute pain of left foot  M79.672 729.5           Subjective   Patient reports that the foot and ankle are the same, rates the pain at 8/10.  Reports being referred out to pain management.    Objective   See Exercise, Manual, and Modality Logs for complete treatment.       Assessment/Plan  Patient is in no acute distress, despite a high pain rating.  Patient was instructed to continue with the HEP until she sees pain management.  Patient can ambulate in the clinic without the boot, but has a significant antalgic gait pattern.  D/C to HEP at this time.      Timed:         Manual Therapy:    0     mins  73216;     Therapeutic Exercise:    12     mins  06281;     Neuromuscular Damaris:    0    mins  54124;    Therapeutic Activity:     8     mins  00187;     Gait Training      0    mins  29658;  Work Conditioning     0   mins  34301       Untimed:  Electrical Stimulation:    0     mins  75319 ( );      Timed Treatment:   20   mins   Total Treatment:     20   mins    Alexandre Trevino, PT  [place] License: [id number]

## 2023-04-04 ENCOUNTER — TELEPHONE (OUTPATIENT)
Dept: SPORTS MEDICINE | Facility: CLINIC | Age: 28
End: 2023-04-04
Payer: COMMERCIAL

## 2023-04-04 NOTE — TELEPHONE ENCOUNTER
Brannon with [facility] called 4/3/23 and spoke to Sharri at the [facility]. Brannon was very confused because she thought had spoke with the office staff. Brannon called yesterday to tell the office about the patients' foot turning black and that she needs an appointment as soon as possible. There is no note in the chart that she spoke to anyone yesterday about her concerns and there was no calls made to the office about this patient. Brannon called today and Silvana from the [facility] called our office and transferred Brannon to our office. Brannon was very upset and irritated about how this was handled yesterday. I told her that Dr Rich was out this week and we would have to send the patient to the [facility] office to see a provider there since this is an urgent matter.

## 2023-04-12 ENCOUNTER — OFFICE VISIT (OUTPATIENT)
Dept: SPORTS MEDICINE | Facility: CLINIC | Age: 28
End: 2023-04-12
Payer: OTHER MISCELLANEOUS

## 2023-04-12 VITALS — BODY MASS INDEX: 25.95 KG/M2 | HEIGHT: 64 IN | WEIGHT: 152 LBS | TEMPERATURE: 98.1 F

## 2023-04-12 DIAGNOSIS — G90.522 COMPLEX REGIONAL PAIN SYNDROME TYPE 1 OF LEFT LOWER EXTREMITY: Primary | ICD-10-CM

## 2023-04-12 DIAGNOSIS — M25.572 ACUTE LEFT ANKLE PAIN: ICD-10-CM

## 2023-04-12 PROCEDURE — 99213 OFFICE O/P EST LOW 20 MIN: CPT | Performed by: STUDENT IN AN ORGANIZED HEALTH CARE EDUCATION/TRAINING PROGRAM

## 2023-04-12 NOTE — LETTER
April 12, 2023     Patient: Anayeli Burdick   YOB: 1995   Date of Visit: 4/12/2023       To Whom It May Concern:    It is my medical opinion that Anayeli Burdick may continue with light duty immediately with the following restrictions: may begin to increase her weightbearing activity (standing/walking) as tolerated. Please start with a lighter load and allow her to sit while performing her duties as able. She may slowly increase her activity over the next 2-4 weeks as tolerated. She will follow-up in roughly 4 weeks and updates recommendations will be made at that time.           Sincerely,        Hoda Rich DO    CC:   No Recipients
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
## 2023-04-13 DIAGNOSIS — F90.0 ADHD, PREDOMINANTLY INATTENTIVE TYPE: ICD-10-CM

## 2023-04-13 RX ORDER — DEXTROAMPHETAMINE SACCHARATE, AMPHETAMINE ASPARTATE, DEXTROAMPHETAMINE SULFATE AND AMPHETAMINE SULFATE 2.5; 2.5; 2.5; 2.5 MG/1; MG/1; MG/1; MG/1
5 TABLET ORAL 2 TIMES DAILY
Qty: 30 TABLET | Refills: 0 | Status: CANCELLED | OUTPATIENT
Start: 2023-04-13 | End: 2024-04-12

## 2023-04-14 DIAGNOSIS — F90.0 ADHD, PREDOMINANTLY INATTENTIVE TYPE: ICD-10-CM

## 2023-04-14 RX ORDER — DEXTROAMPHETAMINE SACCHARATE, AMPHETAMINE ASPARTATE, DEXTROAMPHETAMINE SULFATE AND AMPHETAMINE SULFATE 2.5; 2.5; 2.5; 2.5 MG/1; MG/1; MG/1; MG/1
5 TABLET ORAL 2 TIMES DAILY
Qty: 30 TABLET | Refills: 0 | Status: SHIPPED | OUTPATIENT
Start: 2023-04-14 | End: 2024-04-13

## 2023-04-14 NOTE — TELEPHONE ENCOUNTER
Rx Refill Note  Requested Prescriptions     Pending Prescriptions Disp Refills   • amphetamine-dextroamphetamine (Adderall) 10 MG tablet 30 tablet 0     Sig: Take 0.5 tablets by mouth 2 (Two) Times a Day.      Last office visit with prescribing clinician: 3/3/2023   Last telemedicine visit with prescribing clinician: 7/5/2023   Next office visit with prescribing clinician: 7/5/2023   Office Visit with Rae Chatterjee MD (03/03/2023)       [facility] Full Urine Drug Screen - (03/03/2023)                   Would you like a call back once the refill request has been completed: [] Yes [] No    If the office needs to give you a call back, can they leave a voicemail: [] Yes [] No    Gracia Celeste MA  04/14/23, 08:12 EDT

## 2023-04-24 ENCOUNTER — OFFICE VISIT (OUTPATIENT)
Dept: SPORTS MEDICINE | Facility: CLINIC | Age: 28
End: 2023-04-24
Payer: OTHER MISCELLANEOUS

## 2023-04-24 VITALS — BODY MASS INDEX: 25.95 KG/M2 | WEIGHT: 152 LBS | HEIGHT: 64 IN | TEMPERATURE: 97.6 F

## 2023-04-24 DIAGNOSIS — G90.522 COMPLEX REGIONAL PAIN SYNDROME TYPE 1 OF LEFT LOWER EXTREMITY: ICD-10-CM

## 2023-04-24 DIAGNOSIS — M25.572 ACUTE LEFT ANKLE PAIN: Primary | ICD-10-CM

## 2023-04-24 DIAGNOSIS — M79.672 LEFT FOOT PAIN: ICD-10-CM

## 2023-04-24 PROCEDURE — 99213 OFFICE O/P EST LOW 20 MIN: CPT | Performed by: STUDENT IN AN ORGANIZED HEALTH CARE EDUCATION/TRAINING PROGRAM

## 2023-04-24 RX ORDER — NITROFURANTOIN 25; 75 MG/1; MG/1
CAPSULE ORAL
COMMUNITY
Start: 2023-04-17

## 2023-04-24 NOTE — LETTER
April 24, 2023     Patient: Anayeli Burdick   YOB: 1995   Date of Visit: 4/24/2023       To Whom It May Concern:    It is my medical opinion that Anayeli Burdick may return to full duty immediately with no restrictions.           Sincerely,        Hoda Rich,     CC:   No Recipients
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
## 2023-05-15 RX ORDER — SEMAGLUTIDE 2.4 MG/.75ML
2.4 INJECTION, SOLUTION SUBCUTANEOUS WEEKLY
Qty: 3 ML | Refills: 4 | Status: SHIPPED | OUTPATIENT
Start: 2023-05-15

## 2023-05-18 DIAGNOSIS — F90.0 ADHD, PREDOMINANTLY INATTENTIVE TYPE: ICD-10-CM

## 2023-05-18 RX ORDER — DEXTROAMPHETAMINE SACCHARATE, AMPHETAMINE ASPARTATE, DEXTROAMPHETAMINE SULFATE AND AMPHETAMINE SULFATE 2.5; 2.5; 2.5; 2.5 MG/1; MG/1; MG/1; MG/1
5 TABLET ORAL 2 TIMES DAILY
Qty: 30 TABLET | Refills: 0 | Status: CANCELLED | OUTPATIENT
Start: 2023-05-18 | End: 2024-05-17

## 2023-05-19 DIAGNOSIS — F90.0 ADHD, PREDOMINANTLY INATTENTIVE TYPE: ICD-10-CM

## 2023-05-19 RX ORDER — DEXTROAMPHETAMINE SACCHARATE, AMPHETAMINE ASPARTATE, DEXTROAMPHETAMINE SULFATE AND AMPHETAMINE SULFATE 2.5; 2.5; 2.5; 2.5 MG/1; MG/1; MG/1; MG/1
5 TABLET ORAL 2 TIMES DAILY
Qty: 30 TABLET | Refills: 0 | Status: SHIPPED | OUTPATIENT
Start: 2023-05-19 | End: 2024-05-18

## 2023-05-19 NOTE — TELEPHONE ENCOUNTER
Rx Refill Note  Requested Prescriptions     Pending Prescriptions Disp Refills   • amphetamine-dextroamphetamine (Adderall) 10 MG tablet 30 tablet 0     Sig: Take 0.5 tablets by mouth 2 (Two) Times a Day.      Last office visit with prescribing clinician: 3/3/2023   Last telemedicine visit with prescribing clinician: 5/18/2023   Next office visit with prescribing clinician: 7/5/2023   Office Visit with Rae Chatterjee MD (03/03/2023)Patricia Full Urine Drug Screen - (03/03/2023)                          Would you like a call back once the refill request has been completed: [] Yes [] No    If the office needs to give you a call back, can they leave a voicemail: [] Yes [] No    Gracia Celeste MA  05/19/23, 09:06 EDT
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
## 2023-05-25 ENCOUNTER — OFFICE VISIT (OUTPATIENT)
Dept: PSYCHIATRY | Facility: CLINIC | Age: 28
End: 2023-05-25
Payer: COMMERCIAL

## 2023-05-25 DIAGNOSIS — F43.10 PTSD (POST-TRAUMATIC STRESS DISORDER): ICD-10-CM

## 2023-05-25 DIAGNOSIS — F33.1 MAJOR DEPRESSIVE DISORDER, RECURRENT EPISODE, MODERATE: Primary | Chronic | ICD-10-CM

## 2023-06-05 ENCOUNTER — OFFICE VISIT (OUTPATIENT)
Dept: PSYCHIATRY | Facility: CLINIC | Age: 28
End: 2023-06-05
Payer: COMMERCIAL

## 2023-06-05 DIAGNOSIS — F33.1 MAJOR DEPRESSIVE DISORDER, RECURRENT EPISODE, MODERATE: Primary | Chronic | ICD-10-CM

## 2023-06-05 DIAGNOSIS — F43.10 PTSD (POST-TRAUMATIC STRESS DISORDER): ICD-10-CM

## 2023-06-05 PROCEDURE — 90837 PSYTX W PT 60 MINUTES: CPT | Performed by: SOCIAL WORKER
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
## 2023-08-08 NOTE — PROGRESS NOTES
Date: August 9, 2023  Time In: 1401  Time Out: 2015      PROGRESS NOTE  Data:  Anayeli Burdick is a 28 y.o. female who presents today for individual therapy session at [facility] with ELISHA Rosenberg, JACQUELINE.     Patient Chief Complaint: Follow-up for depression and PTSD    Clinical Maneuvering/Intervention: Anayeli is pleasant alert and oriented to person place and time.  She talked about everything that she was going through right now in regards to her divorce, her moving, her finances, and her caregiving for her parents.  She is having difficulty sleeping and has little appetite.  She has mediation tomorrow and while she is hopeful that she can get what she has requested she is anxious about this process.  Part of this anxiety has to do with watching her parents mediation during their divorce and knowing how difficult this was.    Assisted patient in processing above session content; acknowledged and normalized patient's thoughts, feelings, and concerns. Rationalized patient thought process regarding using her skills for relaxation and mood stabilization and allowing herself time for self-care.. Discussed triggers associated with patient's depression and anxiety. Also discussed coping skills for patient to implement such as self stabilization skills.    Allowed patient to freely discuss issues without interruption or judgment. Provided safe, confidential environment to facilitate the development of positive therapeutic relationship and encourage open, honest communication. Assisted patient in identifying risk factors which would indicate the need for higher level of care including thoughts to harm self or others and/or self-harming behavior and encouraged patient to contact this office, call 911, or present to the nearest emergency room should any of these events occur. Discussed crisis intervention services and means to access. Patient adamantly and convincingly denies current suicidal  or homicidal ideation or perceptual disturbance.    Assessment   Patient appears to maintain relative stability as compared to their baseline. However, patient continues to struggle with depression which continues to cause impairment in important areas of functioning. A result, they can be reasonably expected to continue to benefit from treatment and would likely be at increased risk for decompensation otherwise.    Mental Status Exam:   Hygiene:   good  Cooperation:  Cooperative  Eye Contact:  Good  Psychomotor Behavior:  Appropriate  Affect:  Appropriate  Mood: depressed, anxious  Speech:  Normal  Thought Process:  Goal directed and Linear  Thought Content:  Normal  Suicidal:  None  Homicidal:  None  Hallucinations:  None  Delusion:  None  Memory:  Intact  Orientation:  Person, Place, Time and Situation  Reliability:  good  Insight:  Good  Judgement:  Good  Impulse Control:  Fair  Physical/Medical Issues:  See diagnosis list     PHQ-Score Total:  PHQ-9 Total Score: PHQ-9 Depression Screening  Little interest or pleasure in doing things? 2-->more than half the days   Feeling down, depressed, or hopeless? 3-->nearly every day   Trouble falling or staying asleep, or sleeping too much? 0-->not at all   Feeling tired or having little energy? 1-->several days   Poor appetite or overeating? 2-->more than half the days   Feeling bad about yourself - or that you are a failure or have let yourself or your family down? 2-->more than half the days   Trouble concentrating on things, such as reading the newspaper or watching television? 1-->several days   Moving or speaking so slowly that other people could have noticed? Or the opposite - being so fidgety or restless that you have been moving around a lot more than usual? 1-->several days   Thoughts that you would be better off dead, or of hurting yourself in some way? 0-->not at all   PHQ-9 Total Score 12   If you checked off any problems, how difficult have these problems  made it for you to do your work, take care of things at home, or get along with other people?        ADA-7 Total Score:   Over the last two weeks, how often have you been bothered by the following problems?  Feeling nervous, anxious or on edge: Several days  Not being able to stop or control worrying: Several days  Worrying too much about different things: Several days  Trouble Relaxing: Several days  Being so restless that it is hard to sit still: Several days  Becoming easily annoyed or irritable: More than half the days  Feeling afraid as if something awful might happen: Several days  ADA 7 Total Score: 8     Patient's Support Network Includes:  mother     Functional Status: Moderate impairment      Progress toward goal: Not at goal     Prognosis: Good with Ongoing Treatment          Plan     Resources: Patient was provided with the following community resources: None at this time    Patient will continue in individual outpatient therapy with focus on improved functioning and coping skills, maintaining stability, and avoiding decompensation and the need for higher level of care.    Patient will adhere to any medication regimens as prescribed and report any side effects. Patient will contact this office, call 911 or present to the nearest emergency room should suicidal or homicidal ideations occur. Provide cognitive behavioral therapy and solution focused therapy to improve functioning, maintain stability and avoid decompensation and the need for higher level of care.     Return in about 4-6 weeks, or earlier if symptoms worsen or fail to improve.           VISIT DIAGNOSIS:     ICD-10-CM ICD-9-CM   1. Major depressive disorder, recurrent episode, moderate  F33.1 296.32   2. PTSD (post-traumatic stress disorder)  F43.10 309.81            This document has been electronically signed by ELISHA Rosenberg, JACQUELINE   August 9, 2023 15:06 EDT      Part of this note may be an electronic transcription/translation of spoken  language to printed text using the Dragon Dictation System.

## 2023-08-09 ENCOUNTER — OFFICE VISIT (OUTPATIENT)
Dept: PSYCHIATRY | Facility: CLINIC | Age: 28
End: 2023-08-09
Payer: COMMERCIAL

## 2023-08-09 DIAGNOSIS — F33.1 MAJOR DEPRESSIVE DISORDER, RECURRENT EPISODE, MODERATE: Primary | ICD-10-CM

## 2023-08-09 DIAGNOSIS — F43.10 PTSD (POST-TRAUMATIC STRESS DISORDER): ICD-10-CM

## 2023-08-23 DIAGNOSIS — F90.0 ADHD, PREDOMINANTLY INATTENTIVE TYPE: ICD-10-CM

## 2023-08-23 RX ORDER — DEXTROAMPHETAMINE SACCHARATE, AMPHETAMINE ASPARTATE, DEXTROAMPHETAMINE SULFATE AND AMPHETAMINE SULFATE 2.5; 2.5; 2.5; 2.5 MG/1; MG/1; MG/1; MG/1
5 TABLET ORAL 2 TIMES DAILY
Qty: 30 TABLET | Refills: 0 | Status: CANCELLED | OUTPATIENT
Start: 2023-08-23 | End: 2024-08-22

## 2023-08-23 NOTE — TELEPHONE ENCOUNTER
Rx Refill Note  Requested Prescriptions     Pending Prescriptions Disp Refills    amphetamine-dextroamphetamine (Adderall) 10 MG tablet 30 tablet 0     Sig: Take 0.5 tablets by mouth 2 (Two) Times a Day.      Last office visit with prescribing clinician: Visit date not found   Last telemedicine visit with prescribing clinician: Visit date not found   Next office visit with prescribing clinician: Visit date not found   Office Visit with Rae Chatterjee MD (03/03/2023)   [facility] Full Urine Drug Screen - (03/03/2023)                     Would you like a call back once the refill request has been completed: [] Yes [] No    If the office needs to give you a call back, can they leave a voicemail: [] Yes [] No    Gracia Celeste MA  08/23/23, 09:25 EDT    Last fill 7-24-23

## 2023-08-24 RX ORDER — DEXTROAMPHETAMINE SACCHARATE, AMPHETAMINE ASPARTATE, DEXTROAMPHETAMINE SULFATE AND AMPHETAMINE SULFATE 2.5; 2.5; 2.5; 2.5 MG/1; MG/1; MG/1; MG/1
5 TABLET ORAL 2 TIMES DAILY
Qty: 30 TABLET | Refills: 0 | Status: SHIPPED | OUTPATIENT
Start: 2023-08-24 | End: 2024-08-23

## 2023-09-06 ENCOUNTER — OFFICE VISIT (OUTPATIENT)
Dept: PSYCHIATRY | Facility: CLINIC | Age: 28
End: 2023-09-06
Payer: COMMERCIAL

## 2023-09-06 DIAGNOSIS — F33.1 MAJOR DEPRESSIVE DISORDER, RECURRENT EPISODE, MODERATE: Primary | Chronic | ICD-10-CM

## 2023-09-06 DIAGNOSIS — F90.0 ADHD, PREDOMINANTLY INATTENTIVE TYPE: Chronic | ICD-10-CM

## 2023-09-06 DIAGNOSIS — F43.10 PTSD (POST-TRAUMATIC STRESS DISORDER): ICD-10-CM

## 2023-09-06 RX ORDER — DEXTROAMPHETAMINE SACCHARATE, AMPHETAMINE ASPARTATE, DEXTROAMPHETAMINE SULFATE AND AMPHETAMINE SULFATE 2.5; 2.5; 2.5; 2.5 MG/1; MG/1; MG/1; MG/1
10 TABLET ORAL 2 TIMES DAILY
Qty: 60 TABLET | Refills: 0 | Status: SHIPPED | OUTPATIENT
Start: 2023-09-06 | End: 2024-09-05
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
## 2023-09-13 ENCOUNTER — HOSPITAL ENCOUNTER (EMERGENCY)
Facility: HOSPITAL | Age: 28
Discharge: HOME | End: 2023-09-13
Attending: EMERGENCY MEDICINE
Payer: COMMERCIAL

## 2023-09-13 VITALS
RESPIRATION RATE: 15 BRPM | HEIGHT: 63 IN | TEMPERATURE: 98.5 F | WEIGHT: 163 LBS | DIASTOLIC BLOOD PRESSURE: 78 MMHG | SYSTOLIC BLOOD PRESSURE: 119 MMHG | HEART RATE: 89 BPM | BODY MASS INDEX: 28.88 KG/M2 | OXYGEN SATURATION: 99 %

## 2023-09-13 DIAGNOSIS — K13.79 MOUTH PAIN: ICD-10-CM

## 2023-09-13 DIAGNOSIS — R22.0 LIP SWELLING: ICD-10-CM

## 2023-09-13 DIAGNOSIS — K13.70 MOUTH LESION: ICD-10-CM

## 2023-09-13 DIAGNOSIS — B08.4 HAND, FOOT AND MOUTH DISEASE (HFMD): Primary | ICD-10-CM

## 2023-09-13 PROCEDURE — 99282 EMERGENCY DEPT VISIT SF MDM: CPT

## 2023-09-13 NOTE — Clinical Note
[facility] EMERGENCY DEPARTMENT  [address] [place] [address]  Phone: [phone]    Anayeli Burdick was seen and treated in our emergency department on 9/13/2023.  She may return to work on 09/14/2023.         Thank you for choosing [facility].    Blaire Page RN

## 2023-09-13 NOTE — ED PROVIDER NOTES
"Subjective   History of Present Illness  Patient is a pleasant 28-year-old female with a history of anxiety and claustrophobia who presents the emergency room with complaints of facial swelling and \"rash in her mouth\" that started Sunday.  On Saturday, patient was diagnosed with a yeast infection and began taking tioconazole, fluconazole, triamcinolone and nystatin.  Sunday she woke up \"bigger than a potato\" with lip swelling and sores in her mouth.  She saw her primary care provider on Monday who advised patient to take Benadryl.  She states that the Benadryl helped with the swelling but did not help with the pain.  She has been taking Tylenol 500 mg twice daily but believes that her sores are getting worse.  She cannot eat or drink due to the pain.  She has had no known exposure and has no children.  She has taken the above medications previously and does not believe this is an allergic reaction.  She did not take any of her medications this morning.  She has several allergies to NSAIDs and has been unable to take these as well.  She has had no fever, cough, throat pain or body rash.    Review of Systems   Constitutional:  Positive for appetite change. Negative for fever.   HENT:  Positive for facial swelling and mouth sores. Negative for congestion.    Respiratory:  Negative for shortness of breath.    Cardiovascular:  Negative for chest pain.   Gastrointestinal:  Negative for abdominal pain, nausea and vomiting.   Genitourinary:  Negative for dysuria.   Musculoskeletal:  Negative for arthralgias and myalgias.   Neurological:  Negative for dizziness and syncope.   Psychiatric/Behavioral:  Negative for confusion. The patient is not nervous/anxious.    All other systems reviewed and are negative.    Past Medical History:   Diagnosis Date    ADHD 1995    Anxiety 2013    Asthma 2004    Back pain 2020    Back problem     Claustrophobia     Depression 2017    Headache 2013    Visual impairment 2003       Allergies "   Allergen Reactions    Adhesive Tape Itching, Rash and Swelling    Aripiprazole Anxiety and Mental Status Change    Aspirin Itching, Swelling and GI Bleeding    Bee Venom Anaphylaxis, Hives, Itching, Swelling and Rash    Diclofenac Hives, Itching, Rash and Swelling     Other reaction(s): GI Bleeding    Diclofenac Potassium Hives, Itching, Swelling, Rash and GI Bleeding    Ibuprofen Itching, Swelling, Rash and GI Bleeding    Iodinated Contrast Media Hives, Itching, Swelling and Rash    Iodine Hives, Itching, Swelling and Rash    Keflex [Cephalexin] Itching, Swelling, Rash and GI Bleeding    Latex Hives, Itching, Swelling and Rash    Levofloxacin Hives, Nausea And Vomiting and Swelling    Oxycodone-Acetaminophen Hives, Itching, Swelling, Rash and GI Bleeding    Penicillins Itching, Swelling, Rash and GI Bleeding    Promethazine Hives, Itching, Swelling and Rash    Quetiapine Anxiety, Mental Status Change and Confusion    Shrimp Anaphylaxis, Hives, Itching, Swelling and Rash    Sulfa Antibiotics Hives, Itching, Nausea And Vomiting and Rash    Tape Itching, Swelling and Rash    Tramadol Swelling, Rash and GI Bleeding    Qelbree [Viloxazine] GI Intolerance    Gabapentin Palpitations     And dizziness        Past Surgical History:   Procedure Laterality Date    FOOT SURGERY Left 2009    left foot had non cancer mass    FOOT SURGERY Left 2010    left foot had non cancer mass again    NOSE SURGERY Bilateral 08/17/2021    SINUS SURGERY  2021    TONSILLECTOMY AND ADENOIDECTOMY Bilateral 12/2020    WISDOM TOOTH EXTRACTION Bilateral 2017       Family History   Problem Relation Age of Onset    Arthritis Mother     Multiple sclerosis Mother     Thyroid disease Mother     Calcium disorder Mother     Cataracts Mother     Cancer Mother     Thyroid nodules Mother     Stroke Mother     Osteoporotic fracture Mother     Hypertension Father     Heart attack Father     Heart disease Father     Parkinsonism Father     Abnormal EKG Father      Allergies Father     Post-traumatic stress disorder Father     Stroke Father     Thyroid disease Brother     Calcium disorder Brother     Vision loss Brother     Kidney disease Maternal Grandmother     Kidney failure Maternal Grandmother     Cancer Maternal Grandfather     Throat cancer Maternal Grandfather     Stroke Paternal Grandmother     Heart disease Paternal Grandmother     Cancer Paternal Grandfather        Social History     Socioeconomic History    Marital status:      Spouse name: Hector Burdick    Number of children: 0    Highest education level: Bachelor's degree (e.g., BA, AB, BS)   Tobacco Use    Smoking status: Never    Smokeless tobacco: Never    Tobacco comments:     Exposed to second hand smoke as a child   Vaping Use    Vaping Use: Never used   Substance and Sexual Activity    Alcohol use: Not Currently     Comment: only while on vacation    Drug use: Never    Sexual activity: Yes     Partners: Male     Birth control/protection: Implant     Comment: [place]           Objective   Physical Exam  Vitals and nursing note reviewed.   Constitutional:       General: She is awake. She is not in acute distress.     Appearance: Normal appearance. She is well-developed. She is not diaphoretic.   HENT:      Head: Normocephalic and atraumatic.      Right Ear: Tympanic membrane, ear canal and external ear normal.      Left Ear: Tympanic membrane, ear canal and external ear normal.      Mouth/Throat:      Lips: Lesions present.      Mouth: Mucous membranes are moist. Oral lesions present.      Pharynx: No posterior oropharyngeal erythema.      Tonsils: No tonsillar exudate.      Comments: 1 lesion noted to the left lower lip with a small amount of associated swelling.  Several lesions noted to the lower front buccal area and on inner cheeks bilaterally.  Oropharynx is clear of lesions, erythema and edema.  Airway and oropharynx are grossly patent.  Eyes:      Extraocular Movements: Extraocular movements  "intact.      Pupils: Pupils are equal, round, and reactive to light.   Cardiovascular:      Rate and Rhythm: Normal rate and regular rhythm.      Pulses: Normal pulses.      Heart sounds: Normal heart sounds. No murmur heard.  Pulmonary:      Effort: Pulmonary effort is normal.      Breath sounds: Normal breath sounds.   Abdominal:      General: Bowel sounds are normal.      Palpations: Abdomen is soft.      Tenderness: There is no abdominal tenderness.   Musculoskeletal:         General: Normal range of motion.      Cervical back: Normal range of motion and neck supple.   Skin:     General: Skin is warm and dry.      Capillary Refill: Capillary refill takes less than 2 seconds.   Neurological:      General: No focal deficit present.      Mental Status: She is alert and oriented to person, place, and time. Mental status is at baseline.      GCS: GCS eye subscore is 4. GCS verbal subscore is 5. GCS motor subscore is 6.      Cranial Nerves: Cranial nerves 2-12 are intact.      Sensory: Sensation is intact.      Motor: Motor function is intact.      Deep Tendon Reflexes: Reflexes are normal and symmetric.   Psychiatric:         Mood and Affect: Mood normal.         Behavior: Behavior normal. Behavior is cooperative.       Procedures           ED Course      /78 (BP Location: Left arm, Patient Position: Sitting)   Pulse 89   Temp 98.5 °F (36.9 °C) (Oral)   Resp 15   Ht 160 cm (63\")   Wt 73.9 kg (163 lb)   SpO2 99%   BMI 28.87 kg/m²   .edm  .edlabseds  No radiology results for the last day                                       Medical Decision Making  Patient is a pleasant 28-year-old female with recent history of yeast infection who presents emergency room with complaints of sores in her mouth and lip swelling that started Sunday.  On exam, patient has several pinpoint lesions in her inner lower buccal spaces and in her bilateral cheeks.  Oropharynx is clear of lesions, exudate and erythema.  Tongue does " not appear swollen and has no deviation.  There is 1 single lesion noted to the left bottom lip.  No rash noted on hands, feet or trunk.  Pupils PERRLA.  Normal S1/S2 without clicks or murmurs.  Lungs clear to auscultation in all fields.  No cervical lymphadenopathy.  GCS 15.  Initial differentials include hand-foot-and-mouth disease, herpangina, allergic reaction, viral illness.  This is not a complete list.    Patient received above examination.  Labs were considered but not completed at this time because I do not believe they will aid in diagnosis.  Respiratory panel was considered as well but again, I do not believe it will aid in diagnosis or plan of care.  On exam, findings are concerning with hand-foot-and-mouth disease.  This was discussed with the patient, who cannot take NSAIDs due to allergies.  I discussed why steroids are contraindicated in this case because they can worsen viral replication and therefore worsen symptoms.  She was advised to continue taking Tylenol and to eat/drink foods that are not irritating such as those that are soft and cold.  She was advised to use ice pack on lip to help with swelling several times a day.  I did advise her that symptoms would last for couple days and to be patient.  She verbalized understanding is agreeable to plan of care.  Patient was given a note for work for tonight.  She has remained hemodynamically stable and is in no acute distress.  She was able to ambulate upright steadily without assistance upon discharge.    I discussed the findings with patient who voices understanding of discharge instructions, signs and symptoms requiring return to the ED; discharged improved and stable condition with follow-up for reevaluation.    Patient is aware that discharge does not mean that nothing is wrong but it indicates no emergency is present and they must continue care with follow-up as given below or physician of their choice.    This document is intended for medical  expert use only.  Reading of this document by patients and/or patient's family without participating medical staff guidance may result in misinterpretation and unintended morbidity.  Any interpretation of such data is the responsibility of the patient and/or family member responsible for the patient in concert with their primary or specialist providers, not to be left for sources of online search as such as [employer], [employer] or similar queries.  Relying on these approaches to knowledge may result in misinterpretation, misguided goals of care and even death should patient or family members try recommendations outside of the realm of professional medical care in a supervised inpatient environment.    This medical document was created using Dragon dictation system. Some errors in speech recognition may occur.    Final diagnoses:   Mouth lesion   Hand, foot and mouth disease (HFMD)   Mouth pain   Lip swelling       ED Disposition  ED Disposition       ED Disposition   Discharge    Condition   Stable    Comment   --               Shana Garcia, APRN  [address]  [place] [id number] [phone]               Medication List      No changes were made to your prescriptions during this visit.            Saloni Rivas, APRN  09/13/23 0757

## 2023-09-13 NOTE — ED NOTES
Pt. Began taking medication for yeast infection on saturaday. Sunday woke up with lips swollen, all over hives. Pt. States throat is sore and hard to eat/drink because of it.

## 2023-09-13 NOTE — DISCHARGE INSTRUCTIONS
Continue taking previously prescribed medications as directed for treatment of yeast infection.  Take up to 1000 mg Tylenol up to 3 times daily for pain.  Use ice for 20 minutes at a time several times a day.  Eat soft foods such as soups, pudding or applesauce until your mouth pain improves.  Avoid acidic food and beverage.      Follow-up with your primary care provider for further evaluation as needed.    Return to the ER for new or worsening symptoms.

## 2023-09-18 ENCOUNTER — OFFICE VISIT (OUTPATIENT)
Dept: PSYCHIATRY | Facility: CLINIC | Age: 28
End: 2023-09-18
Payer: COMMERCIAL

## 2023-09-18 DIAGNOSIS — F33.1 MAJOR DEPRESSIVE DISORDER, RECURRENT EPISODE, MODERATE: Primary | ICD-10-CM

## 2023-09-18 DIAGNOSIS — F43.10 PTSD (POST-TRAUMATIC STRESS DISORDER): ICD-10-CM

## 2023-09-18 NOTE — PROGRESS NOTES
"Date: September 18, 2023  Time In: 0805  Time Out: 0850      PROGRESS NOTE  Data:  Anayeli Burdick is a 28 y.o. female who presents today for individual therapy session at [facility] with ELISHA Rosenberg, BUSTERW.     Patient Chief Complaint: Follow-up for depression and PTSD    Clinical Maneuvering/Intervention: Anayeli is pleasant, alert and oriented to person place and time.  She states today she is a \"hot mess\".  She continues to have to work through her divorce which has been very stressful.  She talked about how she gives control over to other people when she has high anxiety or feels pressured to do this.  She also talked about how she has difficulty sleeping especially when she works nights.    (Scales based on 0 - 10 with 10 being the worst)  Depression: 0 Anxiety: 10       Assisted patient in processing above session content; acknowledged and normalized patient’s thoughts, feelings, and concerns. Rationalized patient thought process regarding giving control to others.. Discussed triggers associated with patient's depression and anxiety. Also discussed coping skills for patient to implement such as boundary setting with people especially in situations where she is being pressured to give up what she wants.  Working on good sleep hygiene and relaxation before sleep.    Allowed patient to freely discuss issues without interruption or judgment. Provided safe, confidential environment to facilitate the development of positive therapeutic relationship and encourage open, honest communication. Assisted patient in identifying risk factors which would indicate the need for higher level of care including thoughts to harm self or others and/or self-harming behavior and encouraged patient to contact this office, call 911, or present to the nearest emergency room should any of these events occur. Discussed crisis intervention services and means to access. Patient adamantly and convincingly " denies current suicidal or homicidal ideation or perceptual disturbance.    Assessment   Patient appears to maintain relative stability as compared to their baseline. However, patient continues to struggle with depression and PTSD which continues to cause impairment in important areas of functioning. A result, they can be reasonably expected to continue to benefit from treatment and would likely be at increased risk for decompensation otherwise.    Mental Status Exam:   Hygiene:   good  Cooperation:  Cooperative  Eye Contact:  Good  Psychomotor Behavior:  Appropriate  Affect:  Appropriate  Mood: depressed, anxious  Speech:  Normal  Thought Process:  Goal directed and Linear  Thought Content:  Normal  Suicidal:  None  Homicidal:  None  Hallucinations:  None  Delusion:  None  Memory:  Intact  Orientation:  Person, Place, Time and Situation  Reliability:  good  Insight:  Good  Judgement:  Good  Impulse Control:  Fair  Physical/Medical Issues:  See diagnosis list       Patient's Support Network Includes:  mother     Functional Status: Moderate impairment      Progress toward goal: Not at goal     Prognosis: Good with Ongoing Treatment           Plan     Resources: Patient was provided with the following community resources: None at this time.     Patient will continue in individual outpatient therapy with focus on improved functioning and coping skills, maintaining stability, and avoiding decompensation and the need for higher level of care.    Patient will adhere to any medication regimens as prescribed and report any side effects. Patient will contact this office, call 911 or present to the nearest emergency room should suicidal or homicidal ideations occur. Provide cognitive behavioral therapy and solution focused therapy to improve functioning, maintain stability and avoid decompensation and the need for higher level of care.     Return in about 4-6 weeks, or earlier if symptoms worsen or fail to improve.            VISIT DIAGNOSIS:     ICD-10-CM ICD-9-CM   1. Major depressive disorder, recurrent episode, moderate  F33.1 296.32   2. PTSD (post-traumatic stress disorder)  F43.10 309.81            This document has been electronically signed by ELISHA Rosenberg, BUSTERW   September 18, 2023 08:51 EDT      Part of this note may be an electronic transcription/translation of spoken language to printed text using the Dragon Dictation System.

## 2023-10-12 ENCOUNTER — APPOINTMENT (OUTPATIENT)
Dept: CT IMAGING | Facility: HOSPITAL | Age: 28
End: 2023-10-12
Payer: COMMERCIAL

## 2023-10-12 ENCOUNTER — HOSPITAL ENCOUNTER (OUTPATIENT)
Facility: HOSPITAL | Age: 28
Setting detail: OBSERVATION
Discharge: HOME | End: 2023-10-13
Attending: EMERGENCY MEDICINE | Admitting: EMERGENCY MEDICINE
Payer: COMMERCIAL

## 2023-10-12 DIAGNOSIS — T78.3XXA ANGIOEDEMA, INITIAL ENCOUNTER: ICD-10-CM

## 2023-10-12 DIAGNOSIS — T78.40XA ALLERGIC REACTION, INITIAL ENCOUNTER: Primary | ICD-10-CM

## 2023-10-12 LAB
ALBUMIN SERPL-MCNC: 4.1 G/DL (ref 3.5–5.2)
ALBUMIN/GLOB SERPL: 1.4 G/DL
ALP SERPL-CCNC: 33 U/L (ref 39–117)
ALT SERPL W P-5'-P-CCNC: 17 U/L (ref 1–33)
ANION GAP SERPL CALCULATED.3IONS-SCNC: 8.5 MMOL/L (ref 5–15)
AST SERPL-CCNC: 17 U/L (ref 1–32)
BASOPHILS # BLD AUTO: 0.01 10*3/MM3 (ref 0–0.2)
BASOPHILS NFR BLD AUTO: 0.2 % (ref 0–1.5)
BILIRUB SERPL-MCNC: 0.5 MG/DL (ref 0–1.2)
BUN SERPL-MCNC: 16 MG/DL (ref 6–20)
BUN/CREAT SERPL: 23.2 (ref 7–25)
CALCIUM SPEC-SCNC: 9.1 MG/DL (ref 8.6–10.5)
CHLORIDE SERPL-SCNC: 103 MMOL/L (ref 98–107)
CO2 SERPL-SCNC: 25.5 MMOL/L (ref 22–29)
CREAT SERPL-MCNC: 0.69 MG/DL (ref 0.57–1)
DEPRECATED RDW RBC AUTO: 38.8 FL (ref 37–54)
EGFRCR SERPLBLD CKD-EPI 2021: 121.4 ML/MIN/1.73
EOSINOPHIL # BLD AUTO: 0.28 10*3/MM3 (ref 0–0.4)
EOSINOPHIL NFR BLD AUTO: 5.7 % (ref 0.3–6.2)
ERYTHROCYTE [DISTWIDTH] IN BLOOD BY AUTOMATED COUNT: 12.4 % (ref 12.3–15.4)
GLOBULIN UR ELPH-MCNC: 3 GM/DL
GLUCOSE SERPL-MCNC: 95 MG/DL (ref 65–99)
HCG SERPL QL: NEGATIVE
HCT VFR BLD AUTO: 43.4 % (ref 34–46.6)
HGB BLD-MCNC: 14.4 G/DL (ref 12–15.9)
IMM GRANULOCYTES # BLD AUTO: 0.01 10*3/MM3 (ref 0–0.05)
IMM GRANULOCYTES NFR BLD AUTO: 0.2 % (ref 0–0.5)
LYMPHOCYTES # BLD AUTO: 1.39 10*3/MM3 (ref 0.7–3.1)
LYMPHOCYTES NFR BLD AUTO: 28.2 % (ref 19.6–45.3)
MCH RBC QN AUTO: 28.7 PG (ref 26.6–33)
MCHC RBC AUTO-ENTMCNC: 33.2 G/DL (ref 31.5–35.7)
MCV RBC AUTO: 86.6 FL (ref 79–97)
MONOCYTES # BLD AUTO: 0.87 10*3/MM3 (ref 0.1–0.9)
MONOCYTES NFR BLD AUTO: 17.6 % (ref 5–12)
NEUTROPHILS NFR BLD AUTO: 2.37 10*3/MM3 (ref 1.7–7)
NEUTROPHILS NFR BLD AUTO: 48.1 % (ref 42.7–76)
NRBC BLD AUTO-RTO: 0 /100 WBC (ref 0–0.2)
PLATELET # BLD AUTO: 200 10*3/MM3 (ref 140–450)
PMV BLD AUTO: 9.6 FL (ref 6–12)
POTASSIUM SERPL-SCNC: 4.1 MMOL/L (ref 3.5–5.2)
PROT SERPL-MCNC: 7.1 G/DL (ref 6–8.5)
RBC # BLD AUTO: 5.01 10*6/MM3 (ref 3.77–5.28)
SODIUM SERPL-SCNC: 137 MMOL/L (ref 136–145)
WBC NRBC COR # BLD: 4.93 10*3/MM3 (ref 3.4–10.8)

## 2023-10-12 PROCEDURE — 25010000002 DIPHENHYDRAMINE PER 50 MG: Performed by: PHYSICIAN ASSISTANT

## 2023-10-12 PROCEDURE — 96374 THER/PROPH/DIAG INJ IV PUSH: CPT

## 2023-10-12 PROCEDURE — G0378 HOSPITAL OBSERVATION PER HR: HCPCS

## 2023-10-12 PROCEDURE — 85025 COMPLETE CBC W/AUTO DIFF WBC: CPT | Performed by: PHYSICIAN ASSISTANT

## 2023-10-12 PROCEDURE — 96376 TX/PRO/DX INJ SAME DRUG ADON: CPT

## 2023-10-12 PROCEDURE — 25010000002 DIPHENHYDRAMINE PER 50 MG

## 2023-10-12 PROCEDURE — 25010000002 METHYLPREDNISOLONE PER 125 MG: Performed by: PHYSICIAN ASSISTANT

## 2023-10-12 PROCEDURE — 80053 COMPREHEN METABOLIC PANEL: CPT | Performed by: PHYSICIAN ASSISTANT

## 2023-10-12 PROCEDURE — 36415 COLL VENOUS BLD VENIPUNCTURE: CPT

## 2023-10-12 PROCEDURE — 96375 TX/PRO/DX INJ NEW DRUG ADDON: CPT

## 2023-10-12 PROCEDURE — 84703 CHORIONIC GONADOTROPIN ASSAY: CPT | Performed by: PHYSICIAN ASSISTANT

## 2023-10-12 PROCEDURE — 70490 CT SOFT TISSUE NECK W/O DYE: CPT

## 2023-10-12 PROCEDURE — 99284 EMERGENCY DEPT VISIT MOD MDM: CPT

## 2023-10-12 RX ORDER — DIPHENHYDRAMINE HYDROCHLORIDE 50 MG/ML
25 INJECTION INTRAMUSCULAR; INTRAVENOUS ONCE
Status: COMPLETED | OUTPATIENT
Start: 2023-10-12 | End: 2023-10-12

## 2023-10-12 RX ORDER — SODIUM CHLORIDE 0.9 % (FLUSH) 0.9 %
10 SYRINGE (ML) INJECTION EVERY 12 HOURS SCHEDULED
Status: DISCONTINUED | OUTPATIENT
Start: 2023-10-12 | End: 2023-10-13 | Stop reason: HOSPADM

## 2023-10-12 RX ORDER — FAMOTIDINE 10 MG/ML
20 INJECTION, SOLUTION INTRAVENOUS EVERY 12 HOURS SCHEDULED
Status: DISCONTINUED | OUTPATIENT
Start: 2023-10-13 | End: 2023-10-13 | Stop reason: HOSPADM

## 2023-10-12 RX ORDER — FAMOTIDINE 10 MG/ML
20 INJECTION, SOLUTION INTRAVENOUS ONCE
Status: COMPLETED | OUTPATIENT
Start: 2023-10-12 | End: 2023-10-12

## 2023-10-12 RX ORDER — NITROGLYCERIN 0.4 MG/1
0.4 TABLET SUBLINGUAL
Status: DISCONTINUED | OUTPATIENT
Start: 2023-10-12 | End: 2023-10-12

## 2023-10-12 RX ORDER — DIPHENHYDRAMINE HYDROCHLORIDE 50 MG/ML
25 INJECTION INTRAMUSCULAR; INTRAVENOUS EVERY 6 HOURS PRN
Status: DISCONTINUED | OUTPATIENT
Start: 2023-10-12 | End: 2023-10-13 | Stop reason: HOSPADM

## 2023-10-12 RX ORDER — SODIUM CHLORIDE 0.9 % (FLUSH) 0.9 %
10 SYRINGE (ML) INJECTION AS NEEDED
Status: DISCONTINUED | OUTPATIENT
Start: 2023-10-12 | End: 2023-10-13 | Stop reason: HOSPADM

## 2023-10-12 RX ORDER — SODIUM CHLORIDE 9 MG/ML
40 INJECTION, SOLUTION INTRAVENOUS AS NEEDED
Status: DISCONTINUED | OUTPATIENT
Start: 2023-10-12 | End: 2023-10-13 | Stop reason: HOSPADM

## 2023-10-12 RX ORDER — METHYLPREDNISOLONE SODIUM SUCCINATE 125 MG/2ML
125 INJECTION, POWDER, LYOPHILIZED, FOR SOLUTION INTRAMUSCULAR; INTRAVENOUS ONCE
Status: COMPLETED | OUTPATIENT
Start: 2023-10-12 | End: 2023-10-12

## 2023-10-12 RX ORDER — METHYLPREDNISOLONE SODIUM SUCCINATE 125 MG/2ML
60 INJECTION, POWDER, LYOPHILIZED, FOR SOLUTION INTRAMUSCULAR; INTRAVENOUS EVERY 8 HOURS
Status: DISCONTINUED | OUTPATIENT
Start: 2023-10-12 | End: 2023-10-13

## 2023-10-12 RX ORDER — EPINEPHRINE 0.3 MG/.3ML
0.3 INJECTION SUBCUTANEOUS ONCE
Qty: 1 EACH | Refills: 0 | OUTPATIENT
Start: 2023-10-12 | End: 2023-10-12

## 2023-10-12 RX ADMIN — FAMOTIDINE 20 MG: 10 INJECTION INTRAVENOUS at 09:05

## 2023-10-12 RX ADMIN — Medication 10 ML: at 20:20

## 2023-10-12 RX ADMIN — DIPHENHYDRAMINE HYDROCHLORIDE 25 MG: 50 INJECTION, SOLUTION INTRAMUSCULAR; INTRAVENOUS at 20:10

## 2023-10-12 RX ADMIN — DIPHENHYDRAMINE HYDROCHLORIDE 25 MG: 50 INJECTION, SOLUTION INTRAMUSCULAR; INTRAVENOUS at 16:40

## 2023-10-12 RX ADMIN — METHYLPREDNISOLONE SODIUM SUCCINATE 125 MG: 125 INJECTION, POWDER, FOR SOLUTION INTRAMUSCULAR; INTRAVENOUS at 09:05

## 2023-10-12 RX ADMIN — METHYLPREDNISOLONE SODIUM SUCCINATE 60 MG: 125 INJECTION, POWDER, FOR SOLUTION INTRAMUSCULAR; INTRAVENOUS at 20:15

## 2023-10-12 RX ADMIN — FAMOTIDINE 20 MG: 10 INJECTION, SOLUTION INTRAVENOUS at 16:40

## 2023-10-12 RX ADMIN — DIPHENHYDRAMINE HYDROCHLORIDE 25 MG: 50 INJECTION, SOLUTION INTRAMUSCULAR; INTRAVENOUS at 09:05

## 2023-10-12 NOTE — PLAN OF CARE
Goal Outcome Evaluation:patient came to obs unit for eval/tx of oral edema post allergic reaction. Patient was treated with meds per mdorder in er. Patient mother is bedside. Patient tongue has trace edema at this time and no lip swelling at this time. Continue with medication tx.

## 2023-10-12 NOTE — H&P
[facility]   HISTORY AND PHYSICAL    Patient Name: Anayeli Burdick  : 1995  MRN: [id number]  Primary Care Physician:  Shana Garcia APRN  Date of admission: 10/12/2023    Subjective   Subjective     Chief Complaint:   Chief Complaint   Patient presents with    Allergic Reaction         HPI:    Anayeli Burdick is a 28 y.o. female with history of ADHD, anxiety, and several drug allergies, who presented to the emergency department today after taking Diflucan last night for vaginal candidiasis.  Patient developed swelling of her tongue and felt that she had difficulty swallowing.  Patient is able to handle her own secretions.  Patient reports she does follow with an allergist and has an EpiPen but states she has never used it and it  approximately 9 years ago.    ED evaluation reviewed, patient received 125 mg of Solu-Medrol, 20 mg Pepcid IV, and 25 mg Benadryl IV and symptoms improved temporarily.  However, after several hours patient reported her symptoms were starting to return.  Patient subsequently had CT of soft neck which was essentially normal, no evidence of prevertebral swelling or oropharyngeal swelling.  Patient is being admitted to observation unit for monitoring and additional treatment.    Review of Systems   All systems were reviewed and negative except for: What is discussed in the HPI    Personal History     Past Medical History:   Diagnosis Date    ADHD     Anxiety     Asthma 2004    Back pain     Back problem     Claustrophobia     Depression     Headache 2013    Visual impairment        Past Surgical History:   Procedure Laterality Date    FOOT SURGERY Left 2009    left foot had non cancer mass    FOOT SURGERY Left 2010    left foot had non cancer mass again    NOSE SURGERY Bilateral 2021    SINUS SURGERY      TONSILLECTOMY AND ADENOIDECTOMY Bilateral 2020    WISDOM TOOTH EXTRACTION Bilateral        Family History: family  history includes Abnormal EKG in her father; Allergies in her father; Arthritis in her mother; Calcium disorder in her brother and mother; Cancer in her maternal grandfather, mother, and paternal grandfather; Cataracts in her mother; Heart attack in her father; Heart disease in her father and paternal grandmother; Hypertension in her father; Kidney disease in her maternal grandmother; Kidney failure in her maternal grandmother; Multiple sclerosis in her mother; Osteoporotic fracture in her mother; Parkinsonism in her father; Post-traumatic stress disorder in her father; Stroke in her father, mother, and paternal grandmother; Throat cancer in her maternal grandfather; Thyroid disease in her brother and mother; Thyroid nodules in her mother; Vision loss in her brother. Otherwise pertinent FHx was reviewed and not pertinent to current issue.    Social History:  reports that she has never smoked. She has never used smokeless tobacco. She reports that she does not currently use alcohol. She reports that she does not use drugs.    Home Medications:  Levomefolate Glucosamine, Methylcobalamin, SUMAtriptan, Semaglutide-Weight Management, Vortioxetine HBr, albuterol, albuterol sulfate HFA, amphetamine-dextroamphetamine, fluconazole, lidocaine, and tiotropium bromide monohydrate    Allergies:  Allergies   Allergen Reactions    Adhesive Tape Itching, Rash and Swelling    Aripiprazole Anxiety and Mental Status Change    Aspirin Itching, Swelling and GI Bleeding    Bee Venom Anaphylaxis, Hives, Itching, Swelling and Rash    Diclofenac Hives, Itching, Rash and Swelling     Other reaction(s): GI Bleeding    Diclofenac Potassium Hives, Itching, Swelling, Rash and GI Bleeding    Diflucan [Fluconazole] Swelling    Ibuprofen Itching, Swelling, Rash and GI Bleeding    Iodinated Contrast Media Hives, Itching, Swelling and Rash    Iodine Hives, Itching, Swelling and Rash    Keflex [Cephalexin] Itching, Swelling, Rash and GI Bleeding     Latex Hives, Itching, Swelling and Rash    Levofloxacin Hives, Nausea And Vomiting and Swelling    Oxycodone-Acetaminophen Hives, Itching, Swelling, Rash and GI Bleeding    Penicillins Itching, Swelling, Rash and GI Bleeding    Promethazine Hives, Itching, Swelling and Rash    Quetiapine Anxiety, Mental Status Change and Confusion    Shrimp Anaphylaxis, Hives, Itching, Swelling and Rash    Sulfa Antibiotics Hives, Itching, Nausea And Vomiting and Rash    Tape Itching, Swelling and Rash    Tramadol Swelling, Rash and GI Bleeding    Qelbree [Viloxazine] GI Intolerance    Gabapentin Palpitations     And dizziness        Objective   Objective     Vitals:   Temp:  [98 øF (36.7 øC)-99 øF (37.2 øC)] 99 øF (37.2 øC)  Heart Rate:  [84-96] 85  Resp:  [16] 16  BP: (100-130)/(61-81) 119/66  Physical Exam     GENERAL: 28 y.o. YO female a/o x 4, NAD  SKIN: Warm pink and dry   HEENT:  PERRL, EOM intact, conjunctivae normal, sclerae clear, muffled voice, oropharynx patent, soft palate rises as expected, no swelling to underside of tongue, patient able to handle her own secretions  NECK: supple, no JVD  LUNGS: Clear to auscultation bilaterally without wheezes, rales or rhonchi.  No accessory muscle use and no nasal flaring.  CARDIAC:  Regular rate and rhythm, S1-S2.  No murmurs, rubs or gallops.  No peripheral edema.  Equal pulses bilaterally.  ABDOMEN: Soft, nontender, nondistended.  No guarding or rebound tenderness.  Normal bowel sounds.  MUSCULOSKELETAL: Moves all extremities well.  No deformity.  NEURO: Cranial nerves II through XII grossly intact.  No gross focal deficits.  Alert.  Normal speech and motor.  PSYCH: Normal mood and affect      Result Review    Result Review:  I have personally reviewed the results from the time of this admission to 10/12/2023 18:39 EDT and agree with these findings:  [x]  Laboratory list / accordion  []  Microbiology  [x]  Radiology  []  EKG/Telemetry   []  Cardiology/Vascular   []   Pathology  []  Old records  []  Other:  Most notable findings include:     CT Soft Tissue Neck Without Contrast    Result Date: 10/12/2023  1. Essentially normal neck CT. By history the patient had an allergic reaction and has difficulty swallowing and some lip and tongue swelling, subtle swelling of the lips and tongue oftentimes not appreciable on the neck CT and correlation with clinical findings is suggested.  Radiation dose reduction techniques were utilized, including automated exposure control and exposure modulation based on body size.            Assessment & Plan   Assessment / Plan     Brief Patient Summary:  Anayeli Burdick is a 28 y.o. female who is being admitted to observation unit for evaluation and treatment of allergic reaction    Active Hospital Problems:  Active Hospital Problems    Diagnosis     **Allergic reaction      Plan:     Allergic reaction  -Patient took 1 dose of Diflucan last night and subsequently had tongue swelling and difficulty swallowing  -CT soft tissue neck in ED unremarkable  -Continue IV Benadryl and IV steroids overnight, repeat IV Pepcid in a.m.  -Bedside swallow screen prior to eating or drinking  -Monitor closely    DVT prophylaxis:  Mechanical DVT prophylaxis orders are present.    CODE STATUS:    Level Of Support Discussed With: Patient  Code Status (Patient has no pulse and is not breathing): CPR (Attempt to Resuscitate)  Medical Interventions (Patient has pulse or is breathing): Full Support    Admission Status:  I believe this patient meets observation status.     75 minutes has been spent by [facility] providers in the care of this patient while under observation status    I wore an appropriate PPE during this patient encounter.  Hand hygeine performed before and after seeing the patient.    Electronically signed by TAY Pacheco, 10/12/23, 6:39 PM EDT.

## 2023-10-12 NOTE — ED PROVIDER NOTES
EMERGENCY DEPARTMENT ENCOUNTER    Room Number:  T03/03  Date of encounter:  10/12/2023  PCP: Shana Garcia APRN  Historian: Patient  Chronic or social conditions impacting care (social determinants of health): Nothing    HPI:  Chief Complaint: Facial swelling  A complete HPI/ROS/PMH/PSH/SH/FH are unobtainable due to: Nothing    Context: Anayeli Burdick is a 28 y.o. female who presents to the ED c/o approximate 24-hour history of lip and tongue swelling.  Patient denies any rash or shortness of breath.  Patient does have a slightly muffled voice.  Patient does have a history of significant allergic reactions to multiple medications.  Patient attributes this reaction to taking a Diflucan yesterday.  She is taking this for vaginal candidiasis.  This was her first dose.  She has had this in the past without difficulty.  She does have an EpiPen however denies ever having to use it.    Review of prior external notes (non-ED):   I reviewed ER visit from 9/13/2023.  Patient seen for hand-foot-and-mouth disease.    Review of prior external test results outside of this encounter:  I reviewed a CMP from 6/8/2023.  Creatinine 0.77, potassium 3.9    PAST MEDICAL HISTORY  Active Ambulatory Problems     Diagnosis Date Noted    ADHD, predominantly inattentive type 04/18/2018    Asthma 08/28/2017    Major depressive disorder, recurrent episode, moderate 04/18/2018    PTSD (post-traumatic stress disorder) 04/18/2018    Chronic back pain 10/01/2021    Dietary counseling 12/03/2021    Obesity, Class II, BMI 35-39.9 03/21/2022    Pain in thoracic spine 04/12/2022    Neck pain 04/12/2022     Resolved Ambulatory Problems     Diagnosis Date Noted    Obesity, Class III, BMI 40-49.9 (morbid obesity) 12/03/2021     Past Medical History:   Diagnosis Date    ADHD 1995    Anxiety 2013    Back pain 2020    Back problem     Claustrophobia     Depression 2017    Headache 2013    Visual impairment 2003         PAST SURGICAL HISTORY  Past  Surgical History:   Procedure Laterality Date    FOOT SURGERY Left 2009    left foot had non cancer mass    FOOT SURGERY Left 2010    left foot had non cancer mass again    NOSE SURGERY Bilateral 08/17/2021    SINUS SURGERY  2021    TONSILLECTOMY AND ADENOIDECTOMY Bilateral 12/2020    WISDOM TOOTH EXTRACTION Bilateral 2017         FAMILY HISTORY  Family History   Problem Relation Age of Onset    Arthritis Mother     Multiple sclerosis Mother     Thyroid disease Mother     Calcium disorder Mother     Cataracts Mother     Cancer Mother     Thyroid nodules Mother     Stroke Mother     Osteoporotic fracture Mother     Hypertension Father     Heart attack Father     Heart disease Father     Parkinsonism Father     Abnormal EKG Father     Allergies Father     Post-traumatic stress disorder Father     Stroke Father     Thyroid disease Brother     Calcium disorder Brother     Vision loss Brother     Kidney disease Maternal Grandmother     Kidney failure Maternal Grandmother     Cancer Maternal Grandfather     Throat cancer Maternal Grandfather     Stroke Paternal Grandmother     Heart disease Paternal Grandmother     Cancer Paternal Grandfather          SOCIAL HISTORY  Social History     Socioeconomic History    Marital status:      Spouse name: Hector Burdick    Number of children: 0    Highest education level: Bachelor's degree (e.g., BA, AB, BS)   Tobacco Use    Smoking status: Never    Smokeless tobacco: Never    Tobacco comments:     Exposed to second hand smoke as a child   Vaping Use    Vaping Use: Never used   Substance and Sexual Activity    Alcohol use: Not Currently     Comment: only while on vacation    Drug use: Never    Sexual activity: Yes     Partners: Male     Birth control/protection: Implant     Comment: [employer]         ALLERGIES  Adhesive tape, Aripiprazole, Aspirin, Bee venom, Diclofenac, Diclofenac potassium, Diflucan [fluconazole], Ibuprofen, Iodinated contrast media, Iodine, Keflex [cephalexin],  Latex, Levofloxacin, Oxycodone-acetaminophen, Penicillins, Promethazine, Quetiapine, Shrimp, Sulfa antibiotics, Tape, Tramadol, Qelbree [viloxazine], and Gabapentin        REVIEW OF SYSTEMS  All systems reviewed and negative except for those discussed in HPI.       PHYSICAL EXAM    I have reviewed the triage vital signs and nursing notes.    ED Triage Vitals   Temp Heart Rate Resp BP SpO2   10/12/23 0834 10/12/23 0834 10/12/23 0840 10/12/23 0836 10/12/23 0834   98 øF (36.7 øC) 96 16 130/81 96 %      Temp src Heart Rate Source Patient Position BP Location FiO2 (%)   10/12/23 0834 10/12/23 0834 -- -- --   Tympanic Monitor          Physical Exam  GENERAL: Alert, oriented, not distressed  HENT: Mild angioedema of the lips.  Tongue mildly swollen.  Oropharynx patent.  No stridor.  Mildly muffled voice.  EYES: no scleral icterus, EOMI  CV: regular rhythm, regular rate, no murmur  RESPIRATORY: normal effort, CTA  ABDOMEN: soft, nontender  MUSCULOSKELETAL: no deformity, FROM, no calf swelling or tenderness  NEURO: alert, moves all extremities, follows commands  SKIN: warm, dry, no rash        LAB RESULTS  Recent Results (from the past 24 hour(s))   Comprehensive Metabolic Panel    Collection Time: 10/12/23  9:06 AM    Specimen: Blood   Result Value Ref Range    Glucose 95 65 - 99 mg/dL    BUN 16 6 - 20 mg/dL    Creatinine 0.69 0.57 - 1.00 mg/dL    Sodium 137 136 - 145 mmol/L    Potassium 4.1 3.5 - 5.2 mmol/L    Chloride 103 98 - 107 mmol/L    CO2 25.5 22.0 - 29.0 mmol/L    Calcium 9.1 8.6 - 10.5 mg/dL    Total Protein 7.1 6.0 - 8.5 g/dL    Albumin 4.1 3.5 - 5.2 g/dL    ALT (SGPT) 17 1 - 33 U/L    AST (SGOT) 17 1 - 32 U/L    Alkaline Phosphatase 33 (L) 39 - 117 U/L    Total Bilirubin 0.5 0.0 - 1.2 mg/dL    Globulin 3.0 gm/dL    A/G Ratio 1.4 g/dL    BUN/Creatinine Ratio 23.2 7.0 - 25.0    Anion Gap 8.5 5.0 - 15.0 mmol/L    eGFR 121.4 >60.0 mL/min/1.73   hCG, Serum, Qualitative    Collection Time: 10/12/23  9:06 AM     Specimen: Blood   Result Value Ref Range    HCG Qualitative Negative Negative   CBC Auto Differential    Collection Time: 10/12/23  9:06 AM    Specimen: Blood   Result Value Ref Range    WBC 4.93 3.40 - 10.80 10*3/mm3    RBC 5.01 3.77 - 5.28 10*6/mm3    Hemoglobin 14.4 12.0 - 15.9 g/dL    Hematocrit 43.4 34.0 - 46.6 %    MCV 86.6 79.0 - 97.0 fL    MCH 28.7 26.6 - 33.0 pg    MCHC 33.2 31.5 - 35.7 g/dL    RDW 12.4 12.3 - 15.4 %    RDW-SD 38.8 37.0 - 54.0 fl    MPV 9.6 6.0 - 12.0 fL    Platelets 200 140 - 450 10*3/mm3    Neutrophil % 48.1 42.7 - 76.0 %    Lymphocyte % 28.2 19.6 - 45.3 %    Monocyte % 17.6 (H) 5.0 - 12.0 %    Eosinophil % 5.7 0.3 - 6.2 %    Basophil % 0.2 0.0 - 1.5 %    Immature Grans % 0.2 0.0 - 0.5 %    Neutrophils, Absolute 2.37 1.70 - 7.00 10*3/mm3    Lymphocytes, Absolute 1.39 0.70 - 3.10 10*3/mm3    Monocytes, Absolute 0.87 0.10 - 0.90 10*3/mm3    Eosinophils, Absolute 0.28 0.00 - 0.40 10*3/mm3    Basophils, Absolute 0.01 0.00 - 0.20 10*3/mm3    Immature Grans, Absolute 0.01 0.00 - 0.05 10*3/mm3    nRBC 0.0 0.0 - 0.2 /100 WBC       Ordered the above labs and independently reviewed the results.        RADIOLOGY  CT Soft Tissue Neck Without Contrast    Result Date: 10/12/2023  CT SCAN OF THE NECK WITHOUT CONTRAST ON 10/12/ 2023  CLINICAL HISTORY: Allergic reaction, 24-hour history of some lip and tongue swelling and difficulty swallowing . TECHNIQUE: Spiral CT images were obtained from the tops of the petrous apices down through the sternoclavicular junction without intravenous contrast. The images were reformatted and submitted in 3 mm thick axial , sagittal and coronal CT sections with soft tissue algorithm.  COMPARISON: There are no prior neck CTs for comparison.  FINDINGS: The paranasal sinuses and the mastoid air cells and middle ear cavities are clear. The lack of intravenous contrast slightly limits evaluation of the soft tissues of the neck. Overall, the nasopharynx, oropharynx, the  hypopharynx, the true cords and subglottic airway are normal in appearance. The  thyroid gland is unremarkable.  The  lung apices are clear. The parotid, , parapharyngeal and submandibular spaces are symmetric and are normal in appearance. No pathologic lymphadenopathy is seen in the neck. The cervical spine is unremarkable.      1. Essentially normal neck CT. By history the patient had an allergic reaction and has difficulty swallowing and some lip and tongue swelling, subtle swelling of the lips and tongue oftentimes not appreciable on the neck CT and correlation with clinical findings is suggested.  Radiation dose reduction techniques were utilized, including automated exposure control and exposure modulation based on body size.        I ordered the above noted radiological studies. Reviewed by me and discussed with radiologist.  See dictation for official radiology interpretation.      MEDICATIONS GIVEN IN ER    Medications   methylPREDNISolone sodium succinate (SOLU-Medrol) injection 125 mg (125 mg Intravenous Given 10/12/23 0905)   diphenhydrAMINE (BENADRYL) injection 25 mg (25 mg Intravenous Given 10/12/23 0905)   famotidine (PEPCID) injection 20 mg (20 mg Intravenous Given 10/12/23 0905)         ADDITIONAL ORDERS CONSIDERED BUT NOT ORDERED:  Considered IM epi however patient has stable vitals without stridor.      PROGRESS, DATA ANALYSIS, CONSULTS, AND MEDICAL DECISION MAKING    All labs have been independently interpreted by myself.  All radiology studies have been independently interpreted by myself and discussed with radiologist dictating the report.   EKG's independently interpreted by myself.  Discussion below represents my analysis of pertinent findings related to patient's condition, differential diagnosis, treatment plan and final disposition.    I have discussed case with Dr. Harper, emergency room physician.  He has performed his own bedside examination and agrees with treatment  plan.    ED Course as of 10/12/23 1627   Thu Oct 12, 2023   0854 Patient presents with approximate 24-hour history of angioedema involving the lips and tongue.  Oropharynx is patent.  She has stable vitals without stridor.  Patient has a history of multiple significant allergies.  Plan to monitor and give steroids, antihistamines. [EE]   0915 WBC: 4.93 [EE]   0915 Hemoglobin: 14.4 [EE]   0933 HCG Qualitative: Negative [EE]   0934 Creatinine: 0.69 [EE]   1034 Recheck of patient.  She is resting quietly.  Stable vitals.  No stridor. [EE]   1440 Recheck of patient.  She states her symptoms are starting to come back.  She still has a muffled voice.  She had difficulty swallowing her water.  Plan to obtain a CT of soft tissue neck and likely admit. [EE]   1612 CT imaging of the soft tissues of the neck independently interpreted myself shows no evidence of prevertebral swelling or oropharyngeal swelling.  I discussed these findings with Dr. Kumari. [EE]   1627 I discussed case with Renetta Cochran, physician assistant in the observation unit.  She agrees to admit the patient. [EE]      ED Course User Index  [EE] Boogie Abraham PA       AS OF 16:27 EDT VITALS:    BP - 101/61  HR - 87  TEMP - 98 øF (36.7 øC) (Tympanic)  O2 SATS - 98%        DIAGNOSIS  Final diagnoses:   Allergic reaction, initial encounter   Angioedema, initial encounter         DISPOSITION  Admitted      Dictated utilizing Dragon dictation     Boogie Abraham PA  10/12/23 1627

## 2023-10-12 NOTE — ED NOTES
.Nursing report ED to floor  Anayeli Burdick  28 y.o.  female    HPI :   Chief Complaint   Patient presents with    Allergic Reaction       Admitting doctor:   Heraclio Hernandez MD    Admitting diagnosis:   The primary encounter diagnosis was Allergic reaction, initial encounter. A diagnosis of Angioedema, initial encounter was also pertinent to this visit.    Code status:   Current Code Status       Date Active Code Status Order ID Comments User Context       10/12/2023 1629 CPR (Attempt to Resuscitate) [id number]  Renetta Cochran PA ED        Question Answer    Code Status (Patient has no pulse and is not breathing) CPR (Attempt to Resuscitate)    Medical Interventions (Patient has pulse or is breathing) Full Support    Level Of Support Discussed With Patient                    Allergies:   Adhesive tape, Aripiprazole, Aspirin, Bee venom, Diclofenac, Diclofenac potassium, Diflucan [fluconazole], Ibuprofen, Iodinated contrast media, Iodine, Keflex [cephalexin], Latex, Levofloxacin, Oxycodone-acetaminophen, Penicillins, Promethazine, Quetiapine, Shrimp, Sulfa antibiotics, Tape, Tramadol, Qelbree [viloxazine], and Gabapentin    Isolation:   No active isolations    Intake and Output  No intake or output data in the 24 hours ending 10/12/23 1636    Weight:       10/12/23  0834   Weight: 75.8 kg (167 lb)       Most recent vitals:   Vitals:    10/12/23 0836 10/12/23 0840 10/12/23 1216 10/12/23 1407   BP: 130/81  107/69 101/61   BP Location:   Left arm    Patient Position:   Lying    Pulse:   84 87   Resp:  16 16 16   Temp:       TempSrc:       SpO2:   98% 98%   Weight:       Height:           Active LDAs/IV Access:   Lines, Drains & Airways       Active LDAs       Name Placement date Placement time Site Days    Peripheral IV Left Antecubital --  --  Antecubital  --                    Labs (abnormal labs have a star):   Labs Reviewed   COMPREHENSIVE METABOLIC PANEL - Abnormal; Notable for the following components:        Result Value    Alkaline Phosphatase 33 (*)     All other components within normal limits    Narrative:     GFR Normal >60  Chronic Kidney Disease <60  Kidney Failure <15     CBC WITH AUTO DIFFERENTIAL - Abnormal; Notable for the following components:    Monocyte % 17.6 (*)     All other components within normal limits   HCG, SERUM, QUALITATIVE - Normal   CBC AND DIFFERENTIAL    Narrative:     The following orders were created for panel order CBC & Differential.  Procedure                               Abnormality         Status                     ---------                               -----------         ------                     CBC Auto Differential[[id number]]        Abnormal            Final result                 Please view results for these tests on the individual orders.       EKG:   No orders to display       Meds given in ED:   Medications   diphenhydrAMINE (BENADRYL) injection 25 mg (has no administration in time range)   famotidine (PEPCID) injection 20 mg (has no administration in time range)   sodium chloride 0.9 % flush 10 mL (has no administration in time range)   sodium chloride 0.9 % flush 10 mL (has no administration in time range)   sodium chloride 0.9 % infusion 40 mL (has no administration in time range)   nitroglycerin (NITROSTAT) SL tablet 0.4 mg (has no administration in time range)   methylPREDNISolone sodium succinate (SOLU-Medrol) injection 125 mg (125 mg Intravenous Given 10/12/23 0905)   diphenhydrAMINE (BENADRYL) injection 25 mg (25 mg Intravenous Given 10/12/23 0905)   famotidine (PEPCID) injection 20 mg (20 mg Intravenous Given 10/12/23 0905)       Imaging results:  CT Soft Tissue Neck Without Contrast    Result Date: 10/12/2023  1. Essentially normal neck CT. By history the patient had an allergic reaction and has difficulty swallowing and some lip and tongue swelling, subtle swelling of the lips and tongue oftentimes not appreciable on the neck CT and correlation with  clinical findings is suggested.  Radiation dose reduction techniques were utilized, including automated exposure control and exposure modulation based on body size.        Ambulatory status:   - ad nae    Social issues:   Social History     Socioeconomic History    Marital status:      Spouse name: Hector Burdick    Number of children: 0    Highest education level: Bachelor's degree (e.g., BA, AB, BS)   Tobacco Use    Smoking status: Never    Smokeless tobacco: Never    Tobacco comments:     Exposed to second hand smoke as a child   Vaping Use    Vaping Use: Never used   Substance and Sexual Activity    Alcohol use: Not Currently     Comment: only while on vacation    Drug use: Never    Sexual activity: Yes     Partners: Male     Birth control/protection: Implant     Comment: [employer]       NIH Stroke Scale:       Shikha Giles RN  10/12/23 16:36 EDT

## 2023-10-12 NOTE — ED TRIAGE NOTES
Patient to ER via car from home for allergic reaction  Patient has tongue and lip swelling x yesterday    Patient in NAD at this time no SOA

## 2023-10-13 VITALS
TEMPERATURE: 98.3 F | BODY MASS INDEX: 26.8 KG/M2 | DIASTOLIC BLOOD PRESSURE: 59 MMHG | HEIGHT: 64 IN | OXYGEN SATURATION: 100 % | WEIGHT: 157 LBS | SYSTOLIC BLOOD PRESSURE: 100 MMHG | RESPIRATION RATE: 18 BRPM | HEART RATE: 90 BPM

## 2023-10-13 LAB
ANION GAP SERPL CALCULATED.3IONS-SCNC: 8.8 MMOL/L (ref 5–15)
BUN SERPL-MCNC: 15 MG/DL (ref 6–20)
BUN/CREAT SERPL: 22.7 (ref 7–25)
CALCIUM SPEC-SCNC: 8.9 MG/DL (ref 8.6–10.5)
CHLORIDE SERPL-SCNC: 105 MMOL/L (ref 98–107)
CO2 SERPL-SCNC: 22.2 MMOL/L (ref 22–29)
CREAT SERPL-MCNC: 0.66 MG/DL (ref 0.57–1)
DEPRECATED RDW RBC AUTO: 38.8 FL (ref 37–54)
EGFRCR SERPLBLD CKD-EPI 2021: 122.7 ML/MIN/1.73
ERYTHROCYTE [DISTWIDTH] IN BLOOD BY AUTOMATED COUNT: 12.3 % (ref 12.3–15.4)
GLUCOSE SERPL-MCNC: 184 MG/DL (ref 65–99)
HCT VFR BLD AUTO: 41.8 % (ref 34–46.6)
HGB BLD-MCNC: 13.9 G/DL (ref 12–15.9)
MCH RBC QN AUTO: 28.8 PG (ref 26.6–33)
MCHC RBC AUTO-ENTMCNC: 33.3 G/DL (ref 31.5–35.7)
MCV RBC AUTO: 86.7 FL (ref 79–97)
PLATELET # BLD AUTO: 211 10*3/MM3 (ref 140–450)
PMV BLD AUTO: 10 FL (ref 6–12)
POTASSIUM SERPL-SCNC: 4.3 MMOL/L (ref 3.5–5.2)
RBC # BLD AUTO: 4.82 10*6/MM3 (ref 3.77–5.28)
SODIUM SERPL-SCNC: 136 MMOL/L (ref 136–145)
WBC NRBC COR # BLD: 9.34 10*3/MM3 (ref 3.4–10.8)

## 2023-10-13 PROCEDURE — 96376 TX/PRO/DX INJ SAME DRUG ADON: CPT

## 2023-10-13 PROCEDURE — 85027 COMPLETE CBC AUTOMATED: CPT | Performed by: PHYSICIAN ASSISTANT

## 2023-10-13 PROCEDURE — 80048 BASIC METABOLIC PNL TOTAL CA: CPT | Performed by: PHYSICIAN ASSISTANT

## 2023-10-13 PROCEDURE — 63710000001 PREDNISONE PER 1 MG: Performed by: PHYSICIAN ASSISTANT

## 2023-10-13 PROCEDURE — 25010000002 METHYLPREDNISOLONE PER 125 MG: Performed by: PHYSICIAN ASSISTANT

## 2023-10-13 PROCEDURE — G0378 HOSPITAL OBSERVATION PER HR: HCPCS

## 2023-10-13 PROCEDURE — 25010000002 DIPHENHYDRAMINE PER 50 MG: Performed by: PHYSICIAN ASSISTANT

## 2023-10-13 RX ORDER — DIPHENHYDRAMINE HCL 25 MG
25 TABLET ORAL EVERY 6 HOURS PRN
Qty: 28 TABLET | Refills: 0 | Status: SHIPPED | OUTPATIENT
Start: 2023-10-13 | End: 2023-10-20

## 2023-10-13 RX ORDER — EPINEPHRINE 0.3 MG/.3ML
0.3 INJECTION SUBCUTANEOUS ONCE
Qty: 2 EACH | Refills: 0 | Status: SHIPPED | OUTPATIENT
Start: 2023-10-13 | End: 2023-10-14

## 2023-10-13 RX ORDER — FAMOTIDINE 20 MG/1
20 TABLET, FILM COATED ORAL 2 TIMES DAILY
Qty: 14 TABLET | Refills: 0 | Status: SHIPPED | OUTPATIENT
Start: 2023-10-13 | End: 2023-10-20

## 2023-10-13 RX ORDER — PREDNISONE 20 MG/1
60 TABLET ORAL ONCE
Status: COMPLETED | OUTPATIENT
Start: 2023-10-13 | End: 2023-10-13

## 2023-10-13 RX ORDER — PREDNISONE 20 MG/1
TABLET ORAL
Qty: 9 TABLET | Refills: 0 | Status: SHIPPED | OUTPATIENT
Start: 2023-10-14 | End: 2023-10-21

## 2023-10-13 RX ADMIN — PREDNISONE 60 MG: 20 TABLET ORAL at 08:39

## 2023-10-13 RX ADMIN — DIPHENHYDRAMINE HYDROCHLORIDE 25 MG: 50 INJECTION, SOLUTION INTRAMUSCULAR; INTRAVENOUS at 04:35

## 2023-10-13 RX ADMIN — DIPHENHYDRAMINE HYDROCHLORIDE 25 MG: 50 INJECTION, SOLUTION INTRAMUSCULAR; INTRAVENOUS at 11:44

## 2023-10-13 RX ADMIN — METHYLPREDNISOLONE SODIUM SUCCINATE 60 MG: 125 INJECTION, POWDER, FOR SOLUTION INTRAMUSCULAR; INTRAVENOUS at 04:35

## 2023-10-13 RX ADMIN — Medication 10 ML: at 08:39

## 2023-10-13 RX ADMIN — FAMOTIDINE 20 MG: 10 INJECTION INTRAVENOUS at 08:39

## 2023-10-13 NOTE — PLAN OF CARE
Goal Outcome Evaluation:   Pt d/c via private vehicle. Mother at bedside. Follow up instructions given. No further questions/complaints at this time. IV removed. Belongings returned. Meds delivered from pharmacy.

## 2023-10-13 NOTE — DISCHARGE SUMMARY
ED OBSERVATION PROGRESS/DISCHARGE SUMMARY    Date of Admission: 10/12/2023   LOS: 0 days   PCP: Shana Garcia APRN    Subjective patient reports this morning she feels like her lips are still swollen.  She is handling her own secretions and tolerating p.o. intake.    Hospital Outcome:   28-year-old female admitted to the observation unit with a complaint of swelling of her tongue and the feeling of difficulty swallowing.  She states that this occurred after taking Diflucan tablet Wednesday evening.  Lab work done in the emergency department is unremarkable.      Patient was monitored overnight for any sort of rebound anaphylaxis.  She is tolerating p.o. without issue.  She will be discharged with EpiPen, prednisone taper, Pepcid, and Benadryl as needed.  Patient states that she is already established with an allergist, she was instructed to follow-up with them next week.    ROS:    General: no fevers, chills  Respiratory: no cough, dyspnea  Cardiovascular: no chest pain, palpitations  Abdomen: No abdominal pain, nausea, vomiting, or diarrhea  Neurologic: No focal weakness    Objective   Physical Exam:  I have reviewed the vital signs.  Temp:  [97.9 øF (36.6 øC)-99 øF (37.2 øC)] 97.9 øF (36.6 øC)  Heart Rate:  [84-96] 96  Resp:  [16-18] 18  BP: ()/(55-81) 109/55  General Appearance:    Alert, cooperative, no distress  Head:    Normocephalic, atraumatic  ENT: Oropharynx patent, soft palate rises adequately, no edema to floor of mouth, no oral swelling noted  Eyes:    Sclerae anicteric  Neck:   Supple, no mass  Lungs: Clear to auscultation bilaterally, respirations unlabored  Heart: Regular rate and rhythm, S1 and S2 normal, no murmur, rub or gallop  Abdomen:  Soft, non-tender, bowel sounds active, nondistended  Extremities: No clubbing, cyanosis, or edema to lower extremities  Pulses:  2+ and symmetric in distal lower extremities  Skin: No rashes   Neurologic: Oriented x3, Normal strength to  extremities    Results Review:    I have reviewed the labs, radiology results and diagnostic studies.    Results from last 7 days   Lab Units 10/12/23  0906   WBC 10*3/mm3 4.93   HEMOGLOBIN g/dL 14.4   HEMATOCRIT % 43.4   PLATELETS 10*3/mm3 200     Results from last 7 days   Lab Units 10/12/23  0906   SODIUM mmol/L 137   POTASSIUM mmol/L 4.1   CHLORIDE mmol/L 103   CO2 mmol/L 25.5   BUN mg/dL 16   CREATININE mg/dL 0.69   CALCIUM mg/dL 9.1   BILIRUBIN mg/dL 0.5   ALK PHOS U/L 33*   ALT (SGPT) U/L 17   AST (SGOT) U/L 17   GLUCOSE mg/dL 95     Imaging Results (Last 24 Hours)       Procedure Component Value Units Date/Time    CT Soft Tissue Neck Without Contrast [[id number]] Collected: 10/12/23 1611     Updated: 10/12/23 1611    Narrative:      CT SCAN OF THE NECK WITHOUT CONTRAST ON 10/12/ 2023     CLINICAL HISTORY: Allergic reaction, 24-hour history of some lip and  tongue swelling and difficulty swallowing  .  TECHNIQUE: Spiral CT images were obtained from the tops of the petrous  apices down through the sternoclavicular junction without intravenous  contrast. The images were reformatted and submitted in 3 mm thick axial  , sagittal and coronal CT sections with soft tissue algorithm.     COMPARISON: There are no prior neck CTs for comparison.     FINDINGS: The paranasal sinuses and the mastoid air cells and middle ear  cavities are clear. The lack of intravenous contrast slightly limits  evaluation of the soft tissues of the neck. Overall, the nasopharynx,  oropharynx, the hypopharynx, the true cords and subglottic airway are  normal in appearance. The  thyroid gland is unremarkable.  The  lung  apices are clear. The parotid, , parapharyngeal and  submandibular spaces are symmetric and are normal in appearance. No  pathologic lymphadenopathy is seen in the neck. The cervical spine is  unremarkable.       Impression:      1. Essentially normal neck CT. By history the patient had an allergic  reaction and has  difficulty swallowing and some lip and tongue swelling,  subtle swelling of the lips and tongue oftentimes not appreciable on the  neck CT and correlation with clinical findings is suggested.     Radiation dose reduction techniques were utilized, including automated  exposure control and exposure modulation based on body size.                  I have reviewed the medications.  ---------------------------------------------------------------------------------------------  Assessment & Plan   Assessment/Problem List    Allergic reaction      Plan:    Allergic reaction  -Patient took 1 dose of Diflucan last night and subsequently had tongue swelling and difficulty swallowing  -CT soft tissue neck in ED unremarkable  -Continue IV Benadryl and IV steroids overnight, repeat IV Pepcid in a.m.  -Patient has been tolerating p.o. without issue  -Discharged with EpiPen, prednisone taper, Pepcid, and Benadryl  -Follow-up with established allergist    Disposition: Home    Follow-up after Discharge: PCP, allergist    This note will serve as a discharge summary    TAY Pacheco 10/13/23 07:54 EDT    I have worn appropriate PPE during this patient encounter, sanitized my hands both with entering and exiting patient's room.      32 minutes has been spent by [facility] providers in the care of this patient while under observation status

## 2023-10-13 NOTE — PROGRESS NOTES
The MICHAEL and I have discussed this patient's history, physical exam and treatment plan.  I provided a substantive portion of the care of this patient.  I have reviewed the documentation and personally had a face to face interaction with the patient and personally performed the physical exam, in its entirety.  I affirm the documentation and agree with the treatment and plan.  The following describes my personal findings.      The patient is admitted for further evaluation of swelling to her mouth and throat after taking a dose of Diflucan.  Symptoms persistent in the ER after medications so admitted for observation and monitoring of airway.    Comprehensive Physical exam:  Patient is nontoxic appearing oriented, conversant, awake, alert  HEENT: normocephalic, atraumatic, voice normal, oropharynx within normal limits, no oral swelling noted  Neck: no goiter, no pain with ROM  Pulmonary: Nontachypneic, breath sounds are well bilaterally, no no wheezes  cardiovascular: Nontachycardic  Abdomen: Soft, nontender  musculoskeletal: Good range of motion, pulse, sensation x4  Neuro/psychiatric:calm, appropriate, cooperative  Skin:warm, dry      Plan:      Allergic reaction  -Patient took 1 dose of Diflucan 2 nights ago and subsequently had tongue swelling and difficulty swallowing  -CT soft tissue neck in ED unremarkable  -Continue IV Benadryl and IV steroids overnight, repeat IV Pepcid in a.m., continued at dc with epi pen and follow with allergist   - jonna po well  -Monitor closely

## 2023-10-13 NOTE — DISCHARGE INSTRUCTIONS
Follow up with your allergist next week.  Medications as prescribed.  Keep EpiPen with you at all times.  Return to the emergency department with worsening symptoms, uncontrolled pain, inability to tolerate oral liquids, fever greater than 101øF not controlled by Tylenol or as needed with emergent concerns.

## 2023-11-08 DIAGNOSIS — F90.0 ADHD, PREDOMINANTLY INATTENTIVE TYPE: Chronic | ICD-10-CM

## 2023-11-08 RX ORDER — DEXTROAMPHETAMINE SACCHARATE, AMPHETAMINE ASPARTATE, DEXTROAMPHETAMINE SULFATE AND AMPHETAMINE SULFATE 2.5; 2.5; 2.5; 2.5 MG/1; MG/1; MG/1; MG/1
10 TABLET ORAL 2 TIMES DAILY
Qty: 60 TABLET | Refills: 0 | Status: CANCELLED | OUTPATIENT
Start: 2023-11-08 | End: 2024-11-07

## 2023-11-09 RX ORDER — DEXTROAMPHETAMINE SACCHARATE, AMPHETAMINE ASPARTATE, DEXTROAMPHETAMINE SULFATE AND AMPHETAMINE SULFATE 2.5; 2.5; 2.5; 2.5 MG/1; MG/1; MG/1; MG/1
10 TABLET ORAL 2 TIMES DAILY
Qty: 60 TABLET | Refills: 0 | Status: SHIPPED | OUTPATIENT
Start: 2023-11-09 | End: 2024-11-08

## 2023-11-15 ENCOUNTER — LAB (OUTPATIENT)
Dept: LAB | Facility: HOSPITAL | Age: 28
End: 2023-11-15
Payer: COMMERCIAL

## 2023-11-15 ENCOUNTER — OFFICE VISIT (OUTPATIENT)
Dept: PSYCHIATRY | Facility: CLINIC | Age: 28
End: 2023-11-15
Payer: COMMERCIAL

## 2023-11-15 ENCOUNTER — TRANSCRIBE ORDERS (OUTPATIENT)
Dept: ADMINISTRATIVE | Facility: HOSPITAL | Age: 28
End: 2023-11-15
Payer: COMMERCIAL

## 2023-11-15 DIAGNOSIS — T50.905D DRUG-INDUCED ANAPHYLAXIS, SUBSEQUENT ENCOUNTER: ICD-10-CM

## 2023-11-15 DIAGNOSIS — T78.2XXA ANAPHYLACTIC SHOCK: ICD-10-CM

## 2023-11-15 DIAGNOSIS — T78.2XXD DRUG-INDUCED ANAPHYLAXIS, SUBSEQUENT ENCOUNTER: ICD-10-CM

## 2023-11-15 DIAGNOSIS — L50.1 IDIOPATHIC URTICARIA: Primary | ICD-10-CM

## 2023-11-15 DIAGNOSIS — F33.1 MAJOR DEPRESSIVE DISORDER, RECURRENT EPISODE, MODERATE: Primary | ICD-10-CM

## 2023-11-15 DIAGNOSIS — L50.1 IDIOPATHIC URTICARIA: ICD-10-CM

## 2023-11-15 LAB
C3 SERPL-MCNC: 104 MG/DL (ref 82–167)
C4 SERPL-MCNC: 23 MG/DL (ref 14–44)
T4 FREE SERPL-MCNC: 1.6 NG/DL (ref 0.93–1.7)
TSH SERPL DL<=0.05 MIU/L-ACNC: 1.31 UIU/ML (ref 0.27–4.2)

## 2023-11-15 PROCEDURE — 86160 COMPLEMENT ANTIGEN: CPT

## 2023-11-15 PROCEDURE — 84443 ASSAY THYROID STIM HORMONE: CPT

## 2023-11-15 PROCEDURE — 86003 ALLG SPEC IGE CRUDE XTRC EA: CPT

## 2023-11-15 PROCEDURE — 86008 ALLG SPEC IGE RECOMB EA: CPT

## 2023-11-15 PROCEDURE — 84439 ASSAY OF FREE THYROXINE: CPT

## 2023-11-15 PROCEDURE — 36415 COLL VENOUS BLD VENIPUNCTURE: CPT

## 2023-11-15 PROCEDURE — 83520 IMMUNOASSAY QUANT NOS NONAB: CPT

## 2023-11-15 NOTE — PROGRESS NOTES
Date: November 15, 2023  Time In: 0800  Time Out: 0839      PROGRESS NOTE  Data:  Anayeli Burdick is a 28 y.o. female who presents today for individual therapy session at [facility] with ELISHA Rosenberg, JACQUELINE.     Patient Chief Complaint: Follow-up for depression and PTSD    Clinical Maneuvering/Intervention: Anayeli is pleasant, alert and oriented to person place and time.  She was recently hospitalized for having allergic reactions and has been doing testing.  She is fearful of what these tests will show because she is already a picky eater she is concerned that this will limit even further the foods that she eats.  She spoke about being tired of all the things that she does for her family.  This has decreased her mood where she feels down more and more irritable.  She is open to starting EMDR after her divorce    Assisted patient in processing above session content; acknowledged and normalized patient’s thoughts, feelings, and concerns. Rationalized patient thought process regarding applying herself as a priority with her own physical and emotional self-care. Discussed triggers associated with patient's depression. Also discussed coping skills for patient to implement such as continuing skills are developed.    Allowed patient to freely discuss issues without interruption or judgment. Provided safe, confidential environment to facilitate the development of positive therapeutic relationship and encourage open, honest communication. Assisted patient in identifying risk factors which would indicate the need for higher level of care including thoughts to harm self or others and/or self-harming behavior and encouraged patient to contact this office, call 911, or present to the nearest emergency room should any of these events occur. Discussed crisis intervention services and means to access. Patient adamantly and convincingly denies current suicidal or homicidal ideation or perceptual  disturbance.    Assessment   Patient appears to maintain relative stability as compared to their baseline. However, patient continues to struggle with depression and PTSD which continues to cause impairment in important areas of functioning. A result, they can be reasonably expected to continue to benefit from treatment and would likely be at increased risk for decompensation otherwise.    Mental Status Exam:   Hygiene:   good  Cooperation:  Cooperative  Eye Contact:  Good  Psychomotor Behavior:  Appropriate  Affect:  Appropriate  Mood: depressed, anxious  Speech:  Normal  Thought Process:  Goal directed and Linear  Thought Content:  Normal  Suicidal:  None  Homicidal:  None  Hallucinations:  None  Delusion:  None  Memory:  Intact  Orientation:  Person, Place, Time and Situation  Reliability:  good  Insight:  Good  Judgement:  Good  Impulse Control:  Fair  Physical/Medical Issues:  See diagnosis list     PHQ-Score Total:  PHQ-9 Total Score: PHQ-9 Depression Screening  Little interest or pleasure in doing things? 0-->not at all   Feeling down, depressed, or hopeless? 1-->several days   Trouble falling or staying asleep, or sleeping too much? 3-->nearly every day   Feeling tired or having little energy? 3-->nearly every day   Poor appetite or overeating? 2-->more than half the days   Feeling bad about yourself - or that you are a failure or have let yourself or your family down? 2-->more than half the days   Trouble concentrating on things, such as reading the newspaper or watching television? 3-->nearly every day   Moving or speaking so slowly that other people could have noticed? Or the opposite - being so fidgety or restless that you have been moving around a lot more than usual? 2-->more than half the days   Thoughts that you would be better off dead, or of hurting yourself in some way? 0-->not at all   PHQ-9 Total Score 16   If you checked off any problems, how difficult have these problems made it for you to do  your work, take care of things at home, or get along with other people?        ADA-7 Total Score:   Over the last two weeks, how often have you been bothered by the following problems?  Feeling nervous, anxious or on edge: More than half the days  Not being able to stop or control worrying: More than half the days  Worrying too much about different things: Nearly every day  Trouble Relaxing: More than half the days  Being so restless that it is hard to sit still: Several days  Becoming easily annoyed or irritable: Nearly every day  Feeling afraid as if something awful might happen: Not at all  ADA 7 Total Score: 13     Patient's Support Network Includes:  mother     Functional Status: Moderate impairment      Progress toward goal: Not at goal     Prognosis: Good with Ongoing Treatment           Plan     Resources: Patient was provided with the following community resources: None at this time    Patient will continue in individual outpatient therapy with focus on improved functioning and coping skills, maintaining stability, and avoiding decompensation and the need for higher level of care.    Patient will adhere to any medication regimens as prescribed and report any side effects. Patient will contact this office, call 911 or present to the nearest emergency room should suicidal or homicidal ideations occur. Provide cognitive behavioral therapy and solution focused therapy to improve functioning, maintain stability and avoid decompensation and the need for higher level of care.     Return at first available appointment or earlier if symptoms worsen or fail to improve.           VISIT DIAGNOSIS:     ICD-10-CM ICD-9-CM   1. Major depressive disorder, recurrent episode, moderate  F33.1 296.32            This document has been electronically signed by ELISHA Rosenberg, JACQUELINE   November 15, 2023 08:48 EST      Part of this note may be an electronic transcription/translation of spoken language to printed text using the  Dragon Dictation System.

## 2023-11-18 LAB
ALPHA-GAL IGE QN: <0.1 KU/L
BAKER'S YEAST IGE QN: <0.1 KU/L
SCALLOP IGE QN: <0.1 KU/L
SHRIMP IGE QN: 0.12 KU/L
TRYPTASE SERPL-MCNC: 4.7 UG/L (ref 2.2–13.2)
WHEAT IGE QN: <0.1 KU/L

## 2023-11-22 LAB
BARLEY IGE QN: <0.1 KU/L
CRAB IGE QN: <0.1 KU/L
LOBSTER IGE QN: <0.1 KU/L

## 2023-11-27 LAB
HOP IGE QN: <0.1 KU/L
OYSTER IGE QN: <0.1 KU/L
PAPRIKA IGE QN: <0.1 KU/L

## 2023-12-07 DIAGNOSIS — F90.0 ADHD, PREDOMINANTLY INATTENTIVE TYPE: Chronic | ICD-10-CM

## 2023-12-07 RX ORDER — SEMAGLUTIDE 2.4 MG/.75ML
2.4 INJECTION, SOLUTION SUBCUTANEOUS WEEKLY
Qty: 3 ML | Refills: 4 | Status: CANCELLED | OUTPATIENT
Start: 2023-12-07

## 2023-12-07 RX ORDER — DEXTROAMPHETAMINE SACCHARATE, AMPHETAMINE ASPARTATE, DEXTROAMPHETAMINE SULFATE AND AMPHETAMINE SULFATE 2.5; 2.5; 2.5; 2.5 MG/1; MG/1; MG/1; MG/1
10 TABLET ORAL 2 TIMES DAILY
Qty: 60 TABLET | Refills: 0 | Status: CANCELLED | OUTPATIENT
Start: 2023-12-07 | End: 2024-12-06

## 2023-12-08 DIAGNOSIS — F90.0 ADHD, PREDOMINANTLY INATTENTIVE TYPE: Chronic | ICD-10-CM

## 2023-12-08 RX ORDER — SEMAGLUTIDE 2.4 MG/.75ML
2.4 INJECTION, SOLUTION SUBCUTANEOUS WEEKLY
Qty: 3 ML | Refills: 4 | Status: CANCELLED | OUTPATIENT
Start: 2023-12-07

## 2023-12-08 RX ORDER — DEXTROAMPHETAMINE SACCHARATE, AMPHETAMINE ASPARTATE, DEXTROAMPHETAMINE SULFATE AND AMPHETAMINE SULFATE 2.5; 2.5; 2.5; 2.5 MG/1; MG/1; MG/1; MG/1
10 TABLET ORAL 2 TIMES DAILY
Qty: 60 TABLET | Refills: 0 | Status: SHIPPED | OUTPATIENT
Start: 2023-12-08 | End: 2024-12-07

## 2023-12-09 RX ORDER — SEMAGLUTIDE 2.4 MG/.75ML
2.4 INJECTION, SOLUTION SUBCUTANEOUS WEEKLY
Qty: 3 ML | Refills: 4 | Status: CANCELLED | OUTPATIENT
Start: 2023-12-07

## 2024-01-05 ENCOUNTER — OFFICE VISIT (OUTPATIENT)
Dept: PSYCHIATRY | Facility: CLINIC | Age: 29
End: 2024-01-05
Payer: COMMERCIAL

## 2024-01-05 DIAGNOSIS — F90.0 ADHD, PREDOMINANTLY INATTENTIVE TYPE: Chronic | ICD-10-CM

## 2024-01-05 DIAGNOSIS — F33.1 MAJOR DEPRESSIVE DISORDER, RECURRENT EPISODE, MODERATE: Primary | Chronic | ICD-10-CM

## 2024-01-05 DIAGNOSIS — F43.10 PTSD (POST-TRAUMATIC STRESS DISORDER): Chronic | ICD-10-CM

## 2024-01-05 DIAGNOSIS — Z79.899 ENCOUNTER FOR LONG-TERM (CURRENT) USE OF OTHER MEDICATIONS: ICD-10-CM

## 2024-01-05 RX ORDER — VORTIOXETINE 20 MG/1
20 TABLET, FILM COATED ORAL
Qty: 90 TABLET | Refills: 1 | Status: SHIPPED | OUTPATIENT
Start: 2024-01-05

## 2024-01-05 RX ORDER — DEXTROAMPHETAMINE SACCHARATE, AMPHETAMINE ASPARTATE, DEXTROAMPHETAMINE SULFATE AND AMPHETAMINE SULFATE 2.5; 2.5; 2.5; 2.5 MG/1; MG/1; MG/1; MG/1
10 TABLET ORAL 2 TIMES DAILY
Qty: 60 TABLET | Refills: 0 | Status: SHIPPED | OUTPATIENT
Start: 2024-01-05 | End: 2025-01-04
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
## 2024-01-08 RX ORDER — SEMAGLUTIDE 2.4 MG/.75ML
2.4 INJECTION, SOLUTION SUBCUTANEOUS WEEKLY
Qty: 3 ML | Refills: 4 | OUTPATIENT
Start: 2024-01-08

## 2024-01-15 RX ORDER — SEMAGLUTIDE 2.4 MG/.75ML
2.4 INJECTION, SOLUTION SUBCUTANEOUS WEEKLY
Qty: 3 ML | Refills: 4 | Status: SHIPPED | OUTPATIENT
Start: 2024-01-15

## 2024-01-17 NOTE — PROGRESS NOTES
Date: January 18, 2024  Time In: 0800  Time Out: 0854      PROGRESS NOTE  Data:  Anayeli Burdick is a 28 y.o. female who presents today for individual therapy session at [facility] with ELISHA Rosenberg, JACQUELINE.     Patient Chief Complaint: Follow-up for depression and PTSD    Clinical Maneuvering/Intervention: Anayeli is pleasant, alert and oriented to person place and time.  He said the holidays were stressful.  She continues to feel like she is having to do everything on her own.  She talked about not liking herself with her top 3 things that she worries too much, gives too much importance to all things including things that are not important, and beats herself up for not accepting things that she needs to accept.  She also identified negative core beliefs of if I do not have control of everything that I am lazy person.  She continues to deal with her divorce and is hoping that this will be final soon.    Assisted patient in processing above session content; acknowledged and normalized patient’s thoughts, feelings, and concerns. Rationalized patient thought process regarding identifying what she has control over and what she does not and only dealing with what she can truly manage.  Managing events over controlling them. Discussed triggers associated with patient's depression. Also discussed coping skills for patient to implement such as continuing with skills already built.    Allowed patient to freely discuss issues without interruption or judgment. Provided safe, confidential environment to facilitate the development of positive therapeutic relationship and encourage open, honest communication. Assisted patient in identifying risk factors which would indicate the need for higher level of care including thoughts to harm self or others and/or self-harming behavior and encouraged patient to contact this office, call 911, or present to the nearest emergency room should any of these  events occur. Discussed crisis intervention services and means to access. Patient adamantly and convincingly denies current suicidal or homicidal ideation or perceptual disturbance.    Assessment   Patient appears to maintain relative stability as compared to their baseline. However, patient continues to struggle with depression and PTSD which continues to cause impairment in important areas of functioning. A result, they can be reasonably expected to continue to benefit from treatment and would likely be at increased risk for decompensation otherwise.    Mental Status Exam:   Hygiene:   good  Cooperation:  Cooperative  Eye Contact:  Good  Psychomotor Behavior:  Appropriate  Affect:  Appropriate  Mood: depressed, anxious  Speech:  Normal  Thought Process:  Goal directed and Linear  Thought Content:  Normal  Suicidal:  None  Homicidal:  None  Hallucinations:  None  Delusion:  None  Memory:  Intact  Orientation:  Person, Place, Time and Situation  Reliability:  good  Insight:  Good  Judgement:  Good  Impulse Control:  Fair  Physical/Medical Issues:  See diagnosis list     PHQ-Score Total:  PHQ-9 Total Score: PHQ-9 Depression Screening  Little interest or pleasure in doing things? 1-->several days   Feeling down, depressed, or hopeless? 0-->not at all   Trouble falling or staying asleep, or sleeping too much? 3-->nearly every day   Feeling tired or having little energy? 2-->more than half the days   Poor appetite or overeating? 3-->nearly every day   Feeling bad about yourself - or that you are a failure or have let yourself or your family down? 3-->nearly every day   Trouble concentrating on things, such as reading the newspaper or watching television? 1-->several days   Moving or speaking so slowly that other people could have noticed? Or the opposite - being so fidgety or restless that you have been moving around a lot more than usual? 2-->more than half the days   Thoughts that you would be better off dead, or of  hurting yourself in some way? 0-->not at all   PHQ-9 Total Score 15   If you checked off any problems, how difficult have these problems made it for you to do your work, take care of things at home, or get along with other people?        ADA-7 Total Score:   Over the last two weeks, how often have you been bothered by the following problems?  Feeling nervous, anxious or on edge: More than half the days  Not being able to stop or control worrying: Nearly every day  Worrying too much about different things: Nearly every day  Trouble Relaxing: Nearly every day  Being so restless that it is hard to sit still: More than half the days  Becoming easily annoyed or irritable: More than half the days  Feeling afraid as if something awful might happen: Nearly every day  ADA 7 Total Score: 18     Patient's Support Network Includes:  mother     Functional Status: Moderate impairment      Progress toward goal: Not at goal     Prognosis: Good with Ongoing Treatment        Plan     Resources: Patient was provided with the following community resources: None at this time     Patient will continue in individual outpatient therapy with focus on improved functioning and coping skills, maintaining stability, and avoiding decompensation and the need for higher level of care.    Patient will adhere to any medication regimens as prescribed and report any side effects. Patient will contact this office, call 911 or present to the nearest emergency room should suicidal or homicidal ideations occur. Provide cognitive behavioral therapy and solution focused therapy to improve functioning, maintain stability and avoid decompensation and the need for higher level of care.     Return at first available appointment or earlier if symptoms worsen or fail to improve.           VISIT DIAGNOSIS:     ICD-10-CM ICD-9-CM   1. Major depressive disorder, recurrent episode, moderate  F33.1 296.32   2. PTSD (post-traumatic stress disorder)  F43.10 309.81             This document has been electronically signed by ELISHA Rosenberg, BUSTERW   January 18, 2024 08:59 EST      Part of this note may be an electronic transcription/translation of spoken language to printed text using the Dragon Dictation System.

## 2024-01-18 ENCOUNTER — OFFICE VISIT (OUTPATIENT)
Dept: PSYCHIATRY | Facility: CLINIC | Age: 29
End: 2024-01-18
Payer: COMMERCIAL

## 2024-01-18 DIAGNOSIS — F43.10 PTSD (POST-TRAUMATIC STRESS DISORDER): ICD-10-CM

## 2024-01-18 DIAGNOSIS — F33.1 MAJOR DEPRESSIVE DISORDER, RECURRENT EPISODE, MODERATE: Primary | ICD-10-CM

## 2024-02-21 DIAGNOSIS — F90.0 ADHD, PREDOMINANTLY INATTENTIVE TYPE: Chronic | ICD-10-CM

## 2024-02-21 RX ORDER — DEXTROAMPHETAMINE SACCHARATE, AMPHETAMINE ASPARTATE, DEXTROAMPHETAMINE SULFATE AND AMPHETAMINE SULFATE 2.5; 2.5; 2.5; 2.5 MG/1; MG/1; MG/1; MG/1
10 TABLET ORAL 2 TIMES DAILY
Qty: 60 TABLET | Refills: 0 | Status: CANCELLED | OUTPATIENT
Start: 2024-02-21 | End: 2025-02-20

## 2024-02-21 NOTE — TELEPHONE ENCOUNTER
Rx Refill Note  Requested Prescriptions     Pending Prescriptions Disp Refills    amphetamine-dextroamphetamine (Adderall) 10 MG tablet 60 tablet 0     Sig: Take 1 tablet by mouth 2 (Two) Times a Day.      Last office visit with prescribing clinician: 1/5/2024   Last telemedicine visit with prescribing clinician: Visit date not found   Next office visit with prescribing clinician: 5/14/2024   Office Visit with Rae Chatterjee MD (01/05/2024)   [facility] Abbreviated Urine Drug Screen - (01/05/2024)                     Would you like a call back once the refill request has been completed: [] Yes [] No    If the office needs to give you a call back, can they leave a voicemail: [] Yes [] No    Gracia Celeste MA  02/21/24, 16:05 EST

## 2024-02-22 RX ORDER — DEXTROAMPHETAMINE SACCHARATE, AMPHETAMINE ASPARTATE, DEXTROAMPHETAMINE SULFATE AND AMPHETAMINE SULFATE 2.5; 2.5; 2.5; 2.5 MG/1; MG/1; MG/1; MG/1
10 TABLET ORAL 2 TIMES DAILY
Qty: 60 TABLET | Refills: 0 | Status: SHIPPED | OUTPATIENT
Start: 2024-02-22 | End: 2025-02-21

## 2024-03-20 DIAGNOSIS — F90.0 ADHD, PREDOMINANTLY INATTENTIVE TYPE: Chronic | ICD-10-CM

## 2024-03-20 RX ORDER — DEXTROAMPHETAMINE SACCHARATE, AMPHETAMINE ASPARTATE, DEXTROAMPHETAMINE SULFATE AND AMPHETAMINE SULFATE 2.5; 2.5; 2.5; 2.5 MG/1; MG/1; MG/1; MG/1
10 TABLET ORAL 2 TIMES DAILY
Qty: 60 TABLET | Refills: 0 | Status: CANCELLED | OUTPATIENT
Start: 2024-03-20 | End: 2025-03-20

## 2024-03-21 DIAGNOSIS — F90.0 ADHD, PREDOMINANTLY INATTENTIVE TYPE: Chronic | ICD-10-CM

## 2024-03-22 RX ORDER — DEXTROAMPHETAMINE SACCHARATE, AMPHETAMINE ASPARTATE, DEXTROAMPHETAMINE SULFATE AND AMPHETAMINE SULFATE 2.5; 2.5; 2.5; 2.5 MG/1; MG/1; MG/1; MG/1
10 TABLET ORAL 2 TIMES DAILY
Qty: 60 TABLET | Refills: 0 | Status: SHIPPED | OUTPATIENT
Start: 2024-03-22 | End: 2025-03-22

## 2024-04-30 DIAGNOSIS — F90.0 ADHD, PREDOMINANTLY INATTENTIVE TYPE: Chronic | ICD-10-CM

## 2024-04-30 RX ORDER — DEXTROAMPHETAMINE SACCHARATE, AMPHETAMINE ASPARTATE, DEXTROAMPHETAMINE SULFATE AND AMPHETAMINE SULFATE 2.5; 2.5; 2.5; 2.5 MG/1; MG/1; MG/1; MG/1
10 TABLET ORAL 2 TIMES DAILY
Qty: 60 TABLET | Refills: 0 | Status: CANCELLED | OUTPATIENT
Start: 2024-04-30 | End: 2025-04-30

## 2024-05-01 DIAGNOSIS — F90.0 ADHD, PREDOMINANTLY INATTENTIVE TYPE: Chronic | ICD-10-CM

## 2024-05-03 RX ORDER — DEXTROAMPHETAMINE SACCHARATE, AMPHETAMINE ASPARTATE, DEXTROAMPHETAMINE SULFATE AND AMPHETAMINE SULFATE 2.5; 2.5; 2.5; 2.5 MG/1; MG/1; MG/1; MG/1
10 TABLET ORAL 2 TIMES DAILY
Qty: 60 TABLET | Refills: 0 | Status: SHIPPED | OUTPATIENT
Start: 2024-05-03 | End: 2025-05-03

## 2024-05-14 ENCOUNTER — OFFICE VISIT (OUTPATIENT)
Dept: PSYCHIATRY | Facility: CLINIC | Age: 29
End: 2024-05-14
Payer: COMMERCIAL

## 2024-05-14 DIAGNOSIS — F43.10 PTSD (POST-TRAUMATIC STRESS DISORDER): Chronic | ICD-10-CM

## 2024-05-14 DIAGNOSIS — F90.0 ADHD, PREDOMINANTLY INATTENTIVE TYPE: Chronic | ICD-10-CM

## 2024-05-14 DIAGNOSIS — F33.1 MAJOR DEPRESSIVE DISORDER, RECURRENT EPISODE, MODERATE: Primary | Chronic | ICD-10-CM
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
## 2024-05-23 DIAGNOSIS — F90.0 ADHD, PREDOMINANTLY INATTENTIVE TYPE: Chronic | ICD-10-CM

## 2024-05-23 RX ORDER — DEXTROAMPHETAMINE SACCHARATE, AMPHETAMINE ASPARTATE, DEXTROAMPHETAMINE SULFATE AND AMPHETAMINE SULFATE 2.5; 2.5; 2.5; 2.5 MG/1; MG/1; MG/1; MG/1
10 TABLET ORAL 2 TIMES DAILY
Qty: 60 TABLET | Refills: 0 | Status: CANCELLED | OUTPATIENT
Start: 2024-05-23 | End: 2025-05-23

## 2024-05-24 DIAGNOSIS — F90.0 ADHD, PREDOMINANTLY INATTENTIVE TYPE: Chronic | ICD-10-CM

## 2024-05-24 RX ORDER — DEXTROAMPHETAMINE SACCHARATE, AMPHETAMINE ASPARTATE, DEXTROAMPHETAMINE SULFATE AND AMPHETAMINE SULFATE 2.5; 2.5; 2.5; 2.5 MG/1; MG/1; MG/1; MG/1
10 TABLET ORAL 2 TIMES DAILY
Qty: 60 TABLET | Refills: 0 | OUTPATIENT
Start: 2024-05-24 | End: 2025-05-24

## 2024-05-24 NOTE — TELEPHONE ENCOUNTER
Rx Refill Note  Requested Prescriptions     Pending Prescriptions Disp Refills    amphetamine-dextroamphetamine (Adderall) 10 MG tablet 60 tablet 0     Sig: Take 1 tablet by mouth 2 (Two) Times a Day.      Last office visit with prescribing clinician: 5/14/2024   Last telemedicine visit with prescribing clinician: Visit date not found   Next office visit with prescribing clinician: 9/13/2024   Office Visit with Rae Chatterjee MD (05/14/2024)   [facility] Abbreviated Urine Drug Screen - (01/05/2024)                     Would you like a call back once the refill request has been completed: [] Yes [] No    If the office needs to give you a call back, can they leave a voicemail: [] Yes [] No    Gracia Celeste MA  05/24/24, 09:35 EDT

## 2024-06-27 DIAGNOSIS — F90.0 ADHD, PREDOMINANTLY INATTENTIVE TYPE: Chronic | ICD-10-CM

## 2024-06-27 RX ORDER — DEXTROAMPHETAMINE SACCHARATE, AMPHETAMINE ASPARTATE, DEXTROAMPHETAMINE SULFATE AND AMPHETAMINE SULFATE 2.5; 2.5; 2.5; 2.5 MG/1; MG/1; MG/1; MG/1
10 TABLET ORAL 2 TIMES DAILY
Qty: 60 TABLET | Refills: 0 | Status: SHIPPED | OUTPATIENT
Start: 2024-06-27 | End: 2025-06-27

## 2024-06-27 NOTE — TELEPHONE ENCOUNTER
Rx Refill Note  Requested Prescriptions     Pending Prescriptions Disp Refills    amphetamine-dextroamphetamine (Adderall) 10 MG tablet 60 tablet 0     Sig: Take 1 tablet by mouth 2 (Two) Times a Day.      Last office visit with prescribing clinician: 5/14/2024   Last telemedicine visit with prescribing clinician: Visit date not found   Next office visit with prescribing clinician: 9/13/2024   Office Visit with Rae Chatterjee MD (05/14/2024)   [facility] Abbreviated Urine Drug Screen - (01/05/2024)                     Would you like a call back once the refill request has been completed: [] Yes [] No    If the office needs to give you a call back, can they leave a voicemail: [] Yes [] No    Gracia Celeste MA  06/27/24, 15:01 EDT

## 2024-07-24 ENCOUNTER — TELEMEDICINE (OUTPATIENT)
Dept: FAMILY MEDICINE CLINIC | Facility: TELEHEALTH | Age: 29
End: 2024-07-24
Payer: COMMERCIAL

## 2024-07-24 DIAGNOSIS — J06.9 UPPER RESPIRATORY TRACT INFECTION, UNSPECIFIED TYPE: Primary | ICD-10-CM

## 2024-07-24 RX ORDER — PREDNISONE 10 MG/1
TABLET ORAL
Qty: 21 TABLET | Refills: 0 | Status: SHIPPED | OUTPATIENT
Start: 2024-07-24

## 2024-07-24 RX ORDER — BROMPHENIRAMINE MALEATE, PSEUDOEPHEDRINE HYDROCHLORIDE, AND DEXTROMETHORPHAN HYDROBROMIDE 2; 30; 10 MG/5ML; MG/5ML; MG/5ML
10 SYRUP ORAL 4 TIMES DAILY PRN
Qty: 200 ML | Refills: 0 | Status: SHIPPED | OUTPATIENT
Start: 2024-07-24

## 2024-07-24 RX ORDER — LOPERAMIDE HYDROCHLORIDE 2 MG/1
2 TABLET ORAL
COMMUNITY
Start: 2024-04-22

## 2024-07-24 RX ORDER — ALBUTEROL SULFATE 90 UG/1
2 AEROSOL, METERED RESPIRATORY (INHALATION) EVERY 4 HOURS PRN
Qty: 18 G | Refills: 0 | Status: SHIPPED | OUTPATIENT
Start: 2024-07-24

## 2024-07-24 NOTE — PROGRESS NOTES
You have chosen to receive care through a telehealth visit.  Do you consent to use a video/audio connection for your medical care today? Yes     CHIEF COMPLAINT  No chief complaint on file.        HPI  Anayeli Burdick is a 29 y.o. female  presents with complaint of 2 day history of loss of voice, dry cough, congestion, wheezing.  Denies fever, sore throat, ear pain. Has been taking mucinex with little relief    Review of Systems  See HPI    Past Medical History:   Diagnosis Date    ADHD 1995    Anxiety 2013    Asthma 2004    Back pain 2020    Back problem     Claustrophobia     Depression 2017    Headache 2013    Visual impairment 2003       Family History   Problem Relation Age of Onset    Arthritis Mother     Multiple sclerosis Mother     Thyroid disease Mother     Calcium disorder Mother     Cataracts Mother     Cancer Mother     Thyroid nodules Mother     Stroke Mother     Osteoporotic fracture Mother     Hypertension Father     Heart attack Father     Heart disease Father     Parkinsonism Father     Abnormal EKG Father     Allergies Father     Post-traumatic stress disorder Father     Stroke Father     Thyroid disease Brother     Calcium disorder Brother     Vision loss Brother     Kidney disease Maternal Grandmother     Kidney failure Maternal Grandmother     Cancer Maternal Grandfather     Throat cancer Maternal Grandfather     Stroke Paternal Grandmother     Heart disease Paternal Grandmother     Cancer Paternal Grandfather        Social History     Socioeconomic History    Marital status:     Number of children: 0   Tobacco Use    Smoking status: Never    Smokeless tobacco: Never    Tobacco comments:     Exposed to second hand smoke as a child   Vaping Use    Vaping status: Never Used   Substance and Sexual Activity    Alcohol use: Not Currently     Comment: only while on vacation    Drug use: Never    Sexual activity: Yes     Partners: Male     Birth control/protection: Implant     Comment:  Kasia       Anayeli Burdick  reports that she has never smoked. She has never used smokeless tobacco.              There were no vitals taken for this visit.    PHYSICAL EXAM  Physical Exam   Constitutional: She is oriented to person, place, and time. She appears well-developed and well-nourished. She does not have a sickly appearance. She does not appear ill.   Very hoarse voice   HENT:   Head: Normocephalic and atraumatic.   Pulmonary/Chest: Effort normal.  No respiratory distress.  Neurological: She is alert and oriented to person, place, and time.           Diagnoses and all orders for this visit:    1. Upper respiratory tract infection, unspecified type (Primary)  -     predniSONE (DELTASONE) 10 MG (21) dose pack; Use as directed on package  Dispense: 21 tablet; Refill: 0  -     brompheniramine-pseudoephedrine-DM 30-2-10 MG/5ML syrup; Take 10 mL by mouth 4 (Four) Times a Day As Needed for Congestion, Cough or Allergies.  Dispense: 200 mL; Refill: 0  -     albuterol sulfate  (90 Base) MCG/ACT inhaler; Inhale 2 puffs Every 4 (Four) Hours As Needed for Wheezing.  Dispense: 18 g; Refill: 0    --take medications as prescribed  --increase fluids, rest as needed, tylenol or ibuprofen for pain  --f/u in 5-7 days if no improvement        FOLLOW-UP  As discussed during visit with PCP/[facility] if no improvement or Urgent Care/Emergency Department if worsening of symptoms    Patient verbalizes understanding of medication dosage, comfort measures, instructions for treatment and follow-up.    KRISTOPHER Silver  07/24/2024  08:04 EDT    The use of a video visit has been reviewed with the patient and verbal informed consent has been obtained. Myself and Anayeli Burdick participated in this visit. The patient is located in [address] [place] [address] [place] [facility].    I am located in [place], [place]. Mychart and [employer] were utilized. I spent 8 minutes in the patient's chart for this visit.      Note  Disclaimer: At [facility], we believe that sharing information builds trust and better   relationships. You are receiving this note because you recently visited [facility]. It is possible you   will see health information before a provider has talked with you about it. This kind of information can   be easy to misunderstand. To help you fully understand what it means for your health, we urge you to   discuss this note with your provider.

## 2024-07-24 NOTE — PATIENT INSTRUCTIONS
Upper Respiratory Infection, Adult  An upper respiratory infection (URI) is a common viral infection of the nose, throat, and upper air passages that lead to the lungs. The most common type of URI is the common cold. URIs usually get better on their own, without medical treatment.  What are the causes?  A URI is caused by a virus. You may catch a virus by:  Breathing in droplets from an infected person's cough or sneeze.  Touching something that has been exposed to the virus (is contaminated) and then touching your mouth, nose, or eyes.  What increases the risk?  You are more likely to get a URI if:  You are very young or very old.  You have close contact with others, such as at work, school, or a health care facility.  You smoke.  You have long-term (chronic) heart or lung disease.  You have a weakened disease-fighting system (immune system).  You have nasal allergies or asthma.  You are experiencing a lot of stress.  You have poor nutrition.  What are the signs or symptoms?  A URI usually involves some of the following symptoms:  Runny or stuffy (congested) nose.  Cough.  Sneezing.  Sore throat.  Headache.  Fatigue.  Fever.  Loss of appetite.  Pain in your forehead, behind your eyes, and over your cheekbones (sinus pain).  Muscle aches.  Redness or irritation of the eyes.  Pressure in the ears or face.  How is this diagnosed?  This condition may be diagnosed based on your medical history and symptoms, and a physical exam. Your health care provider may use a swab to take a mucus sample from your nose (nasal swab). This sample can be tested to determine what virus is causing the illness.  How is this treated?  URIs usually get better on their own within 7-10 days. Medicines cannot cure URIs, but your health care provider may recommend certain medicines to help relieve symptoms, such as:  Over-the-counter cold medicines.  Cough suppressants. Coughing is a type of defense against infection that helps to clear the  respiratory system, so take these medicines only as recommended by your health care provider.  Fever-reducing medicines.  Follow these instructions at home:  Activity  Rest as needed.  If you have a fever, stay home from work or school until your fever is gone or until your health care provider says your URI cannot spread to other people (is no longer contagious). Your health care provider may have you wear a face mask to prevent your infection from spreading.  Relieving symptoms  Gargle with a mixture of salt and water 3-4 times a day or as needed. To make salt water, completely dissolve ½-1 tsp (3-6 g) of salt in 1 cup (237 mL) of warm water.  Use a cool-mist humidifier to add moisture to the air. This can help you breathe more easily.  Eating and drinking    Drink enough fluid to keep your urine pale yellow.  Eat soups and other clear broths.  General instructions    Take over-the-counter and prescription medicines only as told by your health care provider. These include cold medicines, fever reducers, and cough suppressants.  Do not use any products that contain nicotine or tobacco. These products include cigarettes, chewing tobacco, and vaping devices, such as e-cigarettes. If you need help quitting, ask your health care provider.  Stay away from secondhand smoke.  Stay up to date on all immunizations, including the yearly (annual) flu vaccine.  Keep all follow-up visits. This is important.  How to prevent the spread of infection to others  URIs can be contagious. To prevent the infection from spreading:  Wash your hands with soap and water for at least 20 seconds. If soap and water are not available, use hand .  Avoid touching your mouth, face, eyes, or nose.  Cough or sneeze into a tissue or your sleeve or elbow instead of into your hand or into the air.    Contact a health care provider if:  You are getting worse instead of better.  You have a fever or chills.  Your mucus is brown or red.  You have  yellow or brown discharge coming from your nose.  You have pain in your face, especially when you bend forward.  You have swollen neck glands.  You have pain while swallowing.  You have white areas in the back of your throat.  Get help right away if:  You have shortness of breath that gets worse.  You have severe or persistent:  Headache.  Ear pain.  Sinus pain.  Chest pain.  You have chronic lung disease along with any of the following:  Making high-pitched whistling sounds when you breathe, most often when you breathe out (wheezing).  Prolonged cough (more than 14 days).  Coughing up blood.  A change in your usual mucus.  You have a stiff neck.  You have changes in your:  Vision.  Hearing.  Thinking.  Mood.  These symptoms may be an emergency. Get help right away. Call 911.  Do not wait to see if the symptoms will go away.  Do not drive yourself to the hospital.  Summary  An upper respiratory infection (URI) is a common infection of the nose, throat, and upper air passages that lead to the lungs.  A URI is caused by a virus.  URIs usually get better on their own within 7-10 days.  Medicines cannot cure URIs, but your health care provider may recommend certain medicines to help relieve symptoms.  This information is not intended to replace advice given to you by your health care provider. Make sure you discuss any questions you have with your health care provider.  Document Revised: 07/20/2022 Document Reviewed: 07/20/2022  [employer] Patient Education © 2024 [employer]

## 2024-07-30 ENCOUNTER — OFFICE VISIT (OUTPATIENT)
Dept: BARIATRICS/WEIGHT MGMT | Facility: CLINIC | Age: 29
End: 2024-07-30
Payer: COMMERCIAL

## 2024-07-30 VITALS
WEIGHT: 182 LBS | BODY MASS INDEX: 31.07 KG/M2 | SYSTOLIC BLOOD PRESSURE: 131 MMHG | HEART RATE: 92 BPM | DIASTOLIC BLOOD PRESSURE: 88 MMHG | HEIGHT: 64 IN | TEMPERATURE: 98.1 F

## 2024-07-30 DIAGNOSIS — Z71.3 DIETARY COUNSELING: ICD-10-CM

## 2024-07-30 DIAGNOSIS — E66.9 OBESITY, CLASS I, BMI 30-34.9: Primary | ICD-10-CM

## 2024-07-31 PROBLEM — E66.811 OBESITY, CLASS I, BMI 30-34.9: Status: ACTIVE | Noted: 2022-03-21

## 2024-07-31 NOTE — PROGRESS NOTES
[facility]  [address]  [facility] [id number] [phone]  [address]  [facility] [id number] [phone]  Dept: [phone]  7/31/2024      Anayeli Burdick.  [id number]  [id number]  1995  female      Chief Complaint   Patient presents with    Follow-up     Fup wegovy        Post-Op Bariatric Medicine:   Anayeli Burdick presents today for follow up today for provider supervised medical weight loss.    HPI:   Today's weight is 82.6 kg (182 lb) pounds, today's BMI is Body mass index is 31.22 kg/m²., she has a  loss of 48 pounds since the last visit.     Anayeli Burdick denies nausea, vomiting, reflux, dysphagia and reports tolerating diet.  Had gotten down to 150#.  Was taking Wegovy once a month which worked well for her until recently.  States plans are for total hysterectomy due to fibroid and abnormal uterine bleeding.  Has been placed on hrt and otc supplements as well.  She has been on and off steroids recently as well.  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  She works nights and works 17 days with 1 day off, 13 days with 1 day off.  She is trying to work extra so she can cover her expenses when she is out after surgery in September.  She has been tracking her intake on MFP and gets 980-1000 calories, 50-60 grams of protein.  Hasn't been drinking as much water as she was previously but is working on it.     Diet and Exercise: Diet history reviewed and discussed with the patient. Weight loss/gains to date discussed with the patient. The patient states they are eating 50-60 grams of protein per day. She reports eating 2-3 meals per day, a typical portion size of 1 cup, eating 0-1 snacks per day, drinking 4 or more 8-oz. glasses of water per day, no carbonated beverage consumption and exercising regularly.     Review of Systems   Constitutional:  Positive for activity change,  appetite change and fatigue.   Respiratory:  Negative for shortness of breath.    Cardiovascular:  Negative for chest pain.   Genitourinary:  Positive for menstrual problem.   All other systems reviewed and are negative.      Patient Active Problem List   Diagnosis    ADHD, predominantly inattentive type    Asthma    Major depressive disorder, recurrent episode, moderate    PTSD (post-traumatic stress disorder)    Chronic back pain    Dietary counseling    Obesity, Class I, BMI 30-34.9    Pain in thoracic spine    Neck pain    Allergic reaction       Past Medical History:   Diagnosis Date    ADHD 1995    Anxiety 2013    Asthma 2004    Back pain 2020    Back problem     Claustrophobia     Depression 2017    Headache 2013    Visual impairment 2003       The following portions of the patient's history were reviewed and updated as appropriate: allergies, current medications, past medical history, past surgical history, and problem list.    Vitals:    07/30/24 1337   BP: 131/88   Pulse: 92   Temp: 98.1 °F (36.7 °C)       Physical Exam  Vitals reviewed.   Constitutional:       General: She is not in acute distress.     Appearance: Normal appearance. She is morbidly obese and obese.   HENT:      Head: Normocephalic and atraumatic.      Mouth/Throat:      Mouth: Mucous membranes are moist.      Pharynx: Oropharynx is clear.   Eyes:      General: No scleral icterus.     Extraocular Movements: Extraocular movements intact.      Conjunctiva/sclera: Conjunctivae normal.      Pupils: Pupils are equal, round, and reactive to light.   Cardiovascular:      Rate and Rhythm: Normal rate and regular rhythm.   Pulmonary:      Effort: Pulmonary effort is normal. No respiratory distress.   Abdominal:      General: Bowel sounds are normal.      Palpations: Abdomen is soft.   Musculoskeletal:         General: Normal range of motion.      Cervical back: Normal range of motion and neck supple.   Skin:     General: Skin is warm and dry.    Neurological:      General: No focal deficit present.      Mental Status: She is alert and oriented to person, place, and time.   Psychiatric:         Mood and Affect: Mood normal.         Behavior: Behavior normal.         Thought Content: Thought content normal.         Judgment: Judgment normal.         Assessment:   The patient has experienced a small amount of weight gain.  She was taking Wegovy only once a month and maintaining for a long period of time until she started hrt and otc supplements for irregular periods.  Unfortunately, her insurance has now dropped coverage for [facility] medication although she was very successful.     Plan:   Discussion going over options.  She wishes to pursue compounded semaglutide.   We did discuss GLP-1 therapy, specifically semaglutide. We discussed dosing, administration including injection site preparation with alcohol, titration, common side effects including nausea, vomiting, constipation, diarrhea, hypoglycemia, injection site reaction, headache, and abdominal pain. We discussed contraindications including pregnancy. Patient denies history or family history of MEN2 or thyroid cancer, or history of pancreatitis. her does not take insulin or a sulfonylurea. We did discuss remote chance of renal impairment as it was associated with GI symptoms in trials.     Patient will start weekly Wegovy at the 0.25mg dose subcutaneously. Anayelielier Deane Gennaro verbalized understanding that her will take one dose weekly and will repeat this dosing for 1 month. We discussed storing future doses in the refrigerator and her was advised to take a dose if missed ASAP if the next scheduled dose is greater than 48 hours away.     Patient both watched, and was able to repeat demonstration of administration using medication including site preparation, administration, and disposal. her will call and report any adverse effects to our group in the instance that any occur and we will advise at that time.        Encouraged patient to be sure to get plenty of lean protein per day through small frequent meals all with a protein source.   Activity restrictions: none.   Recommended patient be sure to get at least 70 grams of protein per day by eating small, frequent meals all with high lean protein choices. Be sure to limit/cut back on daily carbohydrate intake. Discussed with the patient the recommended amount of water per day to intake- half of body weight in ounces. Reviewed vitamin requirements. Be sure to do routine exercise, 150 minutes per week minimum, including both cardio and strength training.     Instructions / Recommendations: dietary counseling recommended, recommended a daily protein intake of  grams, vitamin supplement(s) recommended, recommended exercising at least 150 minutes per week, behavior modifications recommended and instructed to call the office for concerns, questions, or problems.     The patient was instructed to follow up in 6 months .     The patient was counseled regarding. Total time spent during this encounter today was 25 minutes

## 2024-08-16 DIAGNOSIS — F90.0 ADHD, PREDOMINANTLY INATTENTIVE TYPE: Chronic | ICD-10-CM

## 2024-08-16 RX ORDER — DEXTROAMPHETAMINE SACCHARATE, AMPHETAMINE ASPARTATE, DEXTROAMPHETAMINE SULFATE AND AMPHETAMINE SULFATE 2.5; 2.5; 2.5; 2.5 MG/1; MG/1; MG/1; MG/1
10 TABLET ORAL 2 TIMES DAILY
Qty: 60 TABLET | Refills: 0 | Status: SHIPPED | OUTPATIENT
Start: 2024-08-16 | End: 2025-08-16

## 2024-08-16 NOTE — TELEPHONE ENCOUNTER
Rx Refill Note  Requested Prescriptions     Pending Prescriptions Disp Refills    amphetamine-dextroamphetamine (Adderall) 10 MG tablet 60 tablet 0     Sig: Take 1 tablet by mouth 2 (Two) Times a Day.      Last office visit with prescribing clinician: 5/14/2024   Last telemedicine visit with prescribing clinician: Visit date not found   Next office visit with prescribing clinician: 9/13/2024   Office Visit with Rae Chatterjee MD (05/14/2024)   [facility] Abbreviated Urine Drug Screen - (01/05/2024)                     Would you like a call back once the refill request has been completed: [] Yes [] No    If the office needs to give you a call back, can they leave a voicemail: [] Yes [] No    Gracia Celeste MA  08/16/24, 08:37 EDT    UPLOADED

## 2024-08-19 ENCOUNTER — PRIOR AUTHORIZATION (OUTPATIENT)
Dept: PSYCHIATRY | Facility: CLINIC | Age: 29
End: 2024-08-19
Payer: COMMERCIAL

## 2024-09-13 ENCOUNTER — LAB (OUTPATIENT)
Dept: LAB | Facility: HOSPITAL | Age: 29
End: 2024-09-13
Payer: COMMERCIAL

## 2024-09-13 ENCOUNTER — OFFICE VISIT (OUTPATIENT)
Dept: PSYCHIATRY | Facility: CLINIC | Age: 29
End: 2024-09-13
Payer: COMMERCIAL

## 2024-09-13 ENCOUNTER — HOSPITAL ENCOUNTER (OUTPATIENT)
Dept: CARDIOLOGY | Facility: HOSPITAL | Age: 29
Discharge: HOME | End: 2024-09-13
Payer: COMMERCIAL

## 2024-09-13 DIAGNOSIS — F33.1 MAJOR DEPRESSIVE DISORDER, RECURRENT EPISODE, MODERATE: Primary | Chronic | ICD-10-CM

## 2024-09-13 DIAGNOSIS — F43.10 PTSD (POST-TRAUMATIC STRESS DISORDER): ICD-10-CM

## 2024-09-13 DIAGNOSIS — F90.0 ADHD, PREDOMINANTLY INATTENTIVE TYPE: Chronic | ICD-10-CM

## 2024-09-13 LAB
ABO GROUP BLD: NORMAL
ALBUMIN SERPL-MCNC: 4.3 G/DL (ref 3.5–5.2)
ALBUMIN/GLOB SERPL: 1.6 G/DL
ALP SERPL-CCNC: 34 U/L (ref 39–117)
ALT SERPL W P-5'-P-CCNC: 12 U/L (ref 1–33)
ANION GAP SERPL CALCULATED.3IONS-SCNC: 10 MMOL/L (ref 5–15)
AST SERPL-CCNC: 14 U/L (ref 1–32)
BASOPHILS # BLD AUTO: 0.03 10*3/MM3 (ref 0–0.2)
BASOPHILS NFR BLD AUTO: 0.5 % (ref 0–1.5)
BILIRUB SERPL-MCNC: 0.2 MG/DL (ref 0–1.2)
BLD GP AB SCN SERPL QL: NEGATIVE
BUN SERPL-MCNC: 12 MG/DL (ref 6–20)
BUN/CREAT SERPL: 15.6 (ref 7–25)
CALCIUM SPEC-SCNC: 8.9 MG/DL (ref 8.6–10.5)
CHLORIDE SERPL-SCNC: 103 MMOL/L (ref 98–107)
CO2 SERPL-SCNC: 24 MMOL/L (ref 22–29)
CREAT SERPL-MCNC: 0.77 MG/DL (ref 0.57–1)
DEPRECATED RDW RBC AUTO: 36.8 FL (ref 37–54)
EGFRCR SERPLBLD CKD-EPI 2021: 107.2 ML/MIN/1.73
EOSINOPHIL # BLD AUTO: 0.16 10*3/MM3 (ref 0–0.4)
EOSINOPHIL NFR BLD AUTO: 2.8 % (ref 0.3–6.2)
ERYTHROCYTE [DISTWIDTH] IN BLOOD BY AUTOMATED COUNT: 11.6 % (ref 12.3–15.4)
GLOBULIN UR ELPH-MCNC: 2.7 GM/DL
GLUCOSE SERPL-MCNC: 93 MG/DL (ref 65–99)
HCT VFR BLD AUTO: 45.8 % (ref 34–46.6)
HGB BLD-MCNC: 15 G/DL (ref 12–15.9)
IMM GRANULOCYTES # BLD AUTO: 0.01 10*3/MM3 (ref 0–0.05)
IMM GRANULOCYTES NFR BLD AUTO: 0.2 % (ref 0–0.5)
LYMPHOCYTES # BLD AUTO: 2.21 10*3/MM3 (ref 0.7–3.1)
LYMPHOCYTES NFR BLD AUTO: 38 % (ref 19.6–45.3)
MCH RBC QN AUTO: 29 PG (ref 26.6–33)
MCHC RBC AUTO-ENTMCNC: 32.8 G/DL (ref 31.5–35.7)
MCV RBC AUTO: 88.4 FL (ref 79–97)
MONOCYTES # BLD AUTO: 0.54 10*3/MM3 (ref 0.1–0.9)
MONOCYTES NFR BLD AUTO: 9.3 % (ref 5–12)
NEUTROPHILS NFR BLD AUTO: 2.86 10*3/MM3 (ref 1.7–7)
NEUTROPHILS NFR BLD AUTO: 49.2 % (ref 42.7–76)
NRBC BLD AUTO-RTO: 0 /100 WBC (ref 0–0.2)
PLATELET # BLD AUTO: 268 10*3/MM3 (ref 140–450)
PMV BLD AUTO: 10.3 FL (ref 6–12)
POTASSIUM SERPL-SCNC: 3.7 MMOL/L (ref 3.5–5.2)
PROT SERPL-MCNC: 7 G/DL (ref 6–8.5)
QT INTERVAL: 357 MS
QTC INTERVAL: 421 MS
RBC # BLD AUTO: 5.18 10*6/MM3 (ref 3.77–5.28)
RH BLD: POSITIVE
SODIUM SERPL-SCNC: 137 MMOL/L (ref 136–145)
T&S EXPIRATION DATE: NORMAL
WBC NRBC COR # BLD AUTO: 5.81 10*3/MM3 (ref 3.4–10.8)

## 2024-09-13 PROCEDURE — 80053 COMPREHEN METABOLIC PANEL: CPT

## 2024-09-13 PROCEDURE — 86901 BLOOD TYPING SEROLOGIC RH(D): CPT

## 2024-09-13 PROCEDURE — 86850 RBC ANTIBODY SCREEN: CPT

## 2024-09-13 PROCEDURE — 36415 COLL VENOUS BLD VENIPUNCTURE: CPT | Performed by: STUDENT IN AN ORGANIZED HEALTH CARE EDUCATION/TRAINING PROGRAM

## 2024-09-13 PROCEDURE — 85025 COMPLETE CBC W/AUTO DIFF WBC: CPT | Performed by: STUDENT IN AN ORGANIZED HEALTH CARE EDUCATION/TRAINING PROGRAM

## 2024-09-13 PROCEDURE — 93005 ELECTROCARDIOGRAM TRACING: CPT | Performed by: STUDENT IN AN ORGANIZED HEALTH CARE EDUCATION/TRAINING PROGRAM

## 2024-09-13 PROCEDURE — 86900 BLOOD TYPING SEROLOGIC ABO: CPT

## 2024-09-13 RX ORDER — DEXTROAMPHETAMINE SACCHARATE, AMPHETAMINE ASPARTATE, DEXTROAMPHETAMINE SULFATE AND AMPHETAMINE SULFATE 2.5; 2.5; 2.5; 2.5 MG/1; MG/1; MG/1; MG/1
10 TABLET ORAL 2 TIMES DAILY
Qty: 60 TABLET | Refills: 0 | Status: SHIPPED | OUTPATIENT
Start: 2024-09-13 | End: 2025-09-13

## 2024-09-13 RX ORDER — VORTIOXETINE 20 MG/1
20 TABLET, FILM COATED ORAL
Qty: 90 TABLET | Refills: 1 | Status: SHIPPED | OUTPATIENT
Start: 2024-09-13
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
## 2024-09-24 PROBLEM — N93.9 ABNORMAL UTERINE BLEEDING: Status: ACTIVE | Noted: 2024-09-24

## 2024-09-25 ENCOUNTER — ANESTHESIA (OUTPATIENT)
Dept: PERIOP | Facility: HOSPITAL | Age: 29
End: 2024-09-25
Payer: COMMERCIAL

## 2024-09-25 ENCOUNTER — HOSPITAL ENCOUNTER (OUTPATIENT)
Facility: HOSPITAL | Age: 29
Discharge: HOME | End: 2024-09-25
Attending: STUDENT IN AN ORGANIZED HEALTH CARE EDUCATION/TRAINING PROGRAM | Admitting: STUDENT IN AN ORGANIZED HEALTH CARE EDUCATION/TRAINING PROGRAM
Payer: COMMERCIAL

## 2024-09-25 ENCOUNTER — ANESTHESIA EVENT (OUTPATIENT)
Dept: PERIOP | Facility: HOSPITAL | Age: 29
End: 2024-09-25
Payer: COMMERCIAL

## 2024-09-25 VITALS
TEMPERATURE: 97.6 F | BODY MASS INDEX: 32.27 KG/M2 | SYSTOLIC BLOOD PRESSURE: 107 MMHG | OXYGEN SATURATION: 100 % | DIASTOLIC BLOOD PRESSURE: 72 MMHG | HEIGHT: 64 IN | HEART RATE: 75 BPM | RESPIRATION RATE: 19 BRPM | WEIGHT: 189 LBS

## 2024-09-25 DIAGNOSIS — Z01.818 PRE-OPERATIVE EXAM: Primary | ICD-10-CM

## 2024-09-25 DIAGNOSIS — N93.9 ABNORMAL UTERINE BLEEDING: ICD-10-CM

## 2024-09-25 DIAGNOSIS — Z90.710 S/P TOTAL HYSTERECTOMY: ICD-10-CM

## 2024-09-25 DIAGNOSIS — D25.9 FIBROID, UTERINE: ICD-10-CM

## 2024-09-25 PROBLEM — R10.2 CHRONIC PELVIC PAIN IN FEMALE: Status: ACTIVE | Noted: 2024-09-25

## 2024-09-25 PROBLEM — N94.6 DYSMENORRHEA: Status: ACTIVE | Noted: 2024-09-25

## 2024-09-25 PROBLEM — G89.29 CHRONIC PELVIC PAIN IN FEMALE: Status: ACTIVE | Noted: 2024-09-25

## 2024-09-25 LAB
APTT PPP: 29.9 SECONDS (ref 24–31)
B-HCG UR QL: NEGATIVE
INR PPP: 1.01 (ref 0.93–1.1)
PROTHROMBIN TIME: 11 SECONDS (ref 9.6–11.7)

## 2024-09-25 PROCEDURE — 25010000002 HYDROMORPHONE 1 MG/ML SOLUTION: Performed by: NURSE ANESTHETIST, CERTIFIED REGISTERED

## 2024-09-25 PROCEDURE — 25010000002 ONDANSETRON PER 1 MG: Performed by: NURSE ANESTHETIST, CERTIFIED REGISTERED

## 2024-09-25 PROCEDURE — 25010000002 GENTAMICIN PER 80 MG: Performed by: STUDENT IN AN ORGANIZED HEALTH CARE EDUCATION/TRAINING PROGRAM

## 2024-09-25 PROCEDURE — 25010000002 MIDAZOLAM PER 1 MG: Performed by: NURSE ANESTHETIST, CERTIFIED REGISTERED

## 2024-09-25 PROCEDURE — 25810000003 LACTATED RINGERS PER 1000 ML: Performed by: STUDENT IN AN ORGANIZED HEALTH CARE EDUCATION/TRAINING PROGRAM

## 2024-09-25 PROCEDURE — 25010000002 CLINDAMYCIN PER 300 MG: Performed by: STUDENT IN AN ORGANIZED HEALTH CARE EDUCATION/TRAINING PROGRAM

## 2024-09-25 PROCEDURE — 25010000002 DEXAMETHASONE PER 1 MG: Performed by: NURSE ANESTHETIST, CERTIFIED REGISTERED

## 2024-09-25 PROCEDURE — 25010000002 FENTANYL CITRATE (PF) 100 MCG/2ML SOLUTION: Performed by: NURSE ANESTHETIST, CERTIFIED REGISTERED

## 2024-09-25 PROCEDURE — 85610 PROTHROMBIN TIME: CPT | Performed by: STUDENT IN AN ORGANIZED HEALTH CARE EDUCATION/TRAINING PROGRAM

## 2024-09-25 PROCEDURE — 25010000002 PROPOFOL 200 MG/20ML EMULSION: Performed by: NURSE ANESTHETIST, CERTIFIED REGISTERED

## 2024-09-25 PROCEDURE — 85730 THROMBOPLASTIN TIME PARTIAL: CPT | Performed by: STUDENT IN AN ORGANIZED HEALTH CARE EDUCATION/TRAINING PROGRAM

## 2024-09-25 PROCEDURE — 25010000002 SUGAMMADEX 200 MG/2ML SOLUTION: Performed by: NURSE ANESTHETIST, CERTIFIED REGISTERED

## 2024-09-25 PROCEDURE — 88307 TISSUE EXAM BY PATHOLOGIST: CPT | Performed by: STUDENT IN AN ORGANIZED HEALTH CARE EDUCATION/TRAINING PROGRAM

## 2024-09-25 PROCEDURE — 81025 URINE PREGNANCY TEST: CPT | Performed by: ANESTHESIOLOGY

## 2024-09-25 PROCEDURE — 25010000002 FENTANYL CITRATE (PF) 50 MCG/ML SOLUTION: Performed by: NURSE ANESTHETIST, CERTIFIED REGISTERED

## 2024-09-25 RX ORDER — SODIUM CHLORIDE 0.9 % (FLUSH) 0.9 %
3-10 SYRINGE (ML) INJECTION AS NEEDED
Status: DISCONTINUED | OUTPATIENT
Start: 2024-09-25 | End: 2024-09-25 | Stop reason: HOSPADM

## 2024-09-25 RX ORDER — CLINDAMYCIN PHOSPHATE 900 MG/50ML
900 INJECTION, SOLUTION INTRAVENOUS ONCE
Status: COMPLETED | OUTPATIENT
Start: 2024-09-25 | End: 2024-09-25

## 2024-09-25 RX ORDER — SODIUM CHLORIDE 9 MG/ML
40 INJECTION, SOLUTION INTRAVENOUS AS NEEDED
Status: DISCONTINUED | OUTPATIENT
Start: 2024-09-25 | End: 2024-09-25 | Stop reason: HOSPADM

## 2024-09-25 RX ORDER — AMOXICILLIN 250 MG
1 CAPSULE ORAL EVERY 12 HOURS PRN
Qty: 60 TABLET | Refills: 0 | Status: SHIPPED | OUTPATIENT
Start: 2024-09-25

## 2024-09-25 RX ORDER — DEXAMETHASONE SODIUM PHOSPHATE 4 MG/ML
INJECTION, SOLUTION INTRA-ARTICULAR; INTRALESIONAL; INTRAMUSCULAR; INTRAVENOUS; SOFT TISSUE AS NEEDED
Status: DISCONTINUED | OUTPATIENT
Start: 2024-09-25 | End: 2024-09-25 | Stop reason: SURG

## 2024-09-25 RX ORDER — LIDOCAINE HYDROCHLORIDE 20 MG/ML
INJECTION, SOLUTION EPIDURAL; INFILTRATION; INTRACAUDAL; PERINEURAL AS NEEDED
Status: DISCONTINUED | OUTPATIENT
Start: 2024-09-25 | End: 2024-09-25 | Stop reason: SURG

## 2024-09-25 RX ORDER — ENOXAPARIN SODIUM 100 MG/ML
40 INJECTION SUBCUTANEOUS
Qty: 12 ML | Refills: 0 | Status: SHIPPED | OUTPATIENT
Start: 2024-09-25

## 2024-09-25 RX ORDER — SCOLOPAMINE TRANSDERMAL SYSTEM 1 MG/1
1 PATCH, EXTENDED RELEASE TRANSDERMAL
Status: DISCONTINUED | OUTPATIENT
Start: 2024-09-25 | End: 2024-09-25 | Stop reason: HOSPADM

## 2024-09-25 RX ORDER — FENTANYL CITRATE 50 UG/ML
INJECTION, SOLUTION INTRAMUSCULAR; INTRAVENOUS AS NEEDED
Status: DISCONTINUED | OUTPATIENT
Start: 2024-09-25 | End: 2024-09-25 | Stop reason: SURG

## 2024-09-25 RX ORDER — DROPERIDOL 2.5 MG/ML
0.62 INJECTION, SOLUTION INTRAMUSCULAR; INTRAVENOUS ONCE AS NEEDED
Status: DISCONTINUED | OUTPATIENT
Start: 2024-09-25 | End: 2024-09-25 | Stop reason: HOSPADM

## 2024-09-25 RX ORDER — ACETAMINOPHEN 325 MG/1
650 TABLET ORAL ONCE AS NEEDED
Status: DISCONTINUED | OUTPATIENT
Start: 2024-09-25 | End: 2024-09-25 | Stop reason: HOSPADM

## 2024-09-25 RX ORDER — SODIUM CHLORIDE 0.9 % (FLUSH) 0.9 %
3 SYRINGE (ML) INJECTION EVERY 12 HOURS SCHEDULED
Status: DISCONTINUED | OUTPATIENT
Start: 2024-09-25 | End: 2024-09-25 | Stop reason: HOSPADM

## 2024-09-25 RX ORDER — LORAZEPAM 2 MG/ML
1 INJECTION INTRAMUSCULAR
Status: DISCONTINUED | OUTPATIENT
Start: 2024-09-25 | End: 2024-09-25 | Stop reason: HOSPADM

## 2024-09-25 RX ORDER — DEXMEDETOMIDINE HYDROCHLORIDE 100 UG/ML
INJECTION, SOLUTION INTRAVENOUS AS NEEDED
Status: DISCONTINUED | OUTPATIENT
Start: 2024-09-25 | End: 2024-09-25 | Stop reason: SURG

## 2024-09-25 RX ORDER — ONDANSETRON 2 MG/ML
4 INJECTION INTRAMUSCULAR; INTRAVENOUS ONCE AS NEEDED
Status: DISCONTINUED | OUTPATIENT
Start: 2024-09-25 | End: 2024-09-25 | Stop reason: HOSPADM

## 2024-09-25 RX ORDER — ONDANSETRON 2 MG/ML
INJECTION INTRAMUSCULAR; INTRAVENOUS AS NEEDED
Status: DISCONTINUED | OUTPATIENT
Start: 2024-09-25 | End: 2024-09-25 | Stop reason: SURG

## 2024-09-25 RX ORDER — ACETAMINOPHEN 325 MG/1
650 TABLET ORAL ONCE
Status: DISCONTINUED | OUTPATIENT
Start: 2024-09-25 | End: 2024-09-25 | Stop reason: HOSPADM

## 2024-09-25 RX ORDER — PROMETHAZINE HYDROCHLORIDE 25 MG/1
25 SUPPOSITORY RECTAL ONCE AS NEEDED
Status: DISCONTINUED | OUTPATIENT
Start: 2024-09-25 | End: 2024-09-25

## 2024-09-25 RX ORDER — FENTANYL CITRATE 50 UG/ML
50 INJECTION, SOLUTION INTRAMUSCULAR; INTRAVENOUS
Status: DISCONTINUED | OUTPATIENT
Start: 2024-09-25 | End: 2024-09-25 | Stop reason: HOSPADM

## 2024-09-25 RX ORDER — PHENYLEPHRINE HCL IN 0.9% NACL 1 MG/10 ML
SYRINGE (ML) INTRAVENOUS AS NEEDED
Status: DISCONTINUED | OUTPATIENT
Start: 2024-09-25 | End: 2024-09-25 | Stop reason: SURG

## 2024-09-25 RX ORDER — SENNOSIDES 8.6 MG
650 CAPSULE ORAL EVERY 8 HOURS PRN
Qty: 30 TABLET | Refills: 1 | Status: SHIPPED | OUTPATIENT
Start: 2024-09-25

## 2024-09-25 RX ORDER — MIDAZOLAM HYDROCHLORIDE 1 MG/ML
INJECTION INTRAMUSCULAR; INTRAVENOUS AS NEEDED
Status: DISCONTINUED | OUTPATIENT
Start: 2024-09-25 | End: 2024-09-25 | Stop reason: SURG

## 2024-09-25 RX ORDER — PROMETHAZINE HYDROCHLORIDE 25 MG/1
25 TABLET ORAL ONCE AS NEEDED
Status: DISCONTINUED | OUTPATIENT
Start: 2024-09-25 | End: 2024-09-25

## 2024-09-25 RX ORDER — ONDANSETRON 4 MG/1
4 TABLET, ORALLY DISINTEGRATING ORAL EVERY 8 HOURS PRN
Qty: 30 TABLET | Refills: 0 | Status: SHIPPED | OUTPATIENT
Start: 2024-09-25

## 2024-09-25 RX ORDER — PROPOFOL 10 MG/ML
INJECTION, EMULSION INTRAVENOUS AS NEEDED
Status: DISCONTINUED | OUTPATIENT
Start: 2024-09-25 | End: 2024-09-25 | Stop reason: SURG

## 2024-09-25 RX ORDER — HYDROCODONE BITARTRATE AND ACETAMINOPHEN 5; 325 MG/1; MG/1
1 TABLET ORAL ONCE AS NEEDED
Status: COMPLETED | OUTPATIENT
Start: 2024-09-25 | End: 2024-09-25

## 2024-09-25 RX ORDER — SODIUM CHLORIDE, SODIUM LACTATE, POTASSIUM CHLORIDE, CALCIUM CHLORIDE 600; 310; 30; 20 MG/100ML; MG/100ML; MG/100ML; MG/100ML
125 INJECTION, SOLUTION INTRAVENOUS CONTINUOUS
Status: DISCONTINUED | OUTPATIENT
Start: 2024-09-25 | End: 2024-09-25 | Stop reason: HOSPADM

## 2024-09-25 RX ORDER — HYDROCODONE BITARTRATE AND ACETAMINOPHEN 5; 325 MG/1; MG/1
1 TABLET ORAL EVERY 6 HOURS PRN
Qty: 30 TABLET | Refills: 0 | Status: SHIPPED | OUTPATIENT
Start: 2024-09-25

## 2024-09-25 RX ORDER — ROCURONIUM BROMIDE 10 MG/ML
INJECTION, SOLUTION INTRAVENOUS AS NEEDED
Status: DISCONTINUED | OUTPATIENT
Start: 2024-09-25 | End: 2024-09-25 | Stop reason: SURG

## 2024-09-25 RX ADMIN — CLINDAMYCIN PHOSPHATE 900 MG: 900 INJECTION, SOLUTION INTRAVENOUS at 11:32

## 2024-09-25 RX ADMIN — SUGAMMADEX 200 MG: 100 INJECTION, SOLUTION INTRAVENOUS at 13:47

## 2024-09-25 RX ADMIN — DEXMEDETOMIDINE HYDROCHLORIDE 10 MCG: 100 INJECTION, SOLUTION INTRAVENOUS at 13:17

## 2024-09-25 RX ADMIN — DEXMEDETOMIDINE HYDROCHLORIDE 10 MCG: 100 INJECTION, SOLUTION INTRAVENOUS at 11:47

## 2024-09-25 RX ADMIN — PROPOFOL 200 MG: 10 INJECTION, EMULSION INTRAVENOUS at 11:43

## 2024-09-25 RX ADMIN — FENTANYL CITRATE 100 MCG: 50 INJECTION, SOLUTION INTRAMUSCULAR; INTRAVENOUS at 11:43

## 2024-09-25 RX ADMIN — DEXMEDETOMIDINE HYDROCHLORIDE 10 MCG: 100 INJECTION, SOLUTION INTRAVENOUS at 13:31

## 2024-09-25 RX ADMIN — FENTANYL CITRATE 50 MCG: 50 INJECTION, SOLUTION INTRAMUSCULAR; INTRAVENOUS at 14:38

## 2024-09-25 RX ADMIN — CLINDAMYCIN PHOSPHATE 900 MG: 900 INJECTION, SOLUTION INTRAVENOUS at 11:48

## 2024-09-25 RX ADMIN — HYDROMORPHONE HYDROCHLORIDE 0.5 MG: 1 INJECTION, SOLUTION INTRAMUSCULAR; INTRAVENOUS; SUBCUTANEOUS at 14:19

## 2024-09-25 RX ADMIN — Medication 100 MCG: at 12:05

## 2024-09-25 RX ADMIN — ONDANSETRON 4 MG: 2 INJECTION INTRAMUSCULAR; INTRAVENOUS at 13:25

## 2024-09-25 RX ADMIN — HYDROMORPHONE HYDROCHLORIDE 1 MG: 1 INJECTION, SOLUTION INTRAMUSCULAR; INTRAVENOUS; SUBCUTANEOUS at 13:31

## 2024-09-25 RX ADMIN — FENTANYL CITRATE 25 MCG: 50 INJECTION, SOLUTION INTRAMUSCULAR; INTRAVENOUS at 12:15

## 2024-09-25 RX ADMIN — GENTAMICIN SULFATE 330 MG: 40 INJECTION, SOLUTION INTRAMUSCULAR; INTRAVENOUS at 11:32

## 2024-09-25 RX ADMIN — ROCURONIUM BROMIDE 20 MG: 10 INJECTION, SOLUTION INTRAVENOUS at 13:01

## 2024-09-25 RX ADMIN — FENTANYL CITRATE 75 MCG: 50 INJECTION, SOLUTION INTRAMUSCULAR; INTRAVENOUS at 13:03

## 2024-09-25 RX ADMIN — DEXAMETHASONE SODIUM PHOSPHATE 8 MG: 4 INJECTION, SOLUTION INTRAMUSCULAR; INTRAVENOUS at 11:47

## 2024-09-25 RX ADMIN — SODIUM CHLORIDE, POTASSIUM CHLORIDE, SODIUM LACTATE AND CALCIUM CHLORIDE: 600; 310; 30; 20 INJECTION, SOLUTION INTRAVENOUS at 13:06

## 2024-09-25 RX ADMIN — ROCURONIUM BROMIDE 20 MG: 10 INJECTION, SOLUTION INTRAVENOUS at 12:42

## 2024-09-25 RX ADMIN — DEXMEDETOMIDINE HYDROCHLORIDE 10 MCG: 100 INJECTION, SOLUTION INTRAVENOUS at 13:37

## 2024-09-25 RX ADMIN — SODIUM CHLORIDE, POTASSIUM CHLORIDE, SODIUM LACTATE AND CALCIUM CHLORIDE 125 ML/HR: 600; 310; 30; 20 INJECTION, SOLUTION INTRAVENOUS at 10:05

## 2024-09-25 RX ADMIN — LIDOCAINE HYDROCHLORIDE 100 MG: 20 INJECTION, SOLUTION EPIDURAL; INFILTRATION; INTRACAUDAL; PERINEURAL at 11:43

## 2024-09-25 RX ADMIN — SCOPALAMINE 1 PATCH: 1 PATCH, EXTENDED RELEASE TRANSDERMAL at 10:05

## 2024-09-25 RX ADMIN — ROCURONIUM BROMIDE 10 MG: 10 INJECTION, SOLUTION INTRAVENOUS at 12:23

## 2024-09-25 RX ADMIN — ROCURONIUM BROMIDE 50 MG: 10 INJECTION, SOLUTION INTRAVENOUS at 11:43

## 2024-09-25 RX ADMIN — HYDROCODONE BITARTRATE AND ACETAMINOPHEN 1 TABLET: 5; 325 TABLET ORAL at 14:38

## 2024-09-25 RX ADMIN — MIDAZOLAM 2 MG: 1 INJECTION INTRAMUSCULAR; INTRAVENOUS at 11:36

## 2024-09-25 RX ADMIN — PROPOFOL 150 MCG/KG/MIN: 10 INJECTION, EMULSION INTRAVENOUS at 11:45

## 2024-09-25 NOTE — OP NOTE
Subjective     Date of Service:  09/25/24    Pre-operative diagnosis(es): Abnormal uterine bleeding, dysmenorrhea, chronic pelvic pain, uterine fibroids      Post-operative diagnosis(es): Same as above   Procedure(s): Total robotic hysterectomy, bilateral salpingectomy, and removal of nexplanon from the left arm          Surgeon:    Dr. Kimberly Moses MD   Assistant: Dr. Gary Dennison MD   EBL: 100 CC   Antibiotics: Clindamycin and gentamicin      Anesthesia:  General Anesthesia       Objective      Operative findings: Normal appearing external genitalia, normal appearing cervix.  On laparoscopy: Normal appearing liver edge. Fibroid uterus midline with apparent subserosal and submucosal fibroids present.  Normal appearing bilateral fallopian tubes and ovaries. No evidence of endometriosis implants.      Description of Procedure:       The patient received prophylactic antibiotics.  She was transferred to the operating room after being consented.  She was placed under general endotracheal anesthesia.  Attention was turned to the left arm. The nexplanon was palpated, the distal end was marked with sterile marker. The arm was prepped in usual fashion. A 15 blade scalpel was used to incise the skin overlying the end of the nexplanon, the kal was brought through this incised area and grasped with hemostats. The nexplanon was removed completely and without difficulty.     She was then placed in low lithotomy position using Lex stirrups and positioned on the Trenguard.   She was prepped and draped in sterile fashion. Hunt catheter was placed on the field. A Shilpa 2 cannula with 3.5 Koh ring and 8cm tip was used for uterine manipulation.    Gloves were exchanged and attention was turned to the abdomen. Direct entry was made using 5mm scope to the left upper quadrant, then pneumoperitoneum was established with opening pressure < 8mmHg.     Under direct visualization robotic ports were placed at the umbilicus for the  camera with 2 operating 8 millimeter ports to the right, followed by 1 8 mm operating port to the left and a 12 mm port in the far left flank for the assistant port.       Attention was turned to transecting the left round ligament using the vessel sealer. The ureter was visualized along the posterior leaf of the broad ligament. The fimbriae of the left fallopian tube was grasped, and the mesosalpinx was cauterized and cut, up to the level of the cornua using the vessel sealer. The uteroovarian ligament was also cauterized and cut without difficulty. Attention was turned to the bladder flap, where the bladder was sharply dissected off the cervix. The broad ligament was then sharply dissected to the level of the internal cervical os exposing the uterine vessels and they were cauterized and cut with good hemostasis following.       Attention was turned to the right side, where the steps were repeated performing right salpingectomy by cauterizing and cutting the mesosalpinx up up to the cornua using the vessel sealer. And cauterizing and cutting the uteroovarian ligament. Along the right side, the bladder flap was further developed with good visualization of the endocervical fascia. Incision was then made anteriorly and posteriorly overlying the Koh ring.The uterine vessels were skeletonized at the internal cervical os, cauterized and transected.  The vagina was then completely incised, freeing the uterus, bilateral fallopian tubes, and cervix. The specimen was removed through the vaginal canal with gentle retraction.      Attention was turned back to pelvis, electrocautery was used to confirm hemostasis along the vaginal cuff. The vaginal cuff was then closed with interrupted 2-0 Vicryl sutures x 4 in a figure of eight fashion.      At the conclusion of the procedure, the pelvis was irrigated and  hemostasis was confirmed. Pneumoperitoneum was reduced and confirmed hemostasis at all pedicles. The procedure was then  concluded and the robotic instruments were removed under direct visualization,  pneumoperitoneum was released completely, and the trocars were removed.   The incisions were closed with 4-0 Monocryl interrupted sutures in a subcuticular fashion, and covered with Mastisol and steri strips.      Sponge, lap, and needle counts were correct at the conclusion of the procedure        Dr. Dennison was the first assistant for the case.         Specimens removed:   Uterus, cervix, and bilateral fallopian tubes sent to pathology   Nexplanon was removed and discarded     Complications:   None     Condition:   stable     Disposition:   to PACU in stable condition to be discharged home                Kimberly Moses MD  09/25/24  13:45 EDT

## 2024-09-25 NOTE — H&P
Patient Care Team:  Shana Garcia APRN as PCP - General (Nurse Practitioner)    Chief complaint abnormal uterine bleeding, dysmenorrhea, chronic pelvic pain    Subjective     Patient is a 29 y.o. female G0 presents for definite surgical management due to long history of AUB with failure of menstrual control on OCPs, IUD, and nexplanon implant and she no longer desires to continue with hormonal options due to no change in bleeding or pelvic pain. She currently has nexplanon in place, she has not had any improvement in menstrual control despite additional progesterone therapy while taking nexplanon. Patient is not a candidate for estrogen based therapies due to her history of LE DVT. She had prior VTE event that occurred in 2010 in [place] following surgery on her ankle. She reported initially concern for PE following PNA but reports it was not a PE but she was given anticoagulation to prevent PE due to her clotting history following pneumonia infection. Patient denies wanting childbearing and she reports she is not interested in having an biological children and she desires definite surgical management. She has a history of chronic pelvic pain, she reports initial pain occurred following PID diagnosis in her teens. She has not been able to control pain with NSAIDs, gabapentin, and tylenol therapy.   Pap Nil 6/2024 and EMBX negative.   US: fibroid uterus 8.2x4.7x5cm.       Review of Systems   Pertinent items are noted in HPI    History  Past Medical History:   Diagnosis Date    ADHD 1995    Anesthesia complication     wakes up aggressive    Anxiety 2013    Asthma 2004    Back pain 2020    Back problem     Claustrophobia     Depression 2017    Headache 2013    PONV (postoperative nausea and vomiting)     Visual impairment 2003     Past Surgical History:   Procedure Laterality Date    FOOT SURGERY Left 2009    left foot had non cancer mass    FOOT SURGERY Left 2010    left foot had non cancer mass again     NOSE SURGERY Bilateral 08/17/2021    SINUS SURGERY  2021    TONSILLECTOMY AND ADENOIDECTOMY Bilateral 12/2020    WISDOM TOOTH EXTRACTION Bilateral 2017     Family History   Problem Relation Age of Onset    Arthritis Mother     Multiple sclerosis Mother     Thyroid disease Mother     Calcium disorder Mother     Cataracts Mother     Cancer Mother     Thyroid nodules Mother     Stroke Mother     Osteoporotic fracture Mother     Hypertension Father     Heart attack Father     Heart disease Father     Parkinsonism Father     Abnormal EKG Father     Allergies Father     Post-traumatic stress disorder Father     Stroke Father     Thyroid disease Brother     Calcium disorder Brother     Vision loss Brother     Kidney disease Maternal Grandmother     Kidney failure Maternal Grandmother     Cancer Maternal Grandfather     Throat cancer Maternal Grandfather     Stroke Paternal Grandmother     Heart disease Paternal Grandmother     Cancer Paternal Grandfather      Social History     Tobacco Use    Smoking status: Never    Smokeless tobacco: Never    Tobacco comments:     Exposed to second hand smoke as a child   Vaping Use    Vaping status: Never Used   Substance Use Topics    Alcohol use: Yes     Comment: only while on vacation    Drug use: Never       Allergies  Allergies   Allergen Reactions    Adhesive Tape Itching, Rash and Swelling    Aripiprazole Anxiety and Mental Status Change    Aspirin Itching, Swelling and GI Bleeding    Bee Venom Anaphylaxis, Hives, Itching, Swelling and Rash    Diclofenac Hives, Itching, Rash and Swelling     Other reaction(s): GI Bleeding    Diclofenac Potassium Hives, Itching, Swelling, Rash and GI Bleeding    Diflucan [Fluconazole] Anaphylaxis    Ibuprofen Itching, Swelling, Rash and GI Bleeding    Iodinated Contrast Media Hives, Itching, Swelling and Rash    Iodine Hives, Itching, Swelling and Rash    Keflex [Cephalexin] Itching, Swelling, Rash and GI Bleeding    Latex Hives, Itching, Swelling  and Rash    Levofloxacin Hives, Nausea And Vomiting and Swelling    Oxycodone-Acetaminophen Hives, Itching, Swelling, Rash and GI Bleeding    Penicillins Itching, Swelling, Rash and GI Bleeding    Promethazine Hives, Itching, Swelling and Rash    Qelbree [Viloxazine] GI Intolerance    Quetiapine Anxiety, Mental Status Change and Confusion    Shrimp Anaphylaxis, Hives, Itching, Swelling and Rash    Sulfa Antibiotics Hives, Itching, Nausea And Vomiting and Rash    Tape Itching, Swelling and Rash    Tramadol Swelling, Rash and GI Bleeding    Gabapentin Palpitations     And dizziness        Medications  Medications Prior to Admission   Medication Sig Dispense Refill Last Dose    albuterol (PROVENTIL) (2.5 MG/3ML) 0.083% nebulizer solution Take 1 vial by nebulization Every 4 (Four) Hours As Needed for Wheezing. 270 mL 12 9/20/2024    albuterol sulfate  (90 Base) MCG/ACT inhaler Inhale 2 puffs Every 4 (Four) Hours As Needed for Wheezing. 8 g 0 9/20/2024    albuterol sulfate  (90 Base) MCG/ACT inhaler Inhale 2 puffs Every 4 (Four) Hours As Needed for Wheezing. 18 g 0 9/20/2024    amphetamine-dextroamphetamine (Adderall) 10 MG tablet Take 1 tablet by mouth 2 (Two) Times a Day. 60 tablet 0 9/19/2024    Etonogestrel (NEXPLANON) 68 MG implant subdermal implant To be inserted one time by prescriber. Route Subdermal.   9/25/2024    nortriptyline (PAMELOR) 10 MG capsule Take 1 capsule by mouth nightly. 30 capsule 5 Past Month    SUMAtriptan (IMITREX) 50 MG tablet Take 1 tablet by mouth.       Vortioxetine HBr (Trintellix) 20 MG tablet Take 1 tablet by mouth Daily With Breakfast. 90 tablet 1 9/23/2024    brompheniramine-pseudoephedrine-DM 30-2-10 MG/5ML syrup Take 10 mL by mouth 4 (Four) Times a Day As Needed for Congestion, Cough or Allergies. 200 mL 0     Levomefolate Glucosamine 400 MCG capsule Take 1 capsule by mouth once daily. 30 capsule 3     lidocaine (XYLOCAINE) 5 % ointment apply 3 (Three) Times a Day As  "Needed (pain). 35.44 g 0     loperamide (IMODIUM A-D) 2 MG tablet Take 1 tablet by mouth.       Methylcobalamin 1000 MCG sublingual tablet Place 1 tablet under the tongue daily. 30 tablet 3     mupirocin (BACTROBAN) 2 % ointment Apply  topically to the appropriate area as directed 2 (Two) Times a Day. 22 g 0     norethindrone (AYGESTIN) 5 MG tablet Take 2 tablets by mouth Daily.       norethindrone (Aygestin) 5 MG tablet Take 1 tablet by mouth once daily, but may increase to twice daily for irregular bleeding. 60 tablet 1 9/23/2024    Semaglutide-Weight Management 2.4 MG/0.75ML solution auto-injector Inject 2.4 mg under the skin into the appropriate area as directed 1 (One) Time Per Week. 3 mL 5 More than a month    Spiriva Respimat 2.5 MCG/ACT aerosol solution inhaler Inhale 2 puffs Daily. 4 g 12 More than a month    triamcinolone (KENALOG) 0.1 % paste Apply to ulcers of the mouth/throat 2 (Two) Times a Day as directed. 5 g 1 More than a month    triamcinolone (KENALOG) 0.1 % paste Apply to aphthous ulcers twice daily. 5 g 1        Objective     Vital Signs  Vitals:    09/17/24 1532 09/25/24 0920   BP:  121/75   Patient Position:  Lying   Pulse:  85   Resp:  18   Temp:  97.9 °F (36.6 °C)   TempSrc:  Oral   SpO2:  100%   Weight: 81.6 kg (180 lb) 85.7 kg (189 lb)   Height: 162.6 cm (64\") 162.6 cm (64\")       Physical Exam:      General Appearance:    Alert, cooperative, in no acute distress   Lungs:     Clear to auscultation,respirations regular.    Heart:    Regular rhythm and normal rate.   Breast Exam:    Deferred   Abdomen:     no masses, soft non-tender, non-distended, no guarding, no rebound tenderness   Genitalia:    Deferred- see office note   Extremities:   Moves all extremities well, no edema, no cyanosis, no              redness   Pulses:   Pulses palpable and equal bilaterally       Results Review:      Lab Results (last 48 hours)       Procedure Component Value Units Date/Time    Protime-INR [[id number]]  " (Normal) Collected: 09/25/24 0834    Specimen: Blood Updated: 09/25/24 0921     Protime 11.0 Seconds      INR 1.01    aPTT [[id number]]  (Normal) Collected: 09/25/24 0834    Specimen: Blood Updated: 09/25/24 0921     PTT 29.9 seconds     Pregnancy, Urine - Urine, Clean Catch [[id number]]  (Normal) Collected: 09/25/24 0830    Specimen: Urine, Clean Catch Updated: 09/25/24 0908     HCG, Urine QL Negative             Imaging Results (Last 48 Hours)       ** No results found for the last 48 hours. **            Assessment & Plan       Abnormal uterine bleeding    28 y/o G0 with long history of AUB failed on medical management, dysmenorrhea, and chronic pelvic pain presents for definite surgical management with total robotic hysterectomy, bilateral salpingectomy, and she desires nexplanon removal (left arm) at time of surgery.     Patient counseled on risks of surgery: bleeding, infection, damage to surrounding structures including bowel /bladder, and ureter, prolonged sterling use, possible need of abdominal hysterectomy with large abdominal incision, prolonged hospital stay, and risk of anesthesia: VTE, and rare risk of death. Patient verbalized understanding of all risk factors.    Patient also counseled that surgery may not relieve her pain but will improve her bleeding, she was counseled on risks of abdominal surgery causing additional adhesions or adding to chronic pelvic pain syndrome.   Coags wnl on admission  UPT negative   Hb 15/PLTs 268k  Plan for outpatient management. Will discharge home with tylenol scheduled, norco prn, and zofran prn for nausea and vomiting.   Patient did have workup for clotting disorders and the labs were all wnl. Her prior VTE event was provoked due to surgery but will continue with lovenox anticoagulation PPX post surgery.        Kimberly Moses MD  09/25/24  11:01 EDT

## 2024-09-26 LAB
QT INTERVAL: 357 MS
QTC INTERVAL: 421 MS

## 2024-09-27 LAB
LAB AP CASE REPORT: NORMAL
PATH REPORT.FINAL DX SPEC: NORMAL
PATH REPORT.GROSS SPEC: NORMAL

## 2024-10-30 ENCOUNTER — APPOINTMENT (OUTPATIENT)
Dept: CT IMAGING | Facility: HOSPITAL | Age: 29
End: 2024-10-30
Payer: COMMERCIAL

## 2024-10-30 ENCOUNTER — HOSPITAL ENCOUNTER (EMERGENCY)
Facility: HOSPITAL | Age: 29
Discharge: HOME | End: 2024-10-30
Attending: EMERGENCY MEDICINE | Admitting: EMERGENCY MEDICINE
Payer: COMMERCIAL

## 2024-10-30 VITALS
HEIGHT: 64 IN | HEART RATE: 97 BPM | DIASTOLIC BLOOD PRESSURE: 68 MMHG | WEIGHT: 190.5 LBS | OXYGEN SATURATION: 98 % | RESPIRATION RATE: 18 BRPM | SYSTOLIC BLOOD PRESSURE: 114 MMHG | TEMPERATURE: 98 F | BODY MASS INDEX: 32.52 KG/M2

## 2024-10-30 DIAGNOSIS — R10.9 ABDOMINAL PAIN, UNSPECIFIED ABDOMINAL LOCATION: Primary | ICD-10-CM

## 2024-10-30 DIAGNOSIS — R10.2 PELVIC PAIN: ICD-10-CM

## 2024-10-30 LAB
ALBUMIN SERPL-MCNC: 4.3 G/DL (ref 3.5–5.2)
ALBUMIN/GLOB SERPL: 1.4 G/DL
ALP SERPL-CCNC: 42 U/L (ref 39–117)
ALT SERPL W P-5'-P-CCNC: 17 U/L (ref 1–33)
ANION GAP SERPL CALCULATED.3IONS-SCNC: 9.5 MMOL/L (ref 5–15)
AST SERPL-CCNC: 15 U/L (ref 1–32)
BASOPHILS # BLD AUTO: 0.03 10*3/MM3 (ref 0–0.2)
BASOPHILS NFR BLD AUTO: 0.5 % (ref 0–1.5)
BILIRUB SERPL-MCNC: 0.6 MG/DL (ref 0–1.2)
BILIRUB UR QL STRIP: NEGATIVE
BUN SERPL-MCNC: 14 MG/DL (ref 6–20)
BUN/CREAT SERPL: 18.9 (ref 7–25)
CALCIUM SPEC-SCNC: 9.3 MG/DL (ref 8.6–10.5)
CHLORIDE SERPL-SCNC: 100 MMOL/L (ref 98–107)
CLARITY UR: CLEAR
CO2 SERPL-SCNC: 26.5 MMOL/L (ref 22–29)
COLOR UR: YELLOW
CREAT SERPL-MCNC: 0.74 MG/DL (ref 0.57–1)
DEPRECATED RDW RBC AUTO: 40.9 FL (ref 37–54)
EGFRCR SERPLBLD CKD-EPI 2021: 112.5 ML/MIN/1.73
EOSINOPHIL # BLD AUTO: 0.2 10*3/MM3 (ref 0–0.4)
EOSINOPHIL NFR BLD AUTO: 3.5 % (ref 0.3–6.2)
ERYTHROCYTE [DISTWIDTH] IN BLOOD BY AUTOMATED COUNT: 12.6 % (ref 12.3–15.4)
GLOBULIN UR ELPH-MCNC: 3 GM/DL
GLUCOSE SERPL-MCNC: 93 MG/DL (ref 65–99)
GLUCOSE UR STRIP-MCNC: NEGATIVE MG/DL
HCT VFR BLD AUTO: 44 % (ref 34–46.6)
HGB BLD-MCNC: 14.7 G/DL (ref 12–15.9)
HGB UR QL STRIP.AUTO: NEGATIVE
IMM GRANULOCYTES # BLD AUTO: 0 10*3/MM3 (ref 0–0.05)
IMM GRANULOCYTES NFR BLD AUTO: 0 % (ref 0–0.5)
KETONES UR QL STRIP: ABNORMAL
LEUKOCYTE ESTERASE UR QL STRIP.AUTO: NEGATIVE
LYMPHOCYTES # BLD AUTO: 1.62 10*3/MM3 (ref 0.7–3.1)
LYMPHOCYTES NFR BLD AUTO: 28.7 % (ref 19.6–45.3)
MCH RBC QN AUTO: 29.1 PG (ref 26.6–33)
MCHC RBC AUTO-ENTMCNC: 33.4 G/DL (ref 31.5–35.7)
MCV RBC AUTO: 87 FL (ref 79–97)
MONOCYTES # BLD AUTO: 0.67 10*3/MM3 (ref 0.1–0.9)
MONOCYTES NFR BLD AUTO: 11.9 % (ref 5–12)
NEUTROPHILS NFR BLD AUTO: 3.12 10*3/MM3 (ref 1.7–7)
NEUTROPHILS NFR BLD AUTO: 55.4 % (ref 42.7–76)
NITRITE UR QL STRIP: NEGATIVE
PH UR STRIP.AUTO: 5.5 [PH] (ref 5–8)
PLATELET # BLD AUTO: 215 10*3/MM3 (ref 140–450)
PMV BLD AUTO: 9.3 FL (ref 6–12)
POTASSIUM SERPL-SCNC: 3.9 MMOL/L (ref 3.5–5.2)
PROT SERPL-MCNC: 7.3 G/DL (ref 6–8.5)
PROT UR QL STRIP: NEGATIVE
RBC # BLD AUTO: 5.06 10*6/MM3 (ref 3.77–5.28)
SODIUM SERPL-SCNC: 136 MMOL/L (ref 136–145)
SP GR UR STRIP: 1.02 (ref 1–1.03)
UROBILINOGEN UR QL STRIP: ABNORMAL
WBC NRBC COR # BLD AUTO: 5.64 10*3/MM3 (ref 3.4–10.8)

## 2024-10-30 PROCEDURE — 85025 COMPLETE CBC W/AUTO DIFF WBC: CPT | Performed by: NURSE PRACTITIONER

## 2024-10-30 PROCEDURE — 80053 COMPREHEN METABOLIC PANEL: CPT | Performed by: NURSE PRACTITIONER

## 2024-10-30 PROCEDURE — 74176 CT ABD & PELVIS W/O CONTRAST: CPT

## 2024-10-30 PROCEDURE — 99284 EMERGENCY DEPT VISIT MOD MDM: CPT

## 2024-10-30 PROCEDURE — 96360 HYDRATION IV INFUSION INIT: CPT

## 2024-10-30 PROCEDURE — 81003 URINALYSIS AUTO W/O SCOPE: CPT | Performed by: NURSE PRACTITIONER

## 2024-10-30 PROCEDURE — 99283 EMERGENCY DEPT VISIT LOW MDM: CPT | Performed by: NURSE PRACTITIONER

## 2024-10-30 PROCEDURE — 25810000003 SODIUM CHLORIDE 0.9 % SOLUTION: Performed by: NURSE PRACTITIONER

## 2024-10-30 RX ORDER — SODIUM CHLORIDE 0.9 % (FLUSH) 0.9 %
10 SYRINGE (ML) INJECTION AS NEEDED
Status: DISCONTINUED | OUTPATIENT
Start: 2024-10-30 | End: 2024-10-30 | Stop reason: HOSPADM

## 2024-10-30 RX ADMIN — SODIUM CHLORIDE 1000 ML: 9 INJECTION, SOLUTION INTRAVENOUS at 17:15

## 2024-10-30 NOTE — DISCHARGE INSTRUCTIONS
Call for a follow up appointment with your primary care for further evaluation and treatment.     Keep your appointment with OBGYN for further evaluation and treatment     Tylenol/Motrin as needed for pain/fevers    Make sure patient is drinking plenty of fluids.    Return for any new or worsening symptoms.      If you have increased fevers that do not respond to Tylenol or Motrin, if you develop nausea, vomiting  then you should be reevaluated.

## 2024-10-30 NOTE — FSED PROVIDER NOTE
Subjective   History of Present Illness  Patient is a 29-year-old female who presents to the ER with abnormal vaginal bleeding.  Patient reports she had a hysterectomy 9/25.  Started having vaginal bleeding this past Saturday.  Patient reports she has increased bleeding with sitting down or when she wipes.    History provided by:  Patient   used: No        Review of Systems   Genitourinary:  Positive for vaginal bleeding.       Past Medical History:   Diagnosis Date    ADHD 1995    Anesthesia complication     wakes up aggressive    Anxiety 2013    Asthma 2004    Back pain 2020    Back problem     Claustrophobia     Depression 2017    Headache 2013    PONV (postoperative nausea and vomiting)     Visual impairment 2003       Allergies   Allergen Reactions    Adhesive Tape Itching, Rash and Swelling    Aripiprazole Anxiety and Mental Status Change    Aspirin Itching, Swelling and GI Bleeding    Bee Venom Anaphylaxis, Hives, Itching, Swelling and Rash    Ct Contrast Hives    Diclofenac Hives, Itching, Rash and Swelling     Other reaction(s): GI Bleeding    Diflucan [Fluconazole] Anaphylaxis    Ibuprofen Itching, Swelling, Rash and GI Bleeding    Iodine Hives, Itching, Swelling and Rash    Keflex [Cephalexin] Itching, Swelling, Rash and GI Bleeding    Latex Hives, Itching, Swelling and Rash    Levofloxacin Hives, Nausea And Vomiting and Swelling    Oxycodone-Acetaminophen Hives, Itching, Swelling, Rash and GI Bleeding    Penicillins Itching, Swelling, Rash and GI Bleeding    Promethazine Hives, Itching, Swelling and Rash    Qelbree [Viloxazine] GI Intolerance    Quetiapine Anxiety, Mental Status Change and Confusion    Shrimp Anaphylaxis, Hives, Itching, Swelling and Rash    Sulfa Antibiotics Hives, Itching, Nausea And Vomiting and Rash    Tramadol Swelling, Rash and GI Bleeding    Gabapentin Palpitations     And dizziness        Past Surgical History:   Procedure Laterality Date    FOOT SURGERY Left  2009    left foot had non cancer mass    FOOT SURGERY Left 2010    left foot had non cancer mass again    NOSE SURGERY Bilateral 08/17/2021    SINUS SURGERY  2021    TONSILLECTOMY AND ADENOIDECTOMY Bilateral 12/2020    TOTAL LAPAROSCOPIC HYSTERECTOMY SALPINGECTOMY Bilateral 9/25/2024    Procedure: TOTAL LAPAROSCOPIC HYSTERECTOMY BILATERAL SALPINGECTOMY WITH DAVINCI ROBOT, NEXPLANON REMOVAL;  Surgeon: Kimberly Moses MD;  Location: [facility] MAIN OR;  Service: Robotics - DaVinci;  Laterality: Bilateral;    WISDOM TOOTH EXTRACTION Bilateral 2017       Family History   Problem Relation Age of Onset    Arthritis Mother     Multiple sclerosis Mother     Thyroid disease Mother     Calcium disorder Mother     Cataracts Mother     Cancer Mother     Thyroid nodules Mother     Stroke Mother     Osteoporotic fracture Mother     Hypertension Father     Heart attack Father     Heart disease Father     Parkinsonism Father     Abnormal EKG Father     Allergies Father     Post-traumatic stress disorder Father     Stroke Father     Thyroid disease Brother     Calcium disorder Brother     Vision loss Brother     Kidney disease Maternal Grandmother     Kidney failure Maternal Grandmother     Cancer Maternal Grandfather     Throat cancer Maternal Grandfather     Stroke Paternal Grandmother     Heart disease Paternal Grandmother     Cancer Paternal Grandfather        Social History     Socioeconomic History    Marital status:     Number of children: 0   Tobacco Use    Smoking status: Never    Smokeless tobacco: Never    Tobacco comments:     Exposed to second hand smoke as a child   Vaping Use    Vaping status: Never Used   Substance and Sexual Activity    Alcohol use: Yes     Comment: only while on vacation    Drug use: Never    Sexual activity: Yes     Partners: Male     Birth control/protection: Implant     Comment: Kasia           Objective   Physical Exam  Vitals and nursing note reviewed. Exam conducted with a chaperone  present.   Constitutional:       Appearance: Normal appearance.   HENT:      Head: Normocephalic.      Right Ear: Tympanic membrane and ear canal normal.      Left Ear: Tympanic membrane and ear canal normal.      Nose: Nose normal.      Mouth/Throat:      Lips: Pink.      Mouth: Mucous membranes are moist.      Pharynx: Oropharynx is clear. Uvula midline.   Eyes:      Conjunctiva/sclera: Conjunctivae normal.      Pupils: Pupils are equal, round, and reactive to light.   Cardiovascular:      Rate and Rhythm: Normal rate and regular rhythm.      Pulses: Normal pulses.      Heart sounds: Normal heart sounds.   Pulmonary:      Effort: Pulmonary effort is normal.      Breath sounds: Normal breath sounds and air entry.   Abdominal:      General: Bowel sounds are normal.      Palpations: Abdomen is soft.      Tenderness: There is abdominal tenderness.      Comments: Patient with generalized abdominal pain on palpation.   Genitourinary:     Exam position: Lithotomy position.      Vagina: Normal. No tenderness or bleeding.      Cervix: Normal. No cervical motion tenderness or cervical bleeding.      Comments: Patient with no vaginal bleeding noted on exam.  Patient has no blood noted in the vaginal vault.  Musculoskeletal:         General: Normal range of motion.      Cervical back: Full passive range of motion without pain, normal range of motion and neck supple.   Skin:     General: Skin is warm and dry.   Neurological:      General: No focal deficit present.      Mental Status: She is alert and oriented to person, place, and time.   Psychiatric:         Mood and Affect: Mood normal.         Behavior: Behavior normal. Behavior is cooperative.         Procedures           ED Course  ED Course as of 10/30/24 1901   Wed Oct 30, 2024   1728 Ketones, UA(!): 15 mg/dL (1+) [DS]   1731 WBC: 5.64 [DS]   1731 Hemoglobin: 14.7 [DS]   1731 Hematocrit: 44.0 [DS]   1751 Glucose: 93 [DS]   1751 BUN: 14 [DS]   1751 Creatinine: 0.74 [DS]    1751 Sodium: 136 [DS]   1751 Potassium: 3.9 [DS]   1852 CT ABDOMEN PELVIS WO CONTRAST     Date of Exam: 10/30/2024 6:22 PM EDT     Indication: abdominal pain. hysterectomy 09/25, vaginal bleeding.     Comparison: None available.     Technique: Axial CT images were obtained of the abdomen and pelvis without the administration of contrast. Sagittal and coronal reconstructions were performed.  Automated exposure control and iterative reconstruction methods were used.     FINDINGS:     Lung bases: No masses. No consolidation.     Liver:No masses. No intrahepatic biliary ductal dilatation.     Spleen:No masses. No perisplenic hematoma.     Pancreas:No pancreatic masses. No evidence of pancreatitis.     Gallbladder and common bile duct:No evidence of cholelithiasis. No evidence of cholecystitis.     Adrenal glands:No adrenal masses     Kidneys and ureters:No kidney stones. No renal masses.No calculi present within the ureters. Normal caliber ureters.     Urinary bladder:No urinary bladder wall thickening. No bladder masses.     Small bowel:Normal caliber small bowel. Scattered nonspecific small bowel loops without small bowel obstruction.     Large bowel:No diverticulosis or diverticulitis. No large bowel masses are appreciated     Appendix: Normal     GENITOURINARY: Prior hysterectomy     Ascites or pneumoperitoneum:None.     Adenopathy:None present     Osseous structures: The pubic bones are intact. The proximal femurs are intact. Lumbar vertebral body height and alignment are normal. No lytic or blastic disease.     Other findings: None     IMPRESSION:  No acute abdominal or pelvic pathology. Prior hysterectomy.         [DS]      ED Course User Index  [DS] Gem Solis APRN                                           Medical Decision Making  Patient is a 29-year-old female who presents to the ER with abnormal vaginal bleeding.  Patient reports she had a hysterectomy 9/25.  Started having vaginal bleeding this  past Saturday.  Patient reports she has increased bleeding with sitting down or when she wipes    Patient presents with vaginal bleeding, had hysterectomy on 09/25 started having vaginal bleeding on Saturday when she sits, or when she wipes.  Based on History, Exam, and ED Workup patient's presentation not consistent with  life-threatening coagulopathy, trauma, serious bacterial infection. Patient advised to follow up with OBGYN as scheduled on Monday     Problems Addressed:  Abdominal pain, unspecified abdominal location: complicated acute illness or injury  Pelvic pain: complicated acute illness or injury    Amount and/or Complexity of Data Reviewed  Labs: ordered. Decision-making details documented in ED Course.  Radiology: ordered. Decision-making details documented in ED Course.    Risk  OTC drugs.        Final diagnoses:   Abdominal pain, unspecified abdominal location   Pelvic pain       ED Disposition  ED Disposition       ED Disposition   Discharge    Condition   Stable    Comment   --               Shana Garcia, APRN  [address]  [address] [address] [phone]      As needed, If symptoms worsen         Medication List      No changes were made to your prescriptions during this visit.

## 2024-11-13 DIAGNOSIS — F90.0 ADHD, PREDOMINANTLY INATTENTIVE TYPE: Chronic | ICD-10-CM

## 2024-11-13 RX ORDER — DEXTROAMPHETAMINE SACCHARATE, AMPHETAMINE ASPARTATE, DEXTROAMPHETAMINE SULFATE AND AMPHETAMINE SULFATE 2.5; 2.5; 2.5; 2.5 MG/1; MG/1; MG/1; MG/1
10 TABLET ORAL 2 TIMES DAILY
Qty: 60 TABLET | Refills: 0 | Status: CANCELLED | OUTPATIENT
Start: 2024-11-13 | End: 2025-11-13

## 2024-11-14 DIAGNOSIS — F90.0 ADHD, PREDOMINANTLY INATTENTIVE TYPE: Chronic | ICD-10-CM

## 2024-11-14 NOTE — TELEPHONE ENCOUNTER
Rx Refill Note  Requested Prescriptions     Pending Prescriptions Disp Refills    amphetamine-dextroamphetamine (Adderall) 10 MG tablet 60 tablet 0     Sig: Take 1 tablet by mouth 2 (Two) Times a Day.      Last office visit with prescribing clinician: 9/13/2024   Last telemedicine visit with prescribing clinician: Visit date not found   Next office visit with prescribing clinician: 1/15/2025   Office Visit with Rae Chatterjee MD (09/13/2024)   [facility] Abbreviated Urine Drug Screen - (01/05/2024)                     Would you like a call back once the refill request has been completed: [] Yes [] No    If the office needs to give you a call back, can they leave a voicemail: [] Yes [] No    Gracia Celeste MA  11/14/24, 11:02 EST  UPLOADED

## 2024-11-15 RX ORDER — DEXTROAMPHETAMINE SACCHARATE, AMPHETAMINE ASPARTATE, DEXTROAMPHETAMINE SULFATE AND AMPHETAMINE SULFATE 2.5; 2.5; 2.5; 2.5 MG/1; MG/1; MG/1; MG/1
10 TABLET ORAL 2 TIMES DAILY
Qty: 60 TABLET | Refills: 0 | Status: SHIPPED | OUTPATIENT
Start: 2024-11-15 | End: 2025-11-15

## 2025-01-14 DIAGNOSIS — F90.0 ADHD, PREDOMINANTLY INATTENTIVE TYPE: Chronic | ICD-10-CM

## 2025-01-14 RX ORDER — DEXTROAMPHETAMINE SACCHARATE, AMPHETAMINE ASPARTATE, DEXTROAMPHETAMINE SULFATE AND AMPHETAMINE SULFATE 2.5; 2.5; 2.5; 2.5 MG/1; MG/1; MG/1; MG/1
10 TABLET ORAL 2 TIMES DAILY
Qty: 60 TABLET | Refills: 0 | Status: CANCELLED | OUTPATIENT
Start: 2025-01-14 | End: 2026-01-14

## 2025-01-15 ENCOUNTER — OFFICE VISIT (OUTPATIENT)
Dept: PSYCHIATRY | Facility: CLINIC | Age: 30
End: 2025-01-15
Payer: COMMERCIAL

## 2025-01-15 DIAGNOSIS — F33.1 MAJOR DEPRESSIVE DISORDER, RECURRENT EPISODE, MODERATE: Primary | Chronic | ICD-10-CM

## 2025-01-15 DIAGNOSIS — F43.10 PTSD (POST-TRAUMATIC STRESS DISORDER): ICD-10-CM

## 2025-01-15 DIAGNOSIS — Z79.899 ENCOUNTER FOR LONG-TERM (CURRENT) USE OF MEDICATIONS: ICD-10-CM

## 2025-01-15 DIAGNOSIS — F90.0 ADHD, PREDOMINANTLY INATTENTIVE TYPE: Chronic | ICD-10-CM

## 2025-01-15 RX ORDER — DEXTROAMPHETAMINE SACCHARATE, AMPHETAMINE ASPARTATE, DEXTROAMPHETAMINE SULFATE AND AMPHETAMINE SULFATE 2.5; 2.5; 2.5; 2.5 MG/1; MG/1; MG/1; MG/1
10 TABLET ORAL 2 TIMES DAILY
Qty: 60 TABLET | Refills: 0 | Status: CANCELLED | OUTPATIENT
Start: 2025-01-14 | End: 2026-01-14

## 2025-01-15 RX ORDER — DEXTROAMPHETAMINE SACCHARATE, AMPHETAMINE ASPARTATE, DEXTROAMPHETAMINE SULFATE AND AMPHETAMINE SULFATE 2.5; 2.5; 2.5; 2.5 MG/1; MG/1; MG/1; MG/1
10 TABLET ORAL 2 TIMES DAILY
Qty: 60 TABLET | Refills: 0 | Status: SHIPPED | OUTPATIENT
Start: 2025-01-15 | End: 2026-01-15

## 2025-01-15 RX ORDER — VORTIOXETINE 20 MG/1
20 TABLET, FILM COATED ORAL
Qty: 90 TABLET | Refills: 1 | Status: SHIPPED | OUTPATIENT
Start: 2025-01-15
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
## 2025-01-22 LAB
1OH-MIDAZOLAM UR QL SCN: NOT DETECTED NG/MG CREAT
6MAM UR QL SCN: NEGATIVE NG/ML
7AMINOCLONAZEPAM/CREAT UR: NOT DETECTED NG/MG CREAT
8OH-AMOXAPINE UR QL: NOT DETECTED
8OH-LOXAPINE UR QL SCN: NOT DETECTED
A-OH ALPRAZ/CREAT UR: NOT DETECTED NG/MG CREAT
A-OH-TRIAZOLAM/CREAT UR CFM: NOT DETECTED NG/MG CREAT
ALPRAZ/CREAT UR CFM: NOT DETECTED NG/MG CREAT
AMITRIP UR-MCNC: NOT DETECTED NG/ML
AMOXAPINE UR QL: NOT DETECTED
AMPHET UR CFM-MCNC: 2437 NG/MG CREAT
AMPHETAMINES UR QL SCN>500 NG/ML: NORMAL NG/ML
AMPHETAMINES UR QL: NORMAL
ANTICONVULSANTS UR: NEGATIVE
ANTIPSYCHOTICS UR: NEGATIVE
ARIPIPRAZOLE UR QL SCN: NOT DETECTED
ASENAPINE UR QL CFM: NOT DETECTED
BARBITURATES UR QL SCN: NEGATIVE NG/ML
BENZODIAZ SCN METH UR: NEGATIVE
BUPRENORPHINE UR QL SCN: NEGATIVE NG/ML
BUPROPION UR QL: NOT DETECTED
CANNABINOIDS UR QL SCN: NEGATIVE NG/ML
CARISOPRODOL UR QL: NEGATIVE NG/ML
CHLORPROMAZINE UR QL: NOT DETECTED
CITALOPRAM, UR: NOT DETECTED
CLOMIPRAMINE UR-MCNC: NOT DETECTED NG/ML
CLONAZEPAM/CREAT UR CFM: NOT DETECTED NG/MG CREAT
CLOZAPINE UR QL: NOT DETECTED
COCAINE+BZE UR QL SCN: NEGATIVE NG/ML
CREAT UR-MCNC: 204 MG/DL
DESALKYLFLURAZ/CREAT UR: NOT DETECTED NG/MG CREAT
DESIPRAMINE UR-MCNC: NOT DETECTED NG/ML
DIAZEPAM/CREAT UR: NOT DETECTED NG/MG CREAT
DOXEPIN UR-MCNC: NOT DETECTED NG/ML
DULOXETINE UR QL: NOT DETECTED
ETG ETS UR QL CFM: NORMAL
ETHANOL UR QL SCN: NORMAL NG/ML
ETHYL GLUCURONIDE UR CFM-MCNC: 701 NG/MG CREAT
ETHYL SULFATE UR CFM-MCNC: 279 NG/MG CREAT
FENTANYL UR QL SCN: NEGATIVE NG/ML
FLUNITRAZEPAM UR QL SCN: NOT DETECTED NG/MG CREAT
FLUOXETINE UR QL SCN: NOT DETECTED
FLUPHENAZINE UR-MCNC: NOT DETECTED NG/ML
FLUVOXAMINE UR QL: NOT DETECTED
GABAPENTIN UR-MCNC: NEGATIVE UG/ML
HALOPERIDOL UR QL: NOT DETECTED
ILOPERIDONE UR QL CFM: NOT DETECTED
IMIPRAMINE UR-MCNC: NOT DETECTED NG/ML
LORAZEPAM/CREAT UR: NOT DETECTED NG/MG CREAT
LOXAPINE UR QL: NOT DETECTED
LURASIDONE UR QL CFM: NOT DETECTED
MAPROTILINE UR QL: NOT DETECTED
MDA UR CFM-MCNC: NOT DETECTED NG/MG CREAT
MDMA UR CFM-MCNC: NOT DETECTED NG/MG CREAT
ME-PHENIDATE UR QL SCN: NEGATIVE NG/ML
MESORIDAZINE UR QL: NOT DETECTED
METHADONE UR QL SCN: NEGATIVE NG/ML
METHADONE+METAB UR QL SCN: NEGATIVE NG/ML
METHAMPHET UR CFM-MCNC: NOT DETECTED NG/MG CREAT
MIDAZOLAM/CREAT UR CFM: NOT DETECTED NG/MG CREAT
MIRTAZAPINE UR-MCNC: NOT DETECTED UG/ML
MITRAGYNINE UR QL SCN: NEGATIVE NG/ML
MOLINDONE UR QL SCN: NOT DETECTED
NEFAZODONE UR QL: NOT DETECTED
NORCITALOPRAM UR QL: NOT DETECTED
NORCLOMIPRAMINE UR QL: NOT DETECTED
NORCLOZAPINE UR QL: NOT DETECTED
NORDIAZEPAM/CREAT UR: NOT DETECTED NG/MG CREAT
NORDOXEPIN UR QL: NOT DETECTED
NORFLUNITRAZEPAM UR-MCNC: NOT DETECTED NG/MG CREAT
NORFLUOXETINE UR-MCNC: NOT DETECTED NG/ML
NORSERTRALINE UR QL: NOT DETECTED
NORTRIP UR-MCNC: NOT DETECTED NG/ML
ODV UR-MCNC: NOT DETECTED NG/ML
OH-BUPROPION UR-MCNC: NOT DETECTED NG/ML
OLANZAPINE UR CFM-MCNC: NOT DETECTED NG/ML
OPIATES UR SCN-MCNC: NEGATIVE NG/ML
OXAZEPAM/CREAT UR: NOT DETECTED NG/MG CREAT
OXYCODONE CTO UR SCN-MCNC: NEGATIVE NG/ML
PAROXETINE UR-MCNC: NOT DETECTED NG/L
PCP UR QL SCN: NEGATIVE NG/ML
PERPHENAZINE UR QL: NOT DETECTED
PIMOZIDE, UR: NOT DETECTED
PREGABALIN UR QL SCN: NOT DETECTED
PRESCRIBED MEDICATIONS: NORMAL
PROCHLORPERAZINE UR QL: NOT DETECTED
PROPOXYPH UR QL SCN: NEGATIVE NG/ML
PROTRIP UR QL: NOT DETECTED
QUETIAPINE CTO UR CFM-MCNC: NOT DETECTED NG/ML
RISPERIDONE UR QL: NOT DETECTED
SERTRALINE UR-MCNC: NOT DETECTED NG/ML
TAPENTADOL CTO UR SCN-MCNC: NEGATIVE NG/ML
TEMAZEPAM/CREAT UR: NOT DETECTED NG/MG CREAT
THIORIDAZINE UR-MCNC: NOT DETECTED UG/ML
THIOTHIXENE UR QL: NOT DETECTED
TRAMADOL UR QL SCN: NEGATIVE NG/ML
TRAZODONE UR QL: NOT DETECTED
TRAZODONE UR-MCNC: NOT DETECTED UG/ML
TRICYCLICS TESTED UR SCN: NEGATIVE
TRIFPERAZINE UR QL: NOT DETECTED
TRIMIPRAMINE UR QL: NOT DETECTED
VENLAFAXINE UR QL: NOT DETECTED
VILAZ UR QL SCN: NOT DETECTED
ZIPRASIDONE UR QL SCN: NOT DETECTED

## 2025-02-14 ENCOUNTER — OFFICE VISIT (OUTPATIENT)
Dept: BARIATRICS/WEIGHT MGMT | Facility: CLINIC | Age: 30
End: 2025-02-14
Payer: COMMERCIAL

## 2025-02-14 VITALS
HEART RATE: 92 BPM | DIASTOLIC BLOOD PRESSURE: 93 MMHG | WEIGHT: 199 LBS | HEIGHT: 64 IN | BODY MASS INDEX: 33.97 KG/M2 | SYSTOLIC BLOOD PRESSURE: 128 MMHG | TEMPERATURE: 97.5 F

## 2025-02-14 DIAGNOSIS — J45.20 MILD INTERMITTENT ASTHMA WITHOUT COMPLICATION: ICD-10-CM

## 2025-02-14 DIAGNOSIS — R11.0 CHRONIC NAUSEA: ICD-10-CM

## 2025-02-14 DIAGNOSIS — E66.811 OBESITY, CLASS I, BMI 30-34.9: Primary | ICD-10-CM

## 2025-02-14 DIAGNOSIS — R53.82 CHRONIC FATIGUE: ICD-10-CM

## 2025-02-14 DIAGNOSIS — N93.9 ABNORMAL UTERINE BLEEDING: ICD-10-CM

## 2025-02-14 PROBLEM — G43.909 MIGRAINE WITHOUT STATUS MIGRAINOSUS, NOT INTRACTABLE: Status: ACTIVE | Noted: 2024-09-06

## 2025-02-14 RX ORDER — SEMAGLUTIDE 0.5 MG/.5ML
0.5 INJECTION, SOLUTION SUBCUTANEOUS WEEKLY
Qty: 2 ML | Refills: 0 | Status: SHIPPED | OUTPATIENT
Start: 2025-03-14

## 2025-02-14 RX ORDER — SEMAGLUTIDE 0.25 MG/.5ML
0.25 INJECTION, SOLUTION SUBCUTANEOUS WEEKLY
Qty: 2 ML | Refills: 0 | Status: SHIPPED | OUTPATIENT
Start: 2025-02-14

## 2025-02-14 NOTE — PROGRESS NOTES
"[facility] BARIATRIC SURGERY  [address]  [facility] [id number] [phone]  [address]  [facility] [address] [phone]  Dept: [phone]  2/14/2025      Anayeli Burdick.  [id number]  [id number]  1995  female      Chief Complaint   Patient presents with    Follow-up     Follow up Rx         Weight Loss Medicine:   Anayeli Burdick presents today for follow up today for provider supervised medical weight loss.    HPI:   Today's weight is 90.3 kg (199 lb) pounds, today's BMI is Body mass index is 34.14 kg/m²., she has a  gain of 17 pounds since the last visit.     Anayeli Burdick has been off medication for several months.  Had a hysterectomy with bso on 9/25/24 and has been off medication since prior to that.  Is struggling with hormone regulation since surgery.  States cannot take estrogen due to h/o dvts.  She was instructed to take other organic supplements to try and help with hormone fluctuations.  Has noticed that those medications cause nausea with vomiting.  Has been taking Zofran \"like tictacs\" in order to deal with the nausea.  Has had some increase of dyspepsia and upset stomach.  Organic supplements include vaginal probiotic, Menopause vitamin with ashwaganda and black cohosh, a vaginal moisture supplement with fenugreek and a hair growth vitamin.      Lots of emotional swings since hysterectomy.  States has been snacking and eating more often due to emotional eating.  Has been snacking on pineapple and pretzels right now.      Lives with mom and dad.  Sometimes mom leaves to visit her brother and she finds that she snacks more.  When mom is here, mom packs her food for when she goes to work.  On those days she eats salad and protein.    Dad is still here living with her.  Had stress test that he failed.  Potential for open heart.  Now he is starting to worry about his weight.  Her stress level is " "high.     Has noticed that when she eats food it does help with nausea. Does find that some times she is taking Adderall on and empty stomach and then drinking coffee before work.  Does notice increased dyspepsia and \"upset stomach\".        She is drinking water, sparkling water, kimbucha, coffee.  Does commonly drink kombucha and coffee on empty stomach.  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  Has been working 8 days on with 2 days off but thinking about going back to working 13 days on and 1 day off.        Review of Systems   Constitutional:  Positive for activity change, appetite change and fatigue.   Respiratory:  Negative for chest tightness and shortness of breath.    Cardiovascular:  Negative for chest pain and palpitations.   Gastrointestinal:  Positive for abdominal pain, nausea and vomiting.   Musculoskeletal:  Positive for arthralgias.   Psychiatric/Behavioral:  Positive for dysphoric mood.    All other systems reviewed and are negative.      Patient Active Problem List   Diagnosis    ADHD, predominantly inattentive type    Asthma    Major depressive disorder, recurrent episode, moderate    PTSD (post-traumatic stress disorder)    Chronic back pain    Dietary counseling    Obesity, Class I, BMI 30-34.9    Pain in thoracic spine    Neck pain    Allergic reaction    Abnormal uterine bleeding    Dysmenorrhea    Chronic pelvic pain in female    Migraine without status migrainosus, not intractable    Chronic fatigue    Chronic nausea       Past Medical History:   Diagnosis Date    ADHD 1995    Anesthesia complication     wakes up aggressive    Anxiety 2013    Asthma 2004    Back pain 2020    Back problem     Claustrophobia     Depression 2017    Headache 2013    PONV (postoperative nausea and vomiting)     Visual impairment 2003       The following portions of the patient's history were reviewed and updated as appropriate: allergies, current medications, past medical " history, past surgical history, and problem list.    Vitals:    02/14/25 1330   BP: 128/93   Pulse: 92   Temp: 97.5 °F (36.4 °C)       Physical Exam  Vitals reviewed.   Constitutional:       General: She is not in acute distress.     Appearance: Normal appearance. She is morbidly obese and obese.   HENT:      Head: Normocephalic and atraumatic.      Mouth/Throat:      Mouth: Mucous membranes are moist.      Pharynx: Oropharynx is clear.   Eyes:      General: No scleral icterus.     Extraocular Movements: Extraocular movements intact.      Conjunctiva/sclera: Conjunctivae normal.      Pupils: Pupils are equal, round, and reactive to light.   Cardiovascular:      Rate and Rhythm: Normal rate and regular rhythm.   Pulmonary:      Effort: Pulmonary effort is normal. No respiratory distress.   Abdominal:      General: Bowel sounds are normal.      Palpations: Abdomen is soft.   Musculoskeletal:         General: Normal range of motion.      Cervical back: Normal range of motion and neck supple.   Skin:     General: Skin is warm and dry.   Neurological:      General: No focal deficit present.      Mental Status: She is alert and oriented to person, place, and time.   Psychiatric:         Mood and Affect: Mood normal.         Behavior: Behavior normal.         Thought Content: Thought content normal.         Judgment: Judgment normal.         Assessment:   The patient is struggling with change in hormones after hysterectomy and weight gain since stopping glp1.       Plan:   Diagnoses and all orders for this visit:    1. Obesity, Class I, BMI 30-34.9 (Primary)  -     CBC & Differential; Future  -     Comprehensive Metabolic Panel; Future  -     Ferritin; Future  -     Folate; Future  -     Iron; Future  -     Prealbumin; Future  -     Methylmalonic Acid, Serum; Future  -     Vitamin B1, Whole Blood; Future  -     Vitamin D,25-Hydroxy; Future  -     Amylase; Future  -     Lipase; Future    2. Abnormal uterine bleeding  -      CBC & Differential; Future  -     Comprehensive Metabolic Panel; Future  -     Ferritin; Future  -     Folate; Future  -     Iron; Future  -     Prealbumin; Future  -     Methylmalonic Acid, Serum; Future  -     Vitamin B1, Whole Blood; Future  -     Vitamin D,25-Hydroxy; Future  -     Amylase; Future  -     Lipase; Future    3. Mild intermittent asthma without complication  -     CBC & Differential; Future  -     Comprehensive Metabolic Panel; Future  -     Ferritin; Future  -     Folate; Future  -     Iron; Future  -     Prealbumin; Future  -     Methylmalonic Acid, Serum; Future  -     Vitamin B1, Whole Blood; Future  -     Vitamin D,25-Hydroxy; Future  -     Amylase; Future  -     Lipase; Future    4. Chronic nausea  -     CBC & Differential; Future  -     Comprehensive Metabolic Panel; Future  -     Ferritin; Future  -     Folate; Future  -     Iron; Future  -     Prealbumin; Future  -     Methylmalonic Acid, Serum; Future  -     Vitamin B1, Whole Blood; Future  -     Vitamin D,25-Hydroxy; Future  -     Amylase; Future  -     Lipase; Future    5. Chronic fatigue  -     CBC & Differential; Future  -     Comprehensive Metabolic Panel; Future  -     Ferritin; Future  -     Folate; Future  -     Iron; Future  -     Prealbumin; Future  -     Methylmalonic Acid, Serum; Future  -     Vitamin B1, Whole Blood; Future  -     Vitamin D,25-Hydroxy; Future  -     Amylase; Future  -     Lipase; Future    Other orders  -     Semaglutide-Weight Management (Wegovy) 0.25 MG/0.5ML solution auto-injector; Inject 0.5 mL under the skin into the appropriate area as directed 1 (One) Time Per Week. Inject 25 units under the skin once weekly  Dispense: 2 mL; Refill: 0  -     Semaglutide-Weight Management (Wegovy) 0.5 MG/0.5ML solution auto-injector; Inject 0.5 mL under the skin into the appropriate area as directed 1 (One) Time Per Week. Inject 50 units under the skin weekly  Dispense: 2 mL; Refill: 0      Discussed risks of excessive  caffeine intake while on stimulant medication.  Discussed that dyspepsia that she is feeling is likely due to coffee, stimulant, kombucha on an empty stomach.  Even though she wants to lose weight, it is still important that she be consistent about feeding herself.  Has had great weight loss in the past.    Will send in first 2 months of semaglutide. She will let me know how she is doing and if wants to go up on dose after that.    Discussed that sleep and taking care of herself is important to her overall health as well.    Discussed that black cohosh and ashwaganda are both linked to nausea.  Does admit that if she forgets to take it she doesn't have as much nausea.  Stop taking and discuss with gyn.  Will get labs  Encouraged patient to be sure to get plenty of lean protein per day through small frequent meals all with a protein source.   Activity restrictions: none.   Recommended patient be sure to get at least 70 grams of protein per day by eating small, frequent meals all with high lean protein choices. Be sure to limit/cut back on daily carbohydrate intake. Discussed with the patient the recommended amount of water per day to intake- half of body weight in ounces. Reviewed vitamin requirements. Be sure to do routine exercise, 150 minutes per week minimum, including both cardio and strength training.     Instructions / Recommendations: dietary counseling recommended, recommended a daily protein intake of  grams, vitamin supplement(s) recommended, recommended exercising at least 150 minutes per week, behavior modifications recommended and instructed to call the office for concerns, questions, or problems.     The patient was instructed to follow up in 6 months.     The patient was counseled regarding. Total time spent during this encounter today was 45 minutes.

## 2025-02-19 ENCOUNTER — LAB (OUTPATIENT)
Dept: LAB | Facility: HOSPITAL | Age: 30
End: 2025-02-19
Payer: COMMERCIAL

## 2025-02-19 DIAGNOSIS — R11.0 CHRONIC NAUSEA: ICD-10-CM

## 2025-02-19 DIAGNOSIS — R53.82 CHRONIC FATIGUE: ICD-10-CM

## 2025-02-19 DIAGNOSIS — N93.9 ABNORMAL UTERINE BLEEDING: ICD-10-CM

## 2025-02-19 DIAGNOSIS — E66.811 OBESITY, CLASS I, BMI 30-34.9: ICD-10-CM

## 2025-02-19 DIAGNOSIS — J45.20 MILD INTERMITTENT ASTHMA WITHOUT COMPLICATION: ICD-10-CM

## 2025-02-19 LAB
25(OH)D3 SERPL-MCNC: 19.3 NG/ML (ref 30–100)
ALBUMIN SERPL-MCNC: 4.1 G/DL (ref 3.5–5.2)
ALBUMIN/GLOB SERPL: 1.5 G/DL
ALP SERPL-CCNC: 41 U/L (ref 39–117)
ALT SERPL W P-5'-P-CCNC: 16 U/L (ref 1–33)
AMYLASE SERPL-CCNC: 54 U/L (ref 28–100)
ANION GAP SERPL CALCULATED.3IONS-SCNC: 10.6 MMOL/L (ref 5–15)
AST SERPL-CCNC: 17 U/L (ref 1–32)
BASOPHILS # BLD AUTO: 0.02 10*3/MM3 (ref 0–0.2)
BASOPHILS NFR BLD AUTO: 0.3 % (ref 0–1.5)
BILIRUB SERPL-MCNC: 0.4 MG/DL (ref 0–1.2)
BUN SERPL-MCNC: 16 MG/DL (ref 6–20)
BUN/CREAT SERPL: 21.6 (ref 7–25)
CALCIUM SPEC-SCNC: 8.7 MG/DL (ref 8.6–10.5)
CHLORIDE SERPL-SCNC: 100 MMOL/L (ref 98–107)
CO2 SERPL-SCNC: 26.4 MMOL/L (ref 22–29)
CREAT SERPL-MCNC: 0.74 MG/DL (ref 0.57–1)
DEPRECATED RDW RBC AUTO: 39.3 FL (ref 37–54)
EGFRCR SERPLBLD CKD-EPI 2021: 111.8 ML/MIN/1.73
EOSINOPHIL # BLD AUTO: 0.16 10*3/MM3 (ref 0–0.4)
EOSINOPHIL NFR BLD AUTO: 2.2 % (ref 0.3–6.2)
ERYTHROCYTE [DISTWIDTH] IN BLOOD BY AUTOMATED COUNT: 12.2 % (ref 12.3–15.4)
FERRITIN SERPL-MCNC: 67.9 NG/ML (ref 13–150)
FOLATE SERPL-MCNC: 15.6 NG/ML (ref 4.78–24.2)
GLOBULIN UR ELPH-MCNC: 2.8 GM/DL
GLUCOSE SERPL-MCNC: 81 MG/DL (ref 65–99)
HCT VFR BLD AUTO: 41.1 % (ref 34–46.6)
HGB BLD-MCNC: 13.3 G/DL (ref 12–15.9)
IMM GRANULOCYTES # BLD AUTO: 0.02 10*3/MM3 (ref 0–0.05)
IMM GRANULOCYTES NFR BLD AUTO: 0.3 % (ref 0–0.5)
IRON 24H UR-MRATE: 54 MCG/DL (ref 37–145)
LIPASE SERPL-CCNC: 54 U/L (ref 13–60)
LYMPHOCYTES # BLD AUTO: 2.54 10*3/MM3 (ref 0.7–3.1)
LYMPHOCYTES NFR BLD AUTO: 34.3 % (ref 19.6–45.3)
MCH RBC QN AUTO: 28.2 PG (ref 26.6–33)
MCHC RBC AUTO-ENTMCNC: 32.4 G/DL (ref 31.5–35.7)
MCV RBC AUTO: 87.3 FL (ref 79–97)
MONOCYTES # BLD AUTO: 0.73 10*3/MM3 (ref 0.1–0.9)
MONOCYTES NFR BLD AUTO: 9.9 % (ref 5–12)
NEUTROPHILS NFR BLD AUTO: 3.93 10*3/MM3 (ref 1.7–7)
NEUTROPHILS NFR BLD AUTO: 53 % (ref 42.7–76)
NRBC BLD AUTO-RTO: 0 /100 WBC (ref 0–0.2)
PLATELET # BLD AUTO: 232 10*3/MM3 (ref 140–450)
PMV BLD AUTO: 10 FL (ref 6–12)
POTASSIUM SERPL-SCNC: 3.7 MMOL/L (ref 3.5–5.2)
PREALB SERPL-MCNC: 24.1 MG/DL (ref 20–40)
PROT SERPL-MCNC: 6.9 G/DL (ref 6–8.5)
RBC # BLD AUTO: 4.71 10*6/MM3 (ref 3.77–5.28)
SODIUM SERPL-SCNC: 137 MMOL/L (ref 136–145)
WBC NRBC COR # BLD AUTO: 7.4 10*3/MM3 (ref 3.4–10.8)

## 2025-02-19 PROCEDURE — 82306 VITAMIN D 25 HYDROXY: CPT

## 2025-02-19 PROCEDURE — 82150 ASSAY OF AMYLASE: CPT

## 2025-02-19 PROCEDURE — 84134 ASSAY OF PREALBUMIN: CPT

## 2025-02-19 PROCEDURE — 83690 ASSAY OF LIPASE: CPT

## 2025-02-19 PROCEDURE — 82728 ASSAY OF FERRITIN: CPT

## 2025-02-19 PROCEDURE — 36415 COLL VENOUS BLD VENIPUNCTURE: CPT

## 2025-02-19 PROCEDURE — 85025 COMPLETE CBC W/AUTO DIFF WBC: CPT

## 2025-02-19 PROCEDURE — 82746 ASSAY OF FOLIC ACID SERUM: CPT

## 2025-02-19 PROCEDURE — 80053 COMPREHEN METABOLIC PANEL: CPT

## 2025-02-19 PROCEDURE — 84425 ASSAY OF VITAMIN B-1: CPT

## 2025-02-19 PROCEDURE — 83540 ASSAY OF IRON: CPT

## 2025-02-19 PROCEDURE — 83921 ORGANIC ACID SINGLE QUANT: CPT

## 2025-02-19 RX ORDER — ERGOCALCIFEROL 1.25 MG/1
50000 CAPSULE, LIQUID FILLED ORAL
Qty: 12 CAPSULE | Refills: 0 | Status: SHIPPED | OUTPATIENT
Start: 2025-02-19 | End: 2025-05-19

## 2025-02-21 DIAGNOSIS — F90.0 ADHD, PREDOMINANTLY INATTENTIVE TYPE: Chronic | ICD-10-CM

## 2025-02-21 NOTE — TELEPHONE ENCOUNTER
Rx Refill Note  Requested Prescriptions     Pending Prescriptions Disp Refills    amphetamine-dextroamphetamine (Adderall) 10 MG tablet 60 tablet 0     Sig: Take 1 tablet by mouth 2 (Two) Times a Day.      Last office visit with prescribing clinician: 1/15/2025   Last telemedicine visit with prescribing clinician: Visit date not found   Next office visit with prescribing clinician: 5/13/2025   Office Visit with Rae Chatterjee MD (01/15/2025) ToxAssure Flex 22, Ur w/DL - Urine, Random Void (01/15/2025 08:20)                       Would you like a call back once the refill request has been completed: [] Yes [] No    If the office needs to give you a call back, can they leave a voicemail: [] Yes [] No    Gracia Celeste MA  02/21/25, 15:46 EST  UPLOADED

## 2025-02-23 LAB — METHYLMALONATE SERPL-SCNC: 267 NMOL/L (ref 0–378)

## 2025-02-23 RX ORDER — DEXTROAMPHETAMINE SACCHARATE, AMPHETAMINE ASPARTATE, DEXTROAMPHETAMINE SULFATE AND AMPHETAMINE SULFATE 2.5; 2.5; 2.5; 2.5 MG/1; MG/1; MG/1; MG/1
10 TABLET ORAL 2 TIMES DAILY
Qty: 60 TABLET | Refills: 0 | OUTPATIENT
Start: 2025-02-23 | End: 2026-02-23

## 2025-02-24 LAB — VIT B1 BLD-SCNC: 143.4 NMOL/L (ref 66.5–200)

## 2025-02-25 RX ORDER — ONDANSETRON 4 MG/1
4 TABLET, ORALLY DISINTEGRATING ORAL EVERY 8 HOURS PRN
Qty: 30 TABLET | Refills: 0 | OUTPATIENT
Start: 2025-02-25

## 2025-03-10 DIAGNOSIS — F90.0 ADHD, PREDOMINANTLY INATTENTIVE TYPE: Chronic | ICD-10-CM

## 2025-03-10 RX ORDER — DEXTROAMPHETAMINE SACCHARATE, AMPHETAMINE ASPARTATE, DEXTROAMPHETAMINE SULFATE AND AMPHETAMINE SULFATE 2.5; 2.5; 2.5; 2.5 MG/1; MG/1; MG/1; MG/1
10 TABLET ORAL 2 TIMES DAILY
Qty: 60 TABLET | Refills: 0 | Status: CANCELLED | OUTPATIENT
Start: 2025-03-10 | End: 2026-03-10

## 2025-03-10 NOTE — TELEPHONE ENCOUNTER
Rx Refill Note  Requested Prescriptions     Pending Prescriptions Disp Refills    amphetamine-dextroamphetamine (Adderall) 10 MG tablet 60 tablet 0     Sig: Take 1 tablet by mouth 2 (Two) Times a Day.      Last office visit with prescribing clinician: 1/15/2025   Last telemedicine visit with prescribing clinician: Visit date not found   Next office visit with prescribing clinician: 5/13/2025   Office Visit with Rae Chatterjee MD (01/15/2025) ToxAssure Flex 22, Ur w/DL - Urine, Random Void (01/15/2025 08:20)                       Would you like a call back once the refill request has been completed: [] Yes [] No    If the office needs to give you a call back, can they leave a voicemail: [] Yes [] No    Gracia Celeste MA  03/10/25, 16:19 EDT    BEHAVIORAL HEALTH - SCAN - INSPECT MARA 2-21-25 LEONILA (02/21/2025)

## 2025-03-21 RX ORDER — SEMAGLUTIDE 0.5 MG/.5ML
0.5 INJECTION, SOLUTION SUBCUTANEOUS WEEKLY
Qty: 2 ML | Refills: 0 | Status: SHIPPED | OUTPATIENT
Start: 2025-03-21

## 2025-04-08 DIAGNOSIS — F90.0 ADHD, PREDOMINANTLY INATTENTIVE TYPE: Chronic | ICD-10-CM

## 2025-04-09 RX ORDER — DEXTROAMPHETAMINE SACCHARATE, AMPHETAMINE ASPARTATE, DEXTROAMPHETAMINE SULFATE AND AMPHETAMINE SULFATE 2.5; 2.5; 2.5; 2.5 MG/1; MG/1; MG/1; MG/1
10 TABLET ORAL 2 TIMES DAILY
Qty: 60 TABLET | Refills: 0 | Status: SHIPPED | OUTPATIENT
Start: 2025-04-09 | End: 2026-04-09

## 2025-04-11 ENCOUNTER — OFFICE VISIT (OUTPATIENT)
Dept: BARIATRICS/WEIGHT MGMT | Facility: CLINIC | Age: 30
End: 2025-04-11
Payer: COMMERCIAL

## 2025-04-11 VITALS
DIASTOLIC BLOOD PRESSURE: 79 MMHG | SYSTOLIC BLOOD PRESSURE: 118 MMHG | HEART RATE: 83 BPM | BODY MASS INDEX: 35.34 KG/M2 | WEIGHT: 207 LBS | HEIGHT: 64 IN | TEMPERATURE: 98.1 F

## 2025-04-11 DIAGNOSIS — E66.812 OBESITY, CLASS II, BMI 35-39.9: Primary | ICD-10-CM

## 2025-04-11 DIAGNOSIS — Z71.3 DIETARY COUNSELING: ICD-10-CM

## 2025-04-11 NOTE — PROGRESS NOTES
[facility] BAR[facility]ATRIC [facility] BARIATRIC SURGERY  [address]  [facility] [id number] [phone]  [address]  [facility] [address] [phone]  Dept: [phone]  4/11/2025      Anayeli Burdick.  [id number]  [id number]  1995  female      Chief Complaint   Patient presents with    Follow-up     Fup compound       BH Weight Loss Medicine:   Anayeli Burdick presents today for follow up today for provider supervised medical weight loss.    HPI:   Today's weight is 93.9 kg (207 lb) pounds, today's BMI is Body mass index is 35.51 kg/m²., she has a gain of 8 pounds since the last visit.     Anayeli Burdick denies n/v/r/d and reports tolerating medication without issue.  Doesn't feel like medication is helping with cravings.  Thinks that she had to get up to the 1.7 mg dose of Wegovy previously before she started losing weight.  Usually goes to gym after work to burn energy.  Is eating and not vomiting anymore.  Having more cravings.  Some foods that she doesn't even like usually.  Feels like if doesn't satisfy the craving gets emotional even with crying.  When gets bored or very stressed then eats.  Finds that she eats even when she's not hungry.                      Stressful situation at home.  Working a lot and not sleeping many hours or well.  Was drinking water well but now takes to work but never drinks.       Diet and Exercise: Diet history reviewed and discussed with the patient. Weight loss/gains to date discussed with the patient. The patient states they are eating unknown grams of protein per day. She reports eating 3 meals per day, a typical portion size of 1 cup, eating 2 snacks per day, drinking 2 or more 8-oz. glasses of water per day, no carbonated beverage consumption and exercising regularly.         Review of Systems   Constitutional:  Positive for appetite change, fatigue and unexpected weight change.   Respiratory:   Negative for shortness of breath.    Cardiovascular:  Negative for chest pain and palpitations.   Gastrointestinal:  Negative for abdominal pain and vomiting.   All other systems reviewed and are negative.      Patient Active Problem List   Diagnosis    ADHD, predominantly inattentive type    Asthma    Major depressive disorder, recurrent episode, moderate    PTSD (post-traumatic stress disorder)    Chronic back pain    Dietary counseling    Obesity, Class I, BMI 30-34.9    Pain in thoracic spine    Neck pain    Allergic reaction    Abnormal uterine bleeding    Dysmenorrhea    Chronic pelvic pain in female    Migraine without status migrainosus, not intractable    Chronic fatigue    Chronic nausea    Obesity, Class II, BMI 35-39.9       Past Medical History:   Diagnosis Date    ADHD 1995    Anesthesia complication     wakes up aggressive    Anxiety 2013    Asthma 2004    Back pain 2020    Back problem     Claustrophobia     Depression 2017    Headache 2013    PONV (postoperative nausea and vomiting)     Visual impairment 2003       The following portions of the patient's history were reviewed and updated as appropriate: allergies, current medications, past medical history, past surgical history, and problem list.    Vitals:    04/11/25 0921   BP: 118/79   Pulse: 83   Temp: 98.1 °F (36.7 °C)       Physical Exam  Vitals reviewed.   Constitutional:       General: She is not in acute distress.     Appearance: Normal appearance. She is obese.   HENT:      Head: Normocephalic and atraumatic.      Mouth/Throat:      Mouth: Mucous membranes are moist.      Pharynx: Oropharynx is clear.   Eyes:      General: No scleral icterus.     Extraocular Movements: Extraocular movements intact.      Conjunctiva/sclera: Conjunctivae normal.      Pupils: Pupils are equal, round, and reactive to light.   Cardiovascular:      Rate and Rhythm: Normal rate and regular rhythm.   Pulmonary:      Effort: Pulmonary effort is normal. No  respiratory distress.   Abdominal:      General: Bowel sounds are normal.      Palpations: Abdomen is soft.   Musculoskeletal:         General: Normal range of motion.      Cervical back: Normal range of motion and neck supple.   Skin:     General: Skin is warm and dry.   Neurological:      General: No focal deficit present.      Mental Status: She is alert and oriented to person, place, and time.   Psychiatric:         Mood and Affect: Mood normal.         Behavior: Behavior normal.         Thought Content: Thought content normal.         Judgment: Judgment normal.         Assessment:   The patient is struggling with weight, cravings, and emotional eating.  Has been on compounded Semaglutide but not feeling much of a help and would like to explore another option.       Plan:   Diagnoses and all orders for this visit:    1. Obesity, Class II, BMI 35-39.9 (Primary)    2. Dietary counseling    Other orders  -     Tirzepatide-Weight Management (ZEPBOUND) 2.5 MG/0.5ML solution auto-injector; Inject 0.5 mL under the skin into the appropriate area as directed 1 (One) Time Per Week.  Dispense: 2 mL; Refill: 0  -     Tirzepatide-Weight Management (ZEPBOUND) 5 MG/0.5ML solution auto-injector; Inject 0.5 mL under the skin into the appropriate area as directed 1 (One) Time Per Week.  Dispense: 2 mL; Refill: 0      Would like to try Zepbound.  Agreeable to the self pay price.  Discussed would need to sent to [facility].  Discussed should not take Semaglutide and Zepbound.    Discussed would get 1 month/4 week supply.  1 vial is 1 dose.  Needs to take the whole vial each week.    Will let me know once has been on 5 mg 2-3 weeks and then will decide if 5 mg a good dose or need to increase dose.    Long discussion about prioritizing herself and taking better care of herself overall.    Encouraged patient to be sure to get plenty of lean protein per day through small frequent meals all with a protein source.   Activity  restrictions: none.   Recommended patient be sure to get at least 70 grams of protein per day by eating small, frequent meals all with high lean protein choices. Be sure to limit/cut back on daily carbohydrate intake. Discussed with the patient the recommended amount of water per day to intake- half of body weight in ounces. Reviewed vitamin requirements. Be sure to do routine exercise, 150 minutes per week minimum, including both cardio and strength training.     Instructions / Recommendations: dietary counseling recommended, recommended a daily protein intake of  grams, vitamin supplement(s) recommended, recommended exercising at least 150 minutes per week, behavior modifications recommended and instructed to call the office for concerns, questions, or problems.     The patient was instructed to follow up in 3-4 months.     The patient was counseled regarding. Total time spent during this encounter today was 40 minutes.

## 2025-05-03 DIAGNOSIS — F90.0 ADHD, PREDOMINANTLY INATTENTIVE TYPE: Chronic | ICD-10-CM

## 2025-05-05 RX ORDER — TIRZEPATIDE 2.5 MG/.5ML
INJECTION, SOLUTION SUBCUTANEOUS
Qty: 2 ML | Refills: 0 | OUTPATIENT
Start: 2025-05-05

## 2025-05-05 NOTE — TELEPHONE ENCOUNTER
Rx Refill Note  Requested Prescriptions     Pending Prescriptions Disp Refills    amphetamine-dextroamphetamine (Adderall) 10 MG tablet 60 tablet 0     Sig: Take 1 tablet by mouth 2 (Two) Times a Day.      Last office visit with prescribing clinician: 1/15/2025   Last telemedicine visit with prescribing clinician: Visit date not found   Next office visit with prescribing clinician: 5/13/2025 Office Visit with Rae Chatterjee MD (01/15/2025) Encounters:23  Please add Last Relevant Lab Date if appropriate:23}                  Would you like a call back once the refill request has been completed: [] Yes [] No    If the office needs to give you a call back, can they leave a voicemail: [] Yes [] No  Last sold 4/9/25  BEHAVIORAL HEALTH - SCAN - INSPECT LEONILA 5/5/25 (05/05/2025)   Olga Gorman  05/05/25, 16:42 EDT

## 2025-05-06 RX ORDER — DEXTROAMPHETAMINE SACCHARATE, AMPHETAMINE ASPARTATE, DEXTROAMPHETAMINE SULFATE AND AMPHETAMINE SULFATE 2.5; 2.5; 2.5; 2.5 MG/1; MG/1; MG/1; MG/1
10 TABLET ORAL 2 TIMES DAILY
Qty: 60 TABLET | Refills: 0 | Status: SHIPPED | OUTPATIENT
Start: 2025-05-06 | End: 2026-05-06

## 2025-05-13 ENCOUNTER — OFFICE VISIT (OUTPATIENT)
Dept: PSYCHIATRY | Facility: CLINIC | Age: 30
End: 2025-05-13
Payer: COMMERCIAL

## 2025-05-13 DIAGNOSIS — F43.10 PTSD (POST-TRAUMATIC STRESS DISORDER): ICD-10-CM

## 2025-05-13 DIAGNOSIS — F33.1 MAJOR DEPRESSIVE DISORDER, RECURRENT EPISODE, MODERATE: Primary | Chronic | ICD-10-CM

## 2025-05-13 DIAGNOSIS — F90.0 ADHD, PREDOMINANTLY INATTENTIVE TYPE: Chronic | ICD-10-CM

## 2025-05-13 RX ORDER — VORTIOXETINE 20 MG/1
20 TABLET, FILM COATED ORAL
Qty: 90 TABLET | Refills: 1 | Status: SHIPPED | OUTPATIENT
Start: 2025-05-13

## 2025-05-13 RX ORDER — DEXTROAMPHETAMINE SACCHARATE, AMPHETAMINE ASPARTATE, DEXTROAMPHETAMINE SULFATE AND AMPHETAMINE SULFATE 2.5; 2.5; 2.5; 2.5 MG/1; MG/1; MG/1; MG/1
10 TABLET ORAL 2 TIMES DAILY
Qty: 60 TABLET | Refills: 0 | Status: CANCELLED | OUTPATIENT
Start: 2025-05-13 | End: 2026-05-13

## 2025-05-13 NOTE — TELEPHONE ENCOUNTER
Rx Refill Note  Requested Prescriptions     Pending Prescriptions Disp Refills    amphetamine-dextroamphetamine (Adderall) 10 MG tablet 60 tablet 0     Sig: Take 1 tablet by mouth 2 (Two) Times a Day.      Last office visit with prescribing clinician: 5/13/2025     Next office visit with prescribing clinician: 9/11/2025     Office Visit with Rae Chatterjee MD (05/13/2025)     [employer], MS, Ur RFX - (01/15/2025 08:20)  Amphetamines, MS, Ur RFX - (01/15/2025 08:20)  ToxAssure Flex 22, Ur w/DL - Urine, Random Void (01/15/2025 08:20)    BEHAVIORAL HEALTH - SCAN - Inspect, martha, 1995, le (05/13/2025)     [facility], is requesting new script for Adderall to be sent.  Adderall was shipped 7 days ago but never received by the patient.    Med check - 9-    Last Inspect fill: 5-    Maddie Shine MA  05/13/25, 09:21 EDT
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
## 2025-06-02 RX ORDER — TIRZEPATIDE 7.5 MG/.5ML
7.5 INJECTION, SOLUTION SUBCUTANEOUS WEEKLY
Qty: 2 ML | Refills: 1 | Status: SHIPPED | OUTPATIENT
Start: 2025-06-02

## 2025-06-02 RX ORDER — TIRZEPATIDE 5 MG/.5ML
INJECTION, SOLUTION SUBCUTANEOUS
Qty: 2 ML | Refills: 0 | OUTPATIENT
Start: 2025-06-02

## 2025-06-06 ENCOUNTER — APPOINTMENT (OUTPATIENT)
Dept: CT IMAGING | Facility: HOSPITAL | Age: 30
End: 2025-06-06
Payer: COMMERCIAL

## 2025-06-06 ENCOUNTER — HOSPITAL ENCOUNTER (OUTPATIENT)
Facility: HOSPITAL | Age: 30
Setting detail: OBSERVATION
Discharge: HOME | End: 2025-06-07
Attending: EMERGENCY MEDICINE | Admitting: EMERGENCY MEDICINE
Payer: COMMERCIAL

## 2025-06-06 DIAGNOSIS — N39.0 URINARY TRACT INFECTION WITHOUT HEMATURIA, SITE UNSPECIFIED: ICD-10-CM

## 2025-06-06 DIAGNOSIS — R11.15 PERSISTENT VOMITING: Primary | ICD-10-CM

## 2025-06-06 LAB
ALBUMIN SERPL-MCNC: 4.5 G/DL (ref 3.5–5.2)
ALBUMIN/GLOB SERPL: 1.3 G/DL
ALP SERPL-CCNC: 48 U/L (ref 39–117)
ALT SERPL W P-5'-P-CCNC: 14 U/L (ref 1–33)
AMPHET+METHAMPHET UR QL: NEGATIVE
AMPHETAMINES UR QL: NEGATIVE
ANION GAP SERPL CALCULATED.3IONS-SCNC: 11.4 MMOL/L (ref 5–15)
AST SERPL-CCNC: 23 U/L (ref 1–32)
B-HCG UR QL: NEGATIVE
BACTERIA UR QL AUTO: ABNORMAL /HPF
BARBITURATES UR QL SCN: NEGATIVE
BASOPHILS # BLD AUTO: 0.02 10*3/MM3 (ref 0–0.2)
BASOPHILS NFR BLD AUTO: 0.4 % (ref 0–1.5)
BENZODIAZ UR QL SCN: NEGATIVE
BILIRUB SERPL-MCNC: 0.4 MG/DL (ref 0–1.2)
BILIRUB UR QL STRIP: NEGATIVE
BUN SERPL-MCNC: 10 MG/DL (ref 6–20)
BUN/CREAT SERPL: 12 (ref 7–25)
BUPRENORPHINE SERPL-MCNC: NEGATIVE NG/ML
CALCIUM SPEC-SCNC: 9.4 MG/DL (ref 8.6–10.5)
CANNABINOIDS SERPL QL: NEGATIVE
CHLORIDE SERPL-SCNC: 103 MMOL/L (ref 98–107)
CLARITY UR: ABNORMAL
CO2 SERPL-SCNC: 24.6 MMOL/L (ref 22–29)
COCAINE UR QL: NEGATIVE
COLOR UR: YELLOW
CREAT SERPL-MCNC: 0.83 MG/DL (ref 0.57–1)
DEPRECATED RDW RBC AUTO: 38.1 FL (ref 37–54)
EGFRCR SERPLBLD CKD-EPI 2021: 97.4 ML/MIN/1.73
EOSINOPHIL # BLD AUTO: 0.09 10*3/MM3 (ref 0–0.4)
EOSINOPHIL NFR BLD AUTO: 1.7 % (ref 0.3–6.2)
ERYTHROCYTE [DISTWIDTH] IN BLOOD BY AUTOMATED COUNT: 12.5 % (ref 12.3–15.4)
GLOBULIN UR ELPH-MCNC: 3.4 GM/DL
GLUCOSE SERPL-MCNC: 100 MG/DL (ref 65–99)
GLUCOSE UR STRIP-MCNC: NEGATIVE MG/DL
HCT VFR BLD AUTO: 45 % (ref 34–46.6)
HGB BLD-MCNC: 14.8 G/DL (ref 12–15.9)
HGB UR QL STRIP.AUTO: NEGATIVE
HOLD SPECIMEN: NORMAL
HYALINE CASTS UR QL AUTO: ABNORMAL /LPF
IMM GRANULOCYTES # BLD AUTO: 0.01 10*3/MM3 (ref 0–0.05)
IMM GRANULOCYTES NFR BLD AUTO: 0.2 % (ref 0–0.5)
KETONES UR QL STRIP: NEGATIVE
LEUKOCYTE ESTERASE UR QL STRIP.AUTO: ABNORMAL
LIPASE SERPL-CCNC: 39 U/L (ref 13–60)
LYMPHOCYTES # BLD AUTO: 1.66 10*3/MM3 (ref 0.7–3.1)
LYMPHOCYTES NFR BLD AUTO: 31 % (ref 19.6–45.3)
MCH RBC QN AUTO: 27.9 PG (ref 26.6–33)
MCHC RBC AUTO-ENTMCNC: 32.9 G/DL (ref 31.5–35.7)
MCV RBC AUTO: 84.9 FL (ref 79–97)
METHADONE UR QL SCN: NEGATIVE
MONOCYTES # BLD AUTO: 0.48 10*3/MM3 (ref 0.1–0.9)
MONOCYTES NFR BLD AUTO: 9 % (ref 5–12)
NEUTROPHILS NFR BLD AUTO: 3.1 10*3/MM3 (ref 1.7–7)
NEUTROPHILS NFR BLD AUTO: 57.7 % (ref 42.7–76)
NITRITE UR QL STRIP: NEGATIVE
NRBC BLD AUTO-RTO: 0 /100 WBC (ref 0–0.2)
OPIATES UR QL: NEGATIVE
OXYCODONE UR QL SCN: NEGATIVE
PCP UR QL SCN: NEGATIVE
PH UR STRIP.AUTO: 8.5 [PH] (ref 5–8)
PLATELET # BLD AUTO: 214 10*3/MM3 (ref 140–450)
PMV BLD AUTO: 9.7 FL (ref 6–12)
POTASSIUM SERPL-SCNC: 3.5 MMOL/L (ref 3.5–5.2)
PROT SERPL-MCNC: 7.9 G/DL (ref 6–8.5)
PROT UR QL STRIP: ABNORMAL
RBC # BLD AUTO: 5.3 10*6/MM3 (ref 3.77–5.28)
RBC # UR STRIP: ABNORMAL /HPF
REF LAB TEST METHOD: ABNORMAL
SODIUM SERPL-SCNC: 139 MMOL/L (ref 136–145)
SP GR UR STRIP: 1.02 (ref 1–1.03)
SQUAMOUS #/AREA URNS HPF: ABNORMAL /HPF
TRICYCLICS UR QL SCN: NEGATIVE
UROBILINOGEN UR QL STRIP: ABNORMAL
WBC # UR STRIP: ABNORMAL /HPF
WBC NRBC COR # BLD AUTO: 5.36 10*3/MM3 (ref 3.4–10.8)
WHOLE BLOOD HOLD COAG: NORMAL

## 2025-06-06 PROCEDURE — 83690 ASSAY OF LIPASE: CPT | Performed by: EMERGENCY MEDICINE

## 2025-06-06 PROCEDURE — 96365 THER/PROPH/DIAG IV INF INIT: CPT

## 2025-06-06 PROCEDURE — 99285 EMERGENCY DEPT VISIT HI MDM: CPT

## 2025-06-06 PROCEDURE — 25010000002 GENTAMICIN PER 80 MG: Performed by: EMERGENCY MEDICINE

## 2025-06-06 PROCEDURE — 25010000002 ONDANSETRON PER 1 MG: Performed by: EMERGENCY MEDICINE

## 2025-06-06 PROCEDURE — 80053 COMPREHEN METABOLIC PANEL: CPT | Performed by: EMERGENCY MEDICINE

## 2025-06-06 PROCEDURE — 25810000003 SODIUM CHLORIDE 0.9 % SOLUTION: Performed by: NURSE PRACTITIONER

## 2025-06-06 PROCEDURE — 81025 URINE PREGNANCY TEST: CPT | Performed by: NURSE PRACTITIONER

## 2025-06-06 PROCEDURE — 25010000002 METOCLOPRAMIDE PER 10 MG: Performed by: EMERGENCY MEDICINE

## 2025-06-06 PROCEDURE — 25010000002 ONDANSETRON PER 1 MG: Performed by: NURSE PRACTITIONER

## 2025-06-06 PROCEDURE — 25010000002 DIPHENHYDRAMINE PER 50 MG: Performed by: NURSE PRACTITIONER

## 2025-06-06 PROCEDURE — 96375 TX/PRO/DX INJ NEW DRUG ADDON: CPT

## 2025-06-06 PROCEDURE — 81001 URINALYSIS AUTO W/SCOPE: CPT | Performed by: EMERGENCY MEDICINE

## 2025-06-06 PROCEDURE — 80306 DRUG TEST PRSMV INSTRMNT: CPT | Performed by: NURSE PRACTITIONER

## 2025-06-06 PROCEDURE — 25010000002 HYDROMORPHONE PER 4 MG: Performed by: NURSE PRACTITIONER

## 2025-06-06 PROCEDURE — 25810000003 LACTATED RINGERS SOLUTION: Performed by: EMERGENCY MEDICINE

## 2025-06-06 PROCEDURE — 96361 HYDRATE IV INFUSION ADD-ON: CPT

## 2025-06-06 PROCEDURE — 36415 COLL VENOUS BLD VENIPUNCTURE: CPT

## 2025-06-06 PROCEDURE — G0378 HOSPITAL OBSERVATION PER HR: HCPCS

## 2025-06-06 PROCEDURE — 85025 COMPLETE CBC W/AUTO DIFF WBC: CPT | Performed by: EMERGENCY MEDICINE

## 2025-06-06 RX ORDER — ALBUTEROL SULFATE 90 UG/1
2 INHALANT RESPIRATORY (INHALATION) EVERY 4 HOURS PRN
Status: DISCONTINUED | OUTPATIENT
Start: 2025-06-06 | End: 2025-06-07 | Stop reason: HOSPADM

## 2025-06-06 RX ORDER — BISACODYL 5 MG/1
5 TABLET, DELAYED RELEASE ORAL DAILY PRN
Status: DISCONTINUED | OUTPATIENT
Start: 2025-06-06 | End: 2025-06-07 | Stop reason: HOSPADM

## 2025-06-06 RX ORDER — METOCLOPRAMIDE HYDROCHLORIDE 5 MG/ML
10 INJECTION INTRAMUSCULAR; INTRAVENOUS ONCE
Status: COMPLETED | OUTPATIENT
Start: 2025-06-06 | End: 2025-06-06

## 2025-06-06 RX ORDER — BISACODYL 10 MG
10 SUPPOSITORY, RECTAL RECTAL DAILY PRN
Status: DISCONTINUED | OUTPATIENT
Start: 2025-06-06 | End: 2025-06-07 | Stop reason: HOSPADM

## 2025-06-06 RX ORDER — ONDANSETRON 2 MG/ML
8 INJECTION INTRAMUSCULAR; INTRAVENOUS ONCE
Status: COMPLETED | OUTPATIENT
Start: 2025-06-06 | End: 2025-06-06

## 2025-06-06 RX ORDER — NITROGLYCERIN 0.4 MG/1
0.4 TABLET SUBLINGUAL
Status: DISCONTINUED | OUTPATIENT
Start: 2025-06-06 | End: 2025-06-07 | Stop reason: HOSPADM

## 2025-06-06 RX ORDER — ONDANSETRON 4 MG/1
4 TABLET, ORALLY DISINTEGRATING ORAL EVERY 6 HOURS PRN
Status: DISCONTINUED | OUTPATIENT
Start: 2025-06-06 | End: 2025-06-07 | Stop reason: HOSPADM

## 2025-06-06 RX ORDER — SODIUM CHLORIDE 9 MG/ML
40 INJECTION, SOLUTION INTRAVENOUS AS NEEDED
Status: DISCONTINUED | OUTPATIENT
Start: 2025-06-06 | End: 2025-06-07 | Stop reason: HOSPADM

## 2025-06-06 RX ORDER — SCOPOLAMINE 1 MG/3D
1 PATCH, EXTENDED RELEASE TRANSDERMAL
Status: DISCONTINUED | OUTPATIENT
Start: 2025-06-06 | End: 2025-06-07 | Stop reason: HOSPADM

## 2025-06-06 RX ORDER — ERGOCALCIFEROL 1.25 MG/1
50000 CAPSULE, LIQUID FILLED ORAL WEEKLY
COMMUNITY

## 2025-06-06 RX ORDER — SODIUM CHLORIDE 0.9 % (FLUSH) 0.9 %
10 SYRINGE (ML) INJECTION EVERY 12 HOURS SCHEDULED
Status: DISCONTINUED | OUTPATIENT
Start: 2025-06-06 | End: 2025-06-07 | Stop reason: HOSPADM

## 2025-06-06 RX ORDER — SODIUM CHLORIDE 0.9 % (FLUSH) 0.9 %
10 SYRINGE (ML) INJECTION AS NEEDED
Status: DISCONTINUED | OUTPATIENT
Start: 2025-06-06 | End: 2025-06-07 | Stop reason: HOSPADM

## 2025-06-06 RX ORDER — SODIUM CHLORIDE 9 MG/ML
75 INJECTION, SOLUTION INTRAVENOUS CONTINUOUS
Status: DISCONTINUED | OUTPATIENT
Start: 2025-06-06 | End: 2025-06-07 | Stop reason: HOSPADM

## 2025-06-06 RX ORDER — ONDANSETRON 2 MG/ML
4 INJECTION INTRAMUSCULAR; INTRAVENOUS EVERY 6 HOURS PRN
Status: DISCONTINUED | OUTPATIENT
Start: 2025-06-06 | End: 2025-06-07

## 2025-06-06 RX ORDER — LIDOCAINE 4 G/G
2 PATCH TOPICAL
Status: DISCONTINUED | OUTPATIENT
Start: 2025-06-06 | End: 2025-06-07 | Stop reason: HOSPADM

## 2025-06-06 RX ORDER — HYDROMORPHONE HYDROCHLORIDE 1 MG/ML
0.5 INJECTION, SOLUTION INTRAMUSCULAR; INTRAVENOUS; SUBCUTANEOUS EVERY 4 HOURS PRN
Status: DISCONTINUED | OUTPATIENT
Start: 2025-06-06 | End: 2025-06-07

## 2025-06-06 RX ORDER — PREGABALIN 25 MG/1
50 CAPSULE ORAL EVERY 8 HOURS SCHEDULED
Status: DISCONTINUED | OUTPATIENT
Start: 2025-06-06 | End: 2025-06-07 | Stop reason: HOSPADM

## 2025-06-06 RX ORDER — AMOXICILLIN 250 MG
2 CAPSULE ORAL 2 TIMES DAILY PRN
Status: DISCONTINUED | OUTPATIENT
Start: 2025-06-06 | End: 2025-06-07 | Stop reason: HOSPADM

## 2025-06-06 RX ORDER — POLYETHYLENE GLYCOL 3350 17 G/17G
17 POWDER, FOR SOLUTION ORAL DAILY PRN
Status: DISCONTINUED | OUTPATIENT
Start: 2025-06-06 | End: 2025-06-07 | Stop reason: HOSPADM

## 2025-06-06 RX ORDER — DIPHENHYDRAMINE HYDROCHLORIDE 50 MG/ML
25 INJECTION, SOLUTION INTRAMUSCULAR; INTRAVENOUS EVERY 6 HOURS PRN
Status: DISCONTINUED | OUTPATIENT
Start: 2025-06-06 | End: 2025-06-07

## 2025-06-06 RX ADMIN — ONDANSETRON 4 MG: 2 INJECTION, SOLUTION INTRAMUSCULAR; INTRAVENOUS at 17:07

## 2025-06-06 RX ADMIN — ONDANSETRON 4 MG: 2 INJECTION, SOLUTION INTRAMUSCULAR; INTRAVENOUS at 22:28

## 2025-06-06 RX ADMIN — METOCLOPRAMIDE HYDROCHLORIDE 10 MG: 5 INJECTION INTRAMUSCULAR; INTRAVENOUS at 08:07

## 2025-06-06 RX ADMIN — SCOPOLAMINE 1 PATCH: 1.5 PATCH, EXTENDED RELEASE TRANSDERMAL at 14:19

## 2025-06-06 RX ADMIN — HYDROMORPHONE HYDROCHLORIDE 0.5 MG: 1 INJECTION, SOLUTION INTRAMUSCULAR; INTRAVENOUS; SUBCUTANEOUS at 18:33

## 2025-06-06 RX ADMIN — PREGABALIN 50 MG: 25 CAPSULE ORAL at 22:28

## 2025-06-06 RX ADMIN — SODIUM CHLORIDE 75 ML/HR: 900 INJECTION, SOLUTION INTRAVENOUS at 17:13

## 2025-06-06 RX ADMIN — LIDOCAINE 2 PATCH: 4 PATCH TOPICAL at 20:23

## 2025-06-06 RX ADMIN — Medication 10 ML: at 17:07

## 2025-06-06 RX ADMIN — PREGABALIN 50 MG: 25 CAPSULE ORAL at 14:19

## 2025-06-06 RX ADMIN — DIPHENHYDRAMINE HYDROCHLORIDE 25 MG: 50 INJECTION, SOLUTION INTRAMUSCULAR; INTRAVENOUS at 14:20

## 2025-06-06 RX ADMIN — GENTAMICIN SULFATE 490 MG: 40 INJECTION, SOLUTION INTRAMUSCULAR; INTRAVENOUS at 10:54

## 2025-06-06 RX ADMIN — SODIUM CHLORIDE, POTASSIUM CHLORIDE, SODIUM LACTATE AND CALCIUM CHLORIDE 1000 ML: 600; 310; 30; 20 INJECTION, SOLUTION INTRAVENOUS at 08:07

## 2025-06-06 RX ADMIN — ONDANSETRON 8 MG: 2 INJECTION, SOLUTION INTRAMUSCULAR; INTRAVENOUS at 10:54

## 2025-06-06 NOTE — CASE MANAGEMENT/SOCIAL WORK
Discharge Planning Assessment   Jose     Patient Name: Anayeli Burdick  MRN: [id number]  Today's Date: 6/6/2025    Admit Date: 6/6/2025    Plan: From home with family.   Discharge Needs Assessment       Row Name 06/06/25 1107       Living Environment    People in Home spouse;parent(s)    Name(s) of People in Home - Charbel Parents- Esther and Robert    Current Living Arrangements home    Potentially Unsafe Housing Conditions none    In the past 12 months has the electric, gas, oil, or water company threatened to shut off services in your home? No    Primary Care Provided by self    Provides Primary Care For no one    Family Caregiver if Needed spouse    Family Caregiver Names - Charbel    Quality of Family Relationships helpful;involved;supportive    Able to Return to Prior Arrangements yes       Resource/Environmental Concerns    Resource/Environmental Concerns none    Transportation Concerns none       Transportation Needs    In the past 12 months, has lack of transportation kept you from medical appointments or from getting medications? no    In the past 12 months, has lack of transportation kept you from meetings, work, or from getting things needed for daily living? No       Food Insecurity    Within the past 12 months, you worried that your food would run out before you got the money to buy more. Never true    Within the past 12 months, the food you bought just didn't last and you didn't have money to get more. Never true       Transition Planning    Patient/Family Anticipates Transition to home with family    Patient/Family Anticipated Services at Transition none    Transportation Anticipated family or friend will provide       Discharge Needs Assessment    Readmission Within the Last 30 Days no previous admission in last 30 days    Equipment Currently Used at Home none    Concerns to be Addressed discharge planning    Anticipated Changes Related to Illness none    Equipment Needed After  Discharge none                   Discharge Plan       Row Name 06/06/25 1103       Plan    Plan From home with family.    Patient/Family in Agreement with Plan yes    Plan Comments Patient lives at home with her spouse, Charbel, and her parents. Patient does drive and her family will transport at [place]. Patient can do ADL. Patient states that she is not sure who her new PCP is that she is set up to see next month. Pharmacy ([facility]) confirmed. Patient wishes to be enrolled in the [facility] program, CM updated this in the chart. Denies finanical assistance needs with medications and/or food. Denies PT and/or food. Discharge barriers: Pending clinical course.                       Demographic Summary       Row Name 06/06/25 1106       General Information    Admission Type other (see comments)  TBD    Arrived From emergency department    Referral Source admission list    Reason for Consult discharge planning    Preferred Language English       Contact Information    Permission Granted to Share Info With                    Functional Status       Row Name 06/06/25 1104       Functional Status    Usual Activity Tolerance good    Current Activity Tolerance good       Functional Status, IADL    Medications independent    Meal Preparation independent    Housekeeping independent    Laundry independent    Shopping independent               Met with patient in room wearing PPE: mask.    Maintained distance greater than six feet and spent less than 15 minutes in the room.       Mimi Chong RN

## 2025-06-06 NOTE — ED PROVIDER NOTES
Subjective   History of Present Illness  Chief complaint diagnosed recent UTI and now nauseous vomiting and dizzy    History of present illness this is a 30-year-old female who states that she had a hysterectomy back in September she states that she went to the gynecologist this week she had a urine obtained and then was called yesterday and told she had a UTI and started on Cipro.  She states that the office told her if she got worse to go to the emergency room.  She states that she now feels nauseous and has had some vomiting she feels dizzy and just generally weak.  She cannot hold anything down no diarrhea no ill exposures no one at home with similar illness no fever chills denies any trauma she denies any chest pain neck arm jaw pain she denies dysuria frequency or urgency.  No discharge or STD risk.  No trauma no recent long car ride plane ride immobilization leg pain or swelling.  No headaches no speech difficulty no paralysis to one-sided the other.      Review of Systems   Constitutional:  Negative for chills and fever.   Respiratory:  Negative for cough, chest tightness and shortness of breath.    Cardiovascular:  Negative for chest pain and palpitations.   Gastrointestinal:  Positive for abdominal pain and vomiting. Negative for blood in stool.   Genitourinary:  Negative for difficulty urinating, dysuria, hematuria and vaginal discharge.   Musculoskeletal:  Negative for back pain and neck pain.   Skin:  Negative for rash.   Neurological:  Positive for dizziness, weakness and light-headedness. Negative for facial asymmetry, speech difficulty and headaches.   Psychiatric/Behavioral:  Negative for confusion.        Past Medical History:   Diagnosis Date    ADHD 1995    Anesthesia complication     wakes up aggressive    Anxiety 2013    Asthma 2004    Back pain 2020    Back problem     Claustrophobia     Depression 2017    Headache 2013    PONV (postoperative nausea and vomiting)     Visual impairment 2003        Allergies   Allergen Reactions    Adhesive Tape Itching, Rash and Swelling    Aripiprazole Anxiety and Mental Status Change    Aspirin Itching, Swelling and GI Bleeding    Bee Venom Anaphylaxis, Hives, Itching, Swelling and Rash    Ct Contrast Hives    Diclofenac Hives, Itching, Rash and Swelling     Other reaction(s): GI Bleeding    Diflucan [Fluconazole] Anaphylaxis    Ibuprofen Itching, Swelling, Rash and GI Bleeding    Iodine Hives, Itching, Swelling and Rash    Keflex [Cephalexin] Itching, Swelling, Rash and GI Bleeding    Latex Hives, Itching, Swelling and Rash    Levofloxacin Hives, Nausea And Vomiting and Swelling    Oxycodone-Acetaminophen Hives, Itching, Swelling, Rash and GI Bleeding    Paprika Anaphylaxis, GI Intolerance and Hives    Penicillins Itching, Swelling, Rash and GI Bleeding    Pepper Anaphylaxis, GI Intolerance and Hives    Promethazine Hives, Itching, Swelling and Rash    Qelbree [Viloxazine] GI Intolerance    Quetiapine Anxiety, Mental Status Change and Confusion    Shrimp Anaphylaxis, Hives, Itching, Swelling and Rash    Sulfa Antibiotics Hives, Itching, Nausea And Vomiting and Rash    Tramadol Swelling, Rash and GI Bleeding    Piper Other (See Comments)    Gabapentin Palpitations     And dizziness        Past Surgical History:   Procedure Laterality Date    FOOT SURGERY Left 2009    left foot had non cancer mass    FOOT SURGERY Left 2010    left foot had non cancer mass again    NOSE SURGERY Bilateral 08/17/2021    SINUS SURGERY  2021    TONSILLECTOMY AND ADENOIDECTOMY Bilateral 12/2020    TOTAL LAPAROSCOPIC HYSTERECTOMY SALPINGECTOMY Bilateral 9/25/2024    Procedure: TOTAL LAPAROSCOPIC HYSTERECTOMY BILATERAL SALPINGECTOMY WITH DAVINCI ROBOT, NEXPLANON REMOVAL;  Surgeon: Kimberly Moses MD;  Location: [facility] MAIN OR;  Service: Robotics - DaVinci;  Laterality: Bilateral;    WISDOM TOOTH EXTRACTION Bilateral 2017       Family History   Problem Relation Age of Onset    Arthritis Mother      Multiple sclerosis Mother     Thyroid disease Mother     Calcium disorder Mother     Cataracts Mother     Cancer Mother     Thyroid nodules Mother     Stroke Mother     Osteoporotic fracture Mother     Hypertension Father     Heart attack Father     Heart disease Father     Parkinsonism Father     Abnormal EKG Father     Allergies Father     Post-traumatic stress disorder Father     Stroke Father     Thyroid disease Brother     Calcium disorder Brother     Vision loss Brother     Kidney disease Maternal Grandmother     Kidney failure Maternal Grandmother     Cancer Maternal Grandfather     Throat cancer Maternal Grandfather     Stroke Paternal Grandmother     Heart disease Paternal Grandmother     Cancer Paternal Grandfather        Social History     Socioeconomic History    Marital status:     Number of children: 0   Tobacco Use    Smoking status: Never    Smokeless tobacco: Never    Tobacco comments:     Exposed to second hand smoke as a child   Vaping Use    Vaping status: Never Used   Substance and Sexual Activity    Alcohol use: Yes     Comment: only while on vacation    Drug use: Never    Sexual activity: Yes     Partners: Male     Birth control/protection: Implant     Comment: [employer]     Prior to Admission medications    Medication Sig Start Date End Date Taking? Authorizing Provider   acetaminophen (Tylenol 8 Hour) 650 MG 8 hr tablet Take 1 tablet by mouth Every 8 (Eight) Hours As Needed for Mild Pain or Moderate Pain. 9/25/24   Kimberly Moses MD   albuterol (PROVENTIL) (2.5 MG/3ML) 0.083% nebulizer solution Take 1 vial by nebulization Every 4 (Four) Hours As Needed for Wheezing. 1/19/22   Tonya Mraie APRN   albuterol sulfate  (90 Base) MCG/ACT inhaler Inhale 2 puffs Every 4 (Four) Hours As Needed for Wheezing. 1/19/22   Tonya Marie APRN   albuterol sulfate  (90 Base) MCG/ACT inhaler Inhale 2 puffs Every 4 (Four) Hours As Needed for Wheezing. 7/24/24   Thelma Almanzar,  KRISTOPHER   amphetamine-dextroamphetamine (Adderall) 10 MG tablet Take 1 tablet by mouth 2 (Two) Times a Day. 5/6/25 5/6/26  Rae Chatterjee MD   bisacodyl (Fleet Bisacodyl) 10 MG/30ML enema Insert 15 mL into the rectum 2 (Two) Times a Day As Needed for constipation 10/15/24      brompheniramine-pseudoephedrine-DM 30-2-10 MG/5ML syrup Take 10 mL by mouth 4 (Four) Times a Day As Needed for Congestion, Cough or Allergies. 7/24/24   Thelma Almanzar APRN   ciprofloxacin (Cipro) 500 MG tablet Take 1 tablet by mouth Every 12 (Twelve) Hours. 6/2/25      diphenhydrAMINE (BENADRYL) 50 MG capsule Take 1 capsule by mouth 1 hour prior to contrast dye scheduled for contrast 1030 11/22/24 11/15/24      HYDROcodone-acetaminophen (Norco) 5-325 MG per tablet Take 1 tablet by mouth Every 6 (Six) Hours As Needed for Severe Pain. 9/25/24   Kimberly Moses MD   Levomefolate Glucosamine 400 MCG capsule Take 1 capsule by mouth once daily. 8/25/22      lidocaine (XYLOCAINE) 5 % ointment apply 3 (Three) Times a Day As Needed (pain). 2/21/23   Hoda Rich DO   loperamide (IMODIUM A-D) 2 MG tablet Take 1 tablet by mouth. 4/22/24   Ector Martines MD   mupirocin (BACTROBAN) 2 % ointment Apply  topically to the appropriate area as directed 2 (Two) Times a Day. 10/20/23      ondansetron ODT (ZOFRAN-ODT) 4 MG disintegrating tablet Place 1 tablet on the tongue Every 8 (Eight) Hours As Needed for Nausea or Vomiting. 9/25/24   Kimberly Moses MD   pregabalin (LYRICA) 50 MG capsule Take 1 capsule by mouth 3 (Three) Times a Day.  Max Daily Amount: 150 mg 3/10/25      Spiriva Respimat 2.5 MCG/ACT aerosol solution inhaler Inhale 2 puffs Daily. 10/1/21   Tonya Marie APRN   SUMAtriptan (IMITREX) 50 MG tablet Take 1 tablet by mouth. 12/10/20 9/17/24  Provider, MD Ector   Tirzepatide-Weight Management (Zepbound) 7.5 MG/0.5ML solution Inject 0.5 mL under the skin into the appropriate area as directed 1 (One) Time Per Week. 6/2/25    Kell Metz APRN   triamcinolone (KENALOG) 0.1 % paste Apply to ulcers of the mouth/throat 2 (Two) Times a Day as directed. 10/17/23      triamcinolone (KENALOG) 0.1 % paste Apply to aphthous ulcers twice daily. 4/22/24      Vortioxetine HBr (Trintellix) 20 MG tablet Take 1 tablet by mouth Daily With Breakfast. 5/13/25   Rae Chatterjee MD          Objective   Physical Exam  Constitutional is a 30-year-old awake alert she is in no distress resting comfortably on the stretcher triage vital signs have been reviewed.  HEENT extraocular muscles are intact pupils equal round react there is no photophobia mouth is clear neck supple no adenopathy no meningeal signs lungs are clear no retraction no use of accessories no CVA tenderness or spinal tenderness noted heart is regular without murmur rub abdomen soft nontender good bowel sounds no peritoneal findings or pulsatile masses extremities no edema cords or Homans' sign no evidence of DVT skin is warm and dry without rashes neurologic awake alert follows commands orientated x 3 no facial asymmetry speech normal there is no focal weakness.  Procedures           ED Course      Results for orders placed or performed during the hospital encounter of 06/06/25   Comprehensive Metabolic Panel    Collection Time: 06/06/25  7:53 AM    Specimen: Blood   Result Value Ref Range    Glucose 100 (H) 65 - 99 mg/dL    BUN 10.0 6.0 - 20.0 mg/dL    Creatinine 0.83 0.57 - 1.00 mg/dL    Sodium 139 136 - 145 mmol/L    Potassium 3.5 3.5 - 5.2 mmol/L    Chloride 103 98 - 107 mmol/L    CO2 24.6 22.0 - 29.0 mmol/L    Calcium 9.4 8.6 - 10.5 mg/dL    Total Protein 7.9 6.0 - 8.5 g/dL    Albumin 4.5 3.5 - 5.2 g/dL    ALT (SGPT) 14 1 - 33 U/L    AST (SGOT) 23 1 - 32 U/L    Alkaline Phosphatase 48 39 - 117 U/L    Total Bilirubin 0.4 0.0 - 1.2 mg/dL    Globulin 3.4 gm/dL    A/G Ratio 1.3 g/dL    BUN/Creatinine Ratio 12.0 7.0 - 25.0    Anion Gap 11.4 5.0 - 15.0 mmol/L    eGFR 97.4 >60.0 mL/min/1.73    Lipase    Collection Time: 06/06/25  7:53 AM    Specimen: Blood   Result Value Ref Range    Lipase 39 13 - 60 U/L   CBC Auto Differential    Collection Time: 06/06/25  7:53 AM    Specimen: Blood   Result Value Ref Range    WBC 5.36 3.40 - 10.80 10*3/mm3    RBC 5.30 (H) 3.77 - 5.28 10*6/mm3    Hemoglobin 14.8 12.0 - 15.9 g/dL    Hematocrit 45.0 34.0 - 46.6 %    MCV 84.9 79.0 - 97.0 fL    MCH 27.9 26.6 - 33.0 pg    MCHC 32.9 31.5 - 35.7 g/dL    RDW 12.5 12.3 - 15.4 %    RDW-SD 38.1 37.0 - 54.0 fl    MPV 9.7 6.0 - 12.0 fL    Platelets 214 140 - 450 10*3/mm3    Neutrophil % 57.7 42.7 - 76.0 %    Lymphocyte % 31.0 19.6 - 45.3 %    Monocyte % 9.0 5.0 - 12.0 %    Eosinophil % 1.7 0.3 - 6.2 %    Basophil % 0.4 0.0 - 1.5 %    Immature Grans % 0.2 0.0 - 0.5 %    Neutrophils, Absolute 3.10 1.70 - 7.00 10*3/mm3    Lymphocytes, Absolute 1.66 0.70 - 3.10 10*3/mm3    Monocytes, Absolute 0.48 0.10 - 0.90 10*3/mm3    Eosinophils, Absolute 0.09 0.00 - 0.40 10*3/mm3    Basophils, Absolute 0.02 0.00 - 0.20 10*3/mm3    Immature Grans, Absolute 0.01 0.00 - 0.05 10*3/mm3    nRBC 0.0 0.0 - 0.2 /100 WBC   Gold Top - SST    Collection Time: 06/06/25  7:53 AM   Result Value Ref Range    Extra Tube Hold for add-ons.    Light Blue Top    Collection Time: 06/06/25  7:53 AM   Result Value Ref Range    Extra Tube Hold for add-ons.    Urinalysis With Culture If Indicated - Urine, Clean Catch    Collection Time: 06/06/25  7:54 AM    Specimen: Urine, Clean Catch   Result Value Ref Range    Color, UA Yellow Yellow, Straw    Appearance, UA Turbid (A) Clear    pH, UA 8.5 (H) 5.0 - 8.0    Specific Gravity, UA 1.019 1.005 - 1.030    Glucose, UA Negative Negative    Ketones, UA Negative Negative    Bilirubin, UA Negative Negative    Blood, UA Negative Negative    Protein, UA 30 mg/dL (1+) (A) Negative    Leuk Esterase, UA Trace (A) Negative    Nitrite, UA Negative Negative    Urobilinogen, UA 1.0 E.U./dL 0.2 - 1.0 E.U./dL   Urinalysis, Microscopic Only -  Urine, Clean Catch    Collection Time: 06/06/25  7:54 AM    Specimen: Urine, Clean Catch   Result Value Ref Range    RBC, UA 0-2 None Seen, 0-2 /HPF    WBC, UA 3-5 (A) None Seen, 0-2 /HPF    Bacteria, UA None Seen None Seen /HPF    Squamous Epithelial Cells, UA 0-2 None Seen, 0-2 /HPF    Hyaline Casts, UA None Seen None Seen /LPF    Methodology Automated Microscopy    Pregnancy, Urine - Urine, Clean Catch    Collection Time: 06/06/25  7:54 AM    Specimen: Urine, Clean Catch   Result Value Ref Range    HCG, Urine QL Negative Negative   Urine Drug Screen - Urine, Clean Catch    Collection Time: 06/06/25  7:54 AM    Specimen: Urine, Clean Catch   Result Value Ref Range    THC, Screen, Urine Negative Negative    Phencyclidine (PCP), Urine Negative Negative    Cocaine Screen, Urine Negative Negative    Methamphetamine, Ur Negative Negative    Opiate Screen Negative Negative    Amphetamine Screen, Urine Negative Negative    Benzodiazepine Screen, Urine Negative Negative    Tricyclic Antidepressants Screen Negative Negative    Methadone Screen, Urine Negative Negative    Barbiturates Screen, Urine Negative Negative    Oxycodone Screen, Urine Negative Negative    Buprenorphine, Screen, Urine Negative Negative     No radiology results for the last day  Medications   sodium chloride 0.9 % flush 10 mL (has no administration in time range)   albuterol sulfate HFA (PROVENTIL HFA;VENTOLIN HFA;PROAIR HFA) inhaler 2 puff (has no administration in time range)   pregabalin (LYRICA) capsule 50 mg (50 mg Oral Given 6/6/25 1419)   Vortioxetine HBr (TRINTELLIX) tablet 20 mg (has no administration in time range)   sodium chloride 0.9 % flush 10 mL (10 mL Intravenous Not Given 6/6/25 1526)   sodium chloride 0.9 % flush 10 mL (has no administration in time range)   sodium chloride 0.9 % infusion 40 mL (has no administration in time range)   nitroglycerin (NITROSTAT) SL tablet 0.4 mg (has no administration in time range)    sennosides-docusate (PERICOLACE) 8.6-50 MG per tablet 2 tablet (has no administration in time range)     And   polyethylene glycol (MIRALAX) packet 17 g (has no administration in time range)     And   bisacodyl (DULCOLAX) EC tablet 5 mg (has no administration in time range)     And   bisacodyl (DULCOLAX) suppository 10 mg (has no administration in time range)   ondansetron ODT (ZOFRAN-ODT) disintegrating tablet 4 mg (has no administration in time range)     Or   ondansetron (ZOFRAN) injection 4 mg (has no administration in time range)   scopolamine patch 1 mg/72 hr ( Transdermal Canceled Entry 6/6/25 1500)   diphenhydrAMINE (BENADRYL) injection 25 mg (25 mg Intravenous Given 6/6/25 1420)   Lidocaine 4 % 2 patch (has no administration in time range)   HYDROmorphone (DILAUDID) injection 0.5 mg (has no administration in time range)   sodium chloride 0.9 % infusion (has no administration in time range)   lactated ringers bolus 1,000 mL (0 mL Intravenous Stopped 6/6/25 1134)   metoclopramide (REGLAN) injection 10 mg (10 mg Intravenous Given 6/6/25 0807)   gentamicin (GARAMYCIN) 490 mg in sodium chloride 0.9 % IVPB (0 mg Intravenous Stopped 6/6/25 1134)   ondansetron (ZOFRAN) injection 8 mg (8 mg Intravenous Given 6/6/25 1054)                                            Records show that she just had a urine culture from the office which showed Klebsiella with multiple sensitivities.            Medical Decision Making  Medical decision making.  Patient IV established monitor placed by review of sinus rhythm and 1 L lactated Ringer's Reglan 10 mg IV.  Record review from the gynecologist office urine culture was positive for Klebsiella with multiple sensitivities including penicillins and cephalosporins.  Labs obtained by independent review comprehensive metabolic profile was unremarkable liver lipase CBC normal.  Today urine was unremarkable other than 3-5 white cells.  The patient still complaining of some nausea on  repeat exam abdomen soft without tenderness good bowel sounds no peritoneal findings she was given Zofran 8 mg IV.  Her urine culture was sensitive to gentamicin I gave her a dose of gentamicin.  Patient has been up and amatory she walks to the bathroom and back.  I do not see any evidence suggest sepsis do not see evidence of any type of cardiac issue DVT pulmonary embolism peritonitis appendicitis cholecystitis pancreatitis ischemic bowel bowel obstruction dissection based on my history and physical clinical findings although not a complete list of all possibilities.  We talked about outpatient versus inpatient but she still feels generally not right.  She be placed in observation for further care stable otherwise unremarkable ER course antibiotics have been discussed with pharmacy as well.  Provider notified case discussed.    Problems Addressed:  Persistent vomiting: complicated acute illness or injury  Urinary tract infection without hematuria, site unspecified: complicated acute illness or injury    Amount and/or Complexity of Data Reviewed  External Data Reviewed: labs.  Labs: ordered. Decision-making details documented in ED Course.    Risk  Prescription drug management.  Decision regarding hospitalization.        Final diagnoses:   Persistent vomiting   Urinary tract infection without hematuria, site unspecified       ED Disposition  ED Disposition       ED Disposition   Decision to Admit    Condition   --    Comment   --               No follow-up provider specified.       Medication List        ASK your doctor about these medications      Ventolin  (90 Base) MCG/ACT inhaler  Generic drug: albuterol sulfate HFA  Inhale 2 puffs Every 4 (Four) Hours As Needed for Wheezing.  Ask about: Which instructions should I use?                 Hayden Orantes MD  06/06/25 1640

## 2025-06-07 ENCOUNTER — APPOINTMENT (OUTPATIENT)
Dept: CT IMAGING | Facility: HOSPITAL | Age: 30
End: 2025-06-07
Payer: COMMERCIAL

## 2025-06-07 VITALS
OXYGEN SATURATION: 99 % | WEIGHT: 207.67 LBS | SYSTOLIC BLOOD PRESSURE: 100 MMHG | DIASTOLIC BLOOD PRESSURE: 62 MMHG | RESPIRATION RATE: 14 BRPM | BODY MASS INDEX: 35.45 KG/M2 | HEART RATE: 88 BPM | TEMPERATURE: 97.3 F | HEIGHT: 64 IN

## 2025-06-07 LAB
ANION GAP SERPL CALCULATED.3IONS-SCNC: 8.5 MMOL/L (ref 5–15)
BASOPHILS # BLD AUTO: 0.02 10*3/MM3 (ref 0–0.2)
BASOPHILS NFR BLD AUTO: 0.4 % (ref 0–1.5)
BUN SERPL-MCNC: 10.5 MG/DL (ref 6–20)
BUN/CREAT SERPL: 13.8 (ref 7–25)
CALCIUM SPEC-SCNC: 8.4 MG/DL (ref 8.6–10.5)
CHLORIDE SERPL-SCNC: 105 MMOL/L (ref 98–107)
CO2 SERPL-SCNC: 22.5 MMOL/L (ref 22–29)
CREAT SERPL-MCNC: 0.76 MG/DL (ref 0.57–1)
DEPRECATED RDW RBC AUTO: 41 FL (ref 37–54)
EGFRCR SERPLBLD CKD-EPI 2021: 108.3 ML/MIN/1.73
EOSINOPHIL # BLD AUTO: 0.19 10*3/MM3 (ref 0–0.4)
EOSINOPHIL NFR BLD AUTO: 3.9 % (ref 0.3–6.2)
ERYTHROCYTE [DISTWIDTH] IN BLOOD BY AUTOMATED COUNT: 12.6 % (ref 12.3–15.4)
GLUCOSE SERPL-MCNC: 85 MG/DL (ref 65–99)
HCT VFR BLD AUTO: 42.2 % (ref 34–46.6)
HGB BLD-MCNC: 13.7 G/DL (ref 12–15.9)
IMM GRANULOCYTES # BLD AUTO: 0.01 10*3/MM3 (ref 0–0.05)
IMM GRANULOCYTES NFR BLD AUTO: 0.2 % (ref 0–0.5)
LYMPHOCYTES # BLD AUTO: 2.25 10*3/MM3 (ref 0.7–3.1)
LYMPHOCYTES NFR BLD AUTO: 46.5 % (ref 19.6–45.3)
MCH RBC QN AUTO: 28.7 PG (ref 26.6–33)
MCHC RBC AUTO-ENTMCNC: 32.5 G/DL (ref 31.5–35.7)
MCV RBC AUTO: 88.3 FL (ref 79–97)
MONOCYTES # BLD AUTO: 0.59 10*3/MM3 (ref 0.1–0.9)
MONOCYTES NFR BLD AUTO: 12.2 % (ref 5–12)
NEUTROPHILS NFR BLD AUTO: 1.78 10*3/MM3 (ref 1.7–7)
NEUTROPHILS NFR BLD AUTO: 36.8 % (ref 42.7–76)
NRBC BLD AUTO-RTO: 0 /100 WBC (ref 0–0.2)
PLATELET # BLD AUTO: 186 10*3/MM3 (ref 140–450)
PMV BLD AUTO: 9.8 FL (ref 6–12)
POTASSIUM SERPL-SCNC: 3.7 MMOL/L (ref 3.5–5.2)
RBC # BLD AUTO: 4.78 10*6/MM3 (ref 3.77–5.28)
SODIUM SERPL-SCNC: 136 MMOL/L (ref 136–145)
WBC NRBC COR # BLD AUTO: 4.84 10*3/MM3 (ref 3.4–10.8)

## 2025-06-07 PROCEDURE — 96361 HYDRATE IV INFUSION ADD-ON: CPT

## 2025-06-07 PROCEDURE — 80048 BASIC METABOLIC PNL TOTAL CA: CPT | Performed by: NURSE PRACTITIONER

## 2025-06-07 PROCEDURE — G0378 HOSPITAL OBSERVATION PER HR: HCPCS

## 2025-06-07 PROCEDURE — 85025 COMPLETE CBC W/AUTO DIFF WBC: CPT | Performed by: NURSE PRACTITIONER

## 2025-06-07 PROCEDURE — 74176 CT ABD & PELVIS W/O CONTRAST: CPT

## 2025-06-07 PROCEDURE — 96376 TX/PRO/DX INJ SAME DRUG ADON: CPT

## 2025-06-07 PROCEDURE — 25010000002 HYDROMORPHONE PER 4 MG: Performed by: NURSE PRACTITIONER

## 2025-06-07 PROCEDURE — 25810000003 SODIUM CHLORIDE 0.9 % SOLUTION: Performed by: NURSE PRACTITIONER

## 2025-06-07 RX ORDER — SCOPOLAMINE 1 MG/3D
1 PATCH, EXTENDED RELEASE TRANSDERMAL
Qty: 4 EACH | Refills: 0 | Status: SHIPPED | OUTPATIENT
Start: 2025-06-09

## 2025-06-07 RX ORDER — DIPHENHYDRAMINE HCL 25 MG
25 CAPSULE ORAL EVERY 6 HOURS PRN
Status: DISCONTINUED | OUTPATIENT
Start: 2025-06-07 | End: 2025-06-07 | Stop reason: HOSPADM

## 2025-06-07 RX ORDER — CYCLOBENZAPRINE HCL 10 MG
10 TABLET ORAL 3 TIMES DAILY PRN
Status: DISCONTINUED | OUTPATIENT
Start: 2025-06-07 | End: 2025-06-07 | Stop reason: HOSPADM

## 2025-06-07 RX ORDER — CYCLOBENZAPRINE HCL 10 MG
10 TABLET ORAL 3 TIMES DAILY PRN
Qty: 10 TABLET | Refills: 0 | Status: SHIPPED | OUTPATIENT
Start: 2025-06-07

## 2025-06-07 RX ORDER — LIDOCAINE 4 G/G
2 PATCH TOPICAL
Qty: 4 EACH | Refills: 0 | Status: SHIPPED | OUTPATIENT
Start: 2025-06-08

## 2025-06-07 RX ORDER — ONDANSETRON 4 MG/1
4 TABLET, ORALLY DISINTEGRATING ORAL EVERY 8 HOURS PRN
Qty: 15 TABLET | Refills: 0 | Status: SHIPPED | OUTPATIENT
Start: 2025-06-07

## 2025-06-07 RX ADMIN — HYDROMORPHONE HYDROCHLORIDE 0.5 MG: 1 INJECTION, SOLUTION INTRAMUSCULAR; INTRAVENOUS; SUBCUTANEOUS at 00:59

## 2025-06-07 RX ADMIN — LIDOCAINE 2 PATCH: 4 PATCH TOPICAL at 08:17

## 2025-06-07 RX ADMIN — Medication 10 ML: at 00:59

## 2025-06-07 RX ADMIN — VORTIOXETINE 20 MG: 20 TABLET, FILM COATED ORAL at 08:18

## 2025-06-07 RX ADMIN — PREGABALIN 50 MG: 25 CAPSULE ORAL at 13:21

## 2025-06-07 RX ADMIN — PREGABALIN 50 MG: 25 CAPSULE ORAL at 05:23

## 2025-06-07 RX ADMIN — SODIUM CHLORIDE 75 ML/HR: 900 INJECTION, SOLUTION INTRAVENOUS at 05:19

## 2025-06-07 RX ADMIN — Medication 10 ML: at 08:17

## 2025-06-07 NOTE — PLAN OF CARE
Problem: Adult Inpatient Plan of Care  Goal: Optimal Comfort and Wellbeing  Intervention: Monitor Pain and Promote Comfort  Recent Flowsheet Documentation  Taken 6/6/2025 1903 by Milena Anderson RN  Pain Management Interventions: pain medication given  Intervention: Provide Person-Centered Care  Recent Flowsheet Documentation  Taken 6/6/2025 2000 by Milena Anderson RN  Trust Relationship/Rapport:   care explained   choices provided   empathic listening provided   questions answered   questions encouraged   reassurance provided   thoughts/feelings acknowledged     Problem: Pain Acute  Goal: Optimal Pain Control and Function  Intervention: Optimize Psychosocial Wellbeing  Recent Flowsheet Documentation  Taken 6/6/2025 2000 by Milena Anderson RN  Supportive Measures: self-care encouraged  Diversional Activities: television  Intervention: Develop Pain Management Plan  Recent Flowsheet Documentation  Taken 6/6/2025 1903 by Milena Anderson RN  Pain Management Interventions: pain medication given  Intervention: Prevent or Manage Pain  Recent Flowsheet Documentation  Taken 6/6/2025 2000 by Milena Anderson RN  Sensory Stimulation Regulation: care clustered  Sleep/Rest Enhancement:   relaxation techniques promoted   room darkened  Medication Review/Management: medications reviewed   Goal Outcome Evaluation:           Progress: no change  Outcome Evaluation: Pt is here fo nausea/vomiting. Pt continues to have nausea and no vomiting, managed with PRN medicine as ordered. Pain on lower back managed with PRN medicine and lidocaine patch applied. Pt did CT Abdomen and Pelvis w/o contrast. IV fluids maintained. For continuity of care.

## 2025-06-07 NOTE — PLAN OF CARE
Problem: Adult Inpatient Plan of Care  Goal: Plan of Care Review  Outcome: Progressing  Goal: Absence of Hospital-Acquired Illness or Injury  Intervention: Identify and Manage Fall Risk  Recent Flowsheet Documentation  Taken 6/7/2025 0800 by Erum Banda RN  Safety Promotion/Fall Prevention:   activity supervised   assistive device/personal items within reach   clutter free environment maintained   fall prevention program maintained   lighting adjusted   nonskid shoes/slippers when out of bed   room organization consistent   safety round/check completed  Intervention: Prevent Skin Injury  Recent Flowsheet Documentation  Taken 6/7/2025 0800 by Erum Banda RN  Body Position: position changed independently  Skin Protection: transparent dressing maintained  Intervention: Prevent and Manage VTE (Venous Thromboembolism) Risk  Recent Flowsheet Documentation  Taken 6/7/2025 0800 by Erum Banda RN  VTE Prevention/Management:   bilateral   SCDs (sequential compression devices) off  Intervention: Prevent Infection  Recent Flowsheet Documentation  Taken 6/7/2025 0800 by Erum Banda RN  Infection Prevention:   hand hygiene promoted   rest/sleep promoted   single patient room provided  Goal: Optimal Comfort and Wellbeing  Intervention: Provide Person-Centered Care  Recent Flowsheet Documentation  Taken 6/7/2025 0800 by Erum Banda RN  Trust Relationship/Rapport:   care explained   choices provided   empathic listening provided   questions answered     Problem: Pain Acute  Goal: Optimal Pain Control and Function  Intervention: Optimize Psychosocial Wellbeing  Recent Flowsheet Documentation  Taken 6/7/2025 0800 by Erum Banda RN  Supportive Measures: active listening utilized  Diversional Activities: smartphone  Intervention: Prevent or Manage Pain  Recent Flowsheet Documentation  Taken 6/7/2025 0800 by Erum Banda RN  Sensory Stimulation Regulation: care clustered  Bowel Elimination Promotion: adequate fluid intake  promoted  Sleep/Rest Enhancement: family presence promoted  Medication Review/Management: medications reviewed     Problem: UTI (Urinary Tract Infection)  Goal: Improved Infection Symptoms  Outcome: Progressing   Goal Outcome Evaluation:   Pt AO x 4. RA, VSS. Complained of juanita lower back pain, Lidocaine patch applied as ordered, IVF maintained. Call light within reach, family at bedside. Continuity of care provided.

## 2025-06-07 NOTE — DISCHARGE SUMMARY
[facility]    No primary care provider on file.    CHIEF COMPLAINT:     Nausea    HISTORY OF PRESENT ILLNESS:    Nausea  Symptoms include nausea and vomiting.    Pertinent negative symptoms include no abdominal pain and no fever.       History of Present Illness  ED 06/06/2025  History of present illness this is a 30-year-old female who states that she had a hysterectomy back in September she states that she went to the gynecologist this week she had a urine obtained and then was called yesterday and told she had a UTI and started on Cipro.  She states that the office told her if she got worse to go to the emergency room.  She states that she now feels nauseous and has had some vomiting she feels dizzy and just generally weak.  She cannot hold anything down no diarrhea no ill exposures no one at home with similar illness no fever chills denies any trauma she denies any chest pain neck arm jaw pain she denies dysuria frequency or urgency.  No discharge or STD risk.  No trauma no recent long car ride plane ride immobilization leg pain or swelling.  No headaches no speech difficulty no paralysis to one-sided the other.     Observation 06/06/2025  Patient agrees with HPI noted above including n/v/d since starting cipro for UTI. She is feeling better today and tolerating po intake. Still having some nausea but no vomiting or diarrhea. She received a dose of iv gentamicin and per pharmacist does not need any additional doses or po antibiotic at [place]. She states lidocaine patch helps with back pain. She denies any previous injury. Discussed with patient that ct a/p negative for pyelonephritis, back pain likely related to cystitis or musculoskeletal.      Reassessed this afternoon and feeling better. Just had lunch and tolerated well. Eager for [place].     Past Medical History:   Diagnosis Date    ADHD 1995    Anesthesia complication     wakes up aggressive    Anxiety 2013    Asthma 2004    Back pain 2020     Back problem     Claustrophobia     Depression 2017    Headache 2013    PONV (postoperative nausea and vomiting)     Visual impairment 2003     Past Surgical History:   Procedure Laterality Date    FOOT SURGERY Left 2009    left foot had non cancer mass    FOOT SURGERY Left 2010    left foot had non cancer mass again    NOSE SURGERY Bilateral 08/17/2021    SINUS SURGERY  2021    TONSILLECTOMY AND ADENOIDECTOMY Bilateral 12/2020    TOTAL LAPAROSCOPIC HYSTERECTOMY SALPINGECTOMY Bilateral 9/25/2024    Procedure: TOTAL LAPAROSCOPIC HYSTERECTOMY BILATERAL SALPINGECTOMY WITH DAVINCI ROBOT, NEXPLANON REMOVAL;  Surgeon: Kimberly Moses MD;  Location: [facility] MAIN OR;  Service: Robotics - DaVinci;  Laterality: Bilateral;    WISDOM TOOTH EXTRACTION Bilateral 2017     Family History   Problem Relation Age of Onset    Arthritis Mother     Multiple sclerosis Mother     Thyroid disease Mother     Calcium disorder Mother     Cataracts Mother     Cancer Mother     Thyroid nodules Mother     Stroke Mother     Osteoporotic fracture Mother     Hypertension Father     Heart attack Father     Heart disease Father     Parkinsonism Father     Abnormal EKG Father     Allergies Father     Post-traumatic stress disorder Father     Stroke Father     Thyroid disease Brother     Calcium disorder Brother     Vision loss Brother     Kidney disease Maternal Grandmother     Kidney failure Maternal Grandmother     Cancer Maternal Grandfather     Throat cancer Maternal Grandfather     Stroke Paternal Grandmother     Heart disease Paternal Grandmother     Cancer Paternal Grandfather      Social History     Tobacco Use    Smoking status: Never    Smokeless tobacco: Never    Tobacco comments:     Exposed to second hand smoke as a child   Vaping Use    Vaping status: Never Used   Substance Use Topics    Alcohol use: Yes     Comment: only while on vacation    Drug use: Never     Medications Prior to Admission   Medication Sig Dispense Refill Last  Dose/Taking    amphetamine-dextroamphetamine (Adderall) 10 MG tablet Take 1 tablet by mouth 2 (Two) Times a Day. 60 tablet 0 Taking    ciprofloxacin (Cipro) 500 MG tablet Take 1 tablet by mouth Every 12 (Twelve) Hours. 14 tablet 0 6/5/2025 Evening    pregabalin (LYRICA) 50 MG capsule Take 1 capsule by mouth 3 (Three) Times a Day.  Max Daily Amount: 150 mg 90 capsule 5 Taking    Tirzepatide-Weight Management (Zepbound) 7.5 MG/0.5ML solution Inject 0.5 mL under the skin into the appropriate area as directed 1 (One) Time Per Week. 2 mL 1 6/5/2025    vitamin D (ERGOCALCIFEROL) 1.25 MG (98473 UT) capsule capsule Take 1 capsule by mouth 1 (One) Time Per Week. On Fridays 5/30/2025    Vortioxetine HBr (Trintellix) 20 MG tablet Take 1 tablet by mouth Daily With Breakfast. 90 tablet 1 Taking    [DISCONTINUED] ondansetron ODT (ZOFRAN-ODT) 4 MG disintegrating tablet Place 1 tablet on the tongue Every 8 (Eight) Hours As Needed for Nausea or Vomiting. 30 tablet 0 Taking As Needed    albuterol sulfate  (90 Base) MCG/ACT inhaler Inhale 2 puffs Every 4 (Four) Hours As Needed for Wheezing. 18 g 0      Allergies:  Adhesive tape, Aripiprazole, Aspirin, Bee venom, Ct contrast, Diclofenac, Diflucan [fluconazole], Ibuprofen, Iodine, Keflex [cephalexin], Latex, Levofloxacin, Oxycodone-acetaminophen, Paprika, Penicillins, Pepper, Promethazine, Qelbree [viloxazine], Quetiapine, Shrimp, Sulfa antibiotics, Tramadol, Piper, and Gabapentin    Immunization History   Administered Date(s) Administered    COVID-19 (PFIZER) Purple Cap Monovalent 04/02/2021, 05/15/2021    Flu Vaccine Split Quad 10/15/2018    Fluzone  >6mos 10/15/2024    Fluzone (or Fluarix & Flulaval for VFC) >6mos 10/15/2018    Hepatitis A 05/24/2018    Hpv9 09/11/2017, 11/13/2017, 03/12/2018    Influenza, Unspecified 10/12/2024           REVIEW OF SYSTEMS:    Review of Systems   Constitutional: Negative for fever and malaise/fatigue.   Musculoskeletal:  Positive for back  pain.   Gastrointestinal:  Positive for diarrhea, nausea and vomiting. Negative for abdominal pain.   All other systems reviewed and are negative.    Symptoms improved     Vital Signs  Temp:  [97.3 °F (36.3 °C)-98.1 °F (36.7 °C)] 97.3 °F (36.3 °C)  Heart Rate:  [61-88] 88  Resp:  [14-20] 14  BP: ()/(49-74) 100/62          Physical Exam:  Physical Exam  Vitals and nursing note reviewed.   Constitutional:       Appearance: Normal appearance. She is obese.   HENT:      Head: Normocephalic and atraumatic.      Right Ear: External ear normal.      Left Ear: External ear normal.      Nose: Nose normal.      Mouth/Throat:      Mouth: Mucous membranes are moist.      Pharynx: Oropharynx is clear.   Eyes:      Extraocular Movements: Extraocular movements intact.   Cardiovascular:      Rate and Rhythm: Normal rate and regular rhythm.      Pulses: Normal pulses.      Heart sounds: Normal heart sounds.   Pulmonary:      Effort: Pulmonary effort is normal.      Breath sounds: Normal breath sounds.   Abdominal:      General: Abdomen is flat. Bowel sounds are normal.      Palpations: Abdomen is soft.   Musculoskeletal:         General: Normal range of motion.      Cervical back: Normal range of motion.   Skin:     General: Skin is warm.   Neurological:      General: No focal deficit present.      Mental Status: She is alert and oriented to person, place, and time.   Psychiatric:         Mood and Affect: Mood normal.         Behavior: Behavior normal.           Emotional Behavior:    Normal    Debilities:   None  Results Review:    I reviewed the patient's new clinical results.  Lab Results (most recent)       Procedure Component Value Units Date/Time    Basic Metabolic Panel [[id number]]  (Abnormal) Collected: 06/07/25 0348    Specimen: Blood from Arm, Right Updated: 06/07/25 0986     Glucose 85 mg/dL      BUN 10.5 mg/dL      Creatinine 0.76 mg/dL      Sodium 136 mmol/L      Potassium 3.7 mmol/L      Chloride 105 mmol/L       CO2 22.5 mmol/L      Calcium 8.4 mg/dL      BUN/Creatinine Ratio 13.8     Anion Gap 8.5 mmol/L      eGFR 108.3 mL/min/1.73     Narrative:      GFR Categories in Chronic Kidney Disease (CKD)              GFR Category          GFR (mL/min/1.73)    Interpretation  G1                    90 or greater        Normal or high (1)  G2                    60-89                Mild decrease (1)  G3a                   45-59                Mild to moderate decrease  G3b                   30-44                Moderate to severe decrease  G4                    15-29                Severe decrease  G5                    14 or less           Kidney failure    (1)In the absence of evidence of kidney disease, neither GFR category G1 or G2 fulfill the criteria for CKD.    eGFR calculation 2021 CKD-EPI creatinine equation, which does not include race as a factor    CBC & Differential [[id number]]  (Abnormal) Collected: 06/07/25 0348    Specimen: Blood from Arm, Right Updated: 06/07/25 0419    Narrative:      The following orders were created for panel order CBC & Differential.  Procedure                               Abnormality         Status                     ---------                               -----------         ------                     CBC Auto Differential[[id number]]        Abnormal            Final result                 Please view results for these tests on the individual orders.    CBC Auto Differential [[id number]]  (Abnormal) Collected: 06/07/25 0348    Specimen: Blood from Arm, Right Updated: 06/07/25 0419     WBC 4.84 10*3/mm3      RBC 4.78 10*6/mm3      Hemoglobin 13.7 g/dL      Hematocrit 42.2 %      MCV 88.3 fL      MCH 28.7 pg      MCHC 32.5 g/dL      RDW 12.6 %      RDW-SD 41.0 fl      MPV 9.8 fL      Platelets 186 10*3/mm3      Neutrophil % 36.8 %      Lymphocyte % 46.5 %      Monocyte % 12.2 %      Eosinophil % 3.9 %      Basophil % 0.4 %      Immature Grans % 0.2 %      Neutrophils, Absolute 1.78 10*3/mm3       Lymphocytes, Absolute 2.25 10*3/mm3      Monocytes, Absolute 0.59 10*3/mm3      Eosinophils, Absolute 0.19 10*3/mm3      Basophils, Absolute 0.02 10*3/mm3      Immature Grans, Absolute 0.01 10*3/mm3      nRBC 0.0 /100 WBC     Urine Drug Screen - Urine, Clean Catch [[id number]]  (Normal) Collected: 06/06/25 0754    Specimen: Urine, Clean Catch Updated: 06/06/25 1624     THC, Screen, Urine Negative     Phencyclidine (PCP), Urine Negative     Cocaine Screen, Urine Negative     Methamphetamine, Ur Negative     Opiate Screen Negative     Amphetamine Screen, Urine Negative     Benzodiazepine Screen, Urine Negative     Tricyclic Antidepressants Screen Negative     Methadone Screen, Urine Negative     Barbiturates Screen, Urine Negative     Oxycodone Screen, Urine Negative     Buprenorphine, Screen, Urine Negative    Narrative:      Cutoff For Drugs Screened:    Amphetamines               500 ng/ml  Barbiturates               200 ng/ml  Benzodiazepines            150 ng/ml  Cocaine                    150 ng/ml  Methadone                  200 ng/ml  Opiates                    100 ng/ml  Phencyclidine               25 ng/ml  THC                         50 ng/ml  Methamphetamine            500 ng/ml  Tricyclic Antidepressants  300 ng/ml  Oxycodone                  100 ng/ml  Buprenorphine               10 ng/ml    The normal value for all drugs tested is negative. This report includes unconfirmed screening results, with the cutoff values listed, to be used for medical treatment purposes only.  Unconfirmed results must not be used for non-medical purposes such as employment or legal testing.  Clinical consideration should be applied to any drug of abuse test, particularly when unconfirmed results are used.    All urine drugs of abuse requests without chain of custody are for medical screening purposes only.  False positives are possible.      Pregnancy, Urine - Urine, Clean Catch [[id number]]  (Normal) Collected: 06/06/25 0752     Specimen: Urine, Clean Catch Updated: 06/06/25 1616     HCG, Urine QL Negative    Comprehensive Metabolic Panel [[id number]]  (Abnormal) Collected: 06/06/25 0753    Specimen: Blood Updated: 06/06/25 0833     Glucose 100 mg/dL      BUN 10.0 mg/dL      Creatinine 0.83 mg/dL      Sodium 139 mmol/L      Potassium 3.5 mmol/L      Comment: Specimen hemolyzed.  Result may be falsely elevated.        Chloride 103 mmol/L      CO2 24.6 mmol/L      Calcium 9.4 mg/dL      Total Protein 7.9 g/dL      Albumin 4.5 g/dL      ALT (SGPT) 14 U/L      AST (SGOT) 23 U/L      Alkaline Phosphatase 48 U/L      Total Bilirubin 0.4 mg/dL      Globulin 3.4 gm/dL      A/G Ratio 1.3 g/dL      BUN/Creatinine Ratio 12.0     Anion Gap 11.4 mmol/L      eGFR 97.4 mL/min/1.73     Narrative:      GFR Categories in Chronic Kidney Disease (CKD)              GFR Category          GFR (mL/min/1.73)    Interpretation  G1                    90 or greater        Normal or high (1)  G2                    60-89                Mild decrease (1)  G3a                   45-59                Mild to moderate decrease  G3b                   30-44                Moderate to severe decrease  G4                    15-29                Severe decrease  G5                    14 or less           Kidney failure    (1)In the absence of evidence of kidney disease, neither GFR category G1 or G2 fulfill the criteria for CKD.    eGFR calculation 2021 CKD-EPI creatinine equation, which does not include race as a factor    Lipase [[id number]]  (Normal) Collected: 06/06/25 0753    Specimen: Blood Updated: 06/06/25 0831     Lipase 39 U/L     CBC & Differential [[id number]]  (Abnormal) Collected: 06/06/25 0753    Specimen: Blood Updated: 06/06/25 0805    Narrative:      The following orders were created for panel order CBC & Differential.  Procedure                               Abnormality         Status                     ---------                               -----------          ------                     CBC Auto Differential[[id number]]        Abnormal            Final result                 Please view results for these tests on the individual orders.    CBC Auto Differential [[id number]]  (Abnormal) Collected: 06/06/25 0753    Specimen: Blood Updated: 06/06/25 0805     WBC 5.36 10*3/mm3      RBC 5.30 10*6/mm3      Hemoglobin 14.8 g/dL      Hematocrit 45.0 %      MCV 84.9 fL      MCH 27.9 pg      MCHC 32.9 g/dL      RDW 12.5 %      RDW-SD 38.1 fl      MPV 9.7 fL      Platelets 214 10*3/mm3      Neutrophil % 57.7 %      Lymphocyte % 31.0 %      Monocyte % 9.0 %      Eosinophil % 1.7 %      Basophil % 0.4 %      Immature Grans % 0.2 %      Neutrophils, Absolute 3.10 10*3/mm3      Lymphocytes, Absolute 1.66 10*3/mm3      Monocytes, Absolute 0.48 10*3/mm3      Eosinophils, Absolute 0.09 10*3/mm3      Basophils, Absolute 0.02 10*3/mm3      Immature Grans, Absolute 0.01 10*3/mm3      nRBC 0.0 /100 WBC     Urinalysis With Culture If Indicated - Urine, Clean Catch [[id number]]  (Abnormal) Collected: 06/06/25 0754    Specimen: Urine, Clean Catch Updated: 06/06/25 0805     Color, UA Yellow     Appearance, UA Turbid     pH, UA 8.5     Specific Gravity, UA 1.019     Glucose, UA Negative     Ketones, UA Negative     Bilirubin, UA Negative     Blood, UA Negative     Protein, UA 30 mg/dL (1+)     Leuk Esterase, UA Trace     Nitrite, UA Negative     Urobilinogen, UA 1.0 E.U./dL    Narrative:      In absence of clinical symptoms, the presence of pyuria, bacteria, and/or nitrites on the urinalysis result does not correlate with infection.    Urinalysis, Microscopic Only - Urine, Clean Catch [[id number]]  (Abnormal) Collected: 06/06/25 0754    Specimen: Urine, Clean Catch Updated: 06/06/25 0805     RBC, UA 0-2 /HPF      WBC, UA 3-5 /HPF      Comment: Urine culture not indicated.        Bacteria, UA None Seen /HPF      Squamous Epithelial Cells, UA 0-2 /HPF      Hyaline Casts, UA None Seen /LPF      Methodology  Automated Microscopy    Extra Tubes [[id number]] Collected: 06/06/25 0753    Specimen: Blood, Venous Line Updated: 06/06/25 0801    Narrative:      The following orders were created for panel order Extra Tubes.  Procedure                               Abnormality         Status                     ---------                               -----------         ------                     Gold Top - SST[[id number]]                                   Final result               Light Blue Top[[id number]]                                   Final result                 Please view results for these tests on the individual orders.    Gold Top - SST [[id number]] Collected: 06/06/25 0753    Specimen: Blood Updated: 06/06/25 0801     Extra Tube Hold for add-ons.     Comment: Auto resulted.       Light Blue Top [[id number]] Collected: 06/06/25 0753    Specimen: Blood Updated: 06/06/25 0801     Extra Tube Hold for add-ons.     Comment: Auto resulted               Imaging Results (Most Recent)       Procedure Component Value Units Date/Time    CT Abdomen Pelvis Without Contrast [[id number]] Collected: 06/07/25 0136     Updated: 06/07/25 0143    Narrative:      CT ABDOMEN PELVIS WO CONTRAST    Date of Exam: 6/7/2025 12:49 AM EDT    Indication: stabbing back pain/n/v, r/o pyelonephritis.    Comparison: 11/22/2024    Technique: Axial CT images were obtained of the abdomen and pelvis without the administration of contrast. Sagittal and coronal reconstructions were performed.  Automated exposure control and iterative reconstruction methods were used.    Findings:  Lung bases are clear. Limited exam without contrast. Gallbladder appears grossly normal. No biliary obstruction is seen. No renal or ureteral stones. No hydronephrosis. No perinephric stranding to suggest acute infection/inflammation. The unenhanced   solid abdominal organs are normal. Urinary bladder is normal. Uterus is surgically absent.    The appendix is normal. Large bowel is  normal without evidence of acute colitis. No small bowel obstruction is identified. Stomach is within normal limits. No adenopathy or free fluid is identified.      Impression:      1.No acute findings in the abdomen or pelvis.  2.No renal or ureteral stones. No hydronephrosis. No perinephric stranding to suggest acute infection/inflammation.  3.Hysterectomy.  4.Normal GI tract including the appendix.        Electronically Signed: Bowen Wing MD    6/7/2025 1:41 AM EDT    Workstation ID: [id number]          reviewed    ECG/EMG Results (most recent)       None          reviewed        No results found for this or any previous visit.      Microbiology Results (last 10 days)       ** No results found for the last 240 hours. **            Assessment & Plan     Urinary tract infection without hematuria, site unspecified          UTI  Nausea, vomiting and flank/back pain   - CMP, lipase, CBC unremarkable  - UA on 6/6 is negative but patient reports a recent diagnosis of UTI and has been treated with cipro, however not tolerating due to n/v  -Gentamicin x 1 given in ED and per pharmacy only needs one dose.   -IVF  -CT a/p showed no acute findings   -Antiemetics and analgesics as needed  -Clear liquid diet, advanced to regular and tolerated well   -Zofran, scopolamine and flexeril sent at [facility].         Obesity  -BMI 35  -Lifestyle modifications    I discussed the patients findings and my recommendations with patient and nursing staff.     Discharge Diagnosis:      Urinary tract infection without hematuria, site unspecified      Hospital Course  Patient is a 30 y.o. female presented with nausea, vomiting and diarrhea as noted in HPI above.  She was recently diagnosed with UTI and prescribed Cipro.  She thinks that symptoms are related to adverse reaction as she reports multiple allergies.  She was given gentamicin x 1 in the ED and admitted to the observation unit for IV fluids and antiemetics.  CT abdomen and pelvis  completed due to report of back pain and it showed no acute findings.  She was started on clear liquid diet and advance to regular which she tolerated well.  She was discharged the day following admission on Zofran, scopolamine patch and Flexeril as needed. At this time, patient felt to be in good condition for discharge with close follow up with PCP. Instructed to take all medications as prescribed and to return to ED if any concerning signs/symptoms. All test/lab results were discussed with patient. All questions were answered and patient verbalizes understanding.   .      Past Medical History:     Past Medical History:   Diagnosis Date    ADHD 1995    Anesthesia complication     wakes up aggressive    Anxiety 2013    Asthma 2004    Back pain 2020    Back problem     Claustrophobia     Depression 2017    Headache 2013    PONV (postoperative nausea and vomiting)     Visual impairment 2003       Past Surgical History:     Past Surgical History:   Procedure Laterality Date    FOOT SURGERY Left 2009    left foot had non cancer mass    FOOT SURGERY Left 2010    left foot had non cancer mass again    NOSE SURGERY Bilateral 08/17/2021    SINUS SURGERY  2021    TONSILLECTOMY AND ADENOIDECTOMY Bilateral 12/2020    TOTAL LAPAROSCOPIC HYSTERECTOMY SALPINGECTOMY Bilateral 9/25/2024    Procedure: TOTAL LAPAROSCOPIC HYSTERECTOMY BILATERAL SALPINGECTOMY WITH DAVINCI ROBOT, NEXPLANON REMOVAL;  Surgeon: Kimberly Moses MD;  Location: [facility] OR;  Service: Robotics - DaVinci;  Laterality: Bilateral;    WISDOM TOOTH EXTRACTION Bilateral 2017       Social History:   Social History     Socioeconomic History    Marital status:     Number of children: 0   Tobacco Use    Smoking status: Never    Smokeless tobacco: Never    Tobacco comments:     Exposed to second hand smoke as a child   Vaping Use    Vaping status: Never Used   Substance and Sexual Activity    Alcohol use: Yes     Comment: only while on vacation    Drug use:  Never    Sexual activity: Yes     Partners: Male     Birth control/protection: Implant     Comment: [employer]pl[employer]       Procedures Performed         Consults:   Consults       No orders found for last 30 day(s).            Condition on Discharge:     Stable    Discharge Disposition  Home or Self Care    Discharge Medications     Discharge Medications        New Medications        Instructions Start Date   cyclobenzaprine 10 MG tablet  Commonly known as: FLEXERIL   10 mg, Oral, 3 Times Daily PRN      Lidocaine 4 %   2 patches, Transdermal, Every 24 Hours Scheduled, Remove & Discard patch within 12 hours or as directed by MD   Start Date: June 8, 2025     Scopolamine 1 MG/3DAYS patch   1 patch, Transdermal, Every 72 Hours   Start Date: June 9, 2025            Continue These Medications        Instructions Start Date   amphetamine-dextroamphetamine 10 MG tablet  Commonly known as: Adderall   10 mg, Oral, 2 Times Daily      ondansetron ODT 4 MG disintegrating tablet  Commonly known as: ZOFRAN-ODT   4 mg, Translingual, Every 8 Hours PRN      pregabalin 50 MG capsule  Commonly known as: LYRICA   Take 1 capsule by mouth 3 (Three) Times a Day.  Max Daily Amount: 150 mg      Trintellix 20 MG tablet  Generic drug: Vortioxetine HBr   20 mg, Oral, Daily With Breakfast      Ventolin  (90 Base) MCG/ACT inhaler  Generic drug: albuterol sulfate HFA   2 puffs, Inhalation, Every 4 Hours PRN      vitamin D 1.25 MG (38152 UT) capsule capsule  Commonly known as: ERGOCALCIFEROL   50,000 Units, Weekly      Zepbound 7.5 MG/0.5ML solution  Generic drug: Tirzepatide-Weight Management   7.5 mg, Subcutaneous, Weekly             Stop These Medications      ciprofloxacin 500 MG tablet  Commonly known as: Cipro              Discharge Diet:   Diet Instructions       Diet: Regular/House Diet; Regular (IDDSI 7); Thin (IDDSI 0)      Discharge Diet: Regular/House Diet    Texture: Regular (IDDSI 7)    Fluid Consistency: Thin (IDDSI 0)             Activity at Discharge:     Follow-up Appointments  Future Appointments   Date Time Provider Department Center   7/1/2025 10:00 AM Kell Metz APRN [facility] None   9/11/2025  8:00 AM Rae Chatterjee MD [facility]     Additional Instructions for the Follow-ups that You Need to Schedule       Discharge Follow-up with PCP   As directed       Currently Documented PCP:    No primary care provider on file.    PCP Phone Number:    None     Follow Up Details: 5-7 days                Test Results Pending at Discharge  Pending Results       None             Risk for Readmission (LACE) Score: 3 (6/7/2025  6:00 AM)      Greater than 30 minutes spent in discharge activities for this patient    Signature:Electronically signed by KRISTOPHER Bob, 06/07/25, 11:47 AM EDT.

## 2025-06-07 NOTE — PLAN OF CARE
Goal Outcome Evaluation:  Plan of Care Reviewed With: patient        Progress: improving   Pt to be discharge to home, pt to  meds at preferred pharmacy and to follow up with PCP. Discharge instructions given, understanding verified. Pt ambulatory accompanied by family member to the lobby.

## 2025-06-17 DIAGNOSIS — F90.0 ADHD, PREDOMINANTLY INATTENTIVE TYPE: Chronic | ICD-10-CM

## 2025-06-17 RX ORDER — DEXTROAMPHETAMINE SACCHARATE, AMPHETAMINE ASPARTATE, DEXTROAMPHETAMINE SULFATE AND AMPHETAMINE SULFATE 2.5; 2.5; 2.5; 2.5 MG/1; MG/1; MG/1; MG/1
10 TABLET ORAL 2 TIMES DAILY
Qty: 60 TABLET | Refills: 0 | Status: CANCELLED | OUTPATIENT
Start: 2025-06-17 | End: 2026-06-17

## 2025-06-29 DIAGNOSIS — F90.0 ADHD, PREDOMINANTLY INATTENTIVE TYPE: Chronic | ICD-10-CM

## 2025-07-01 ENCOUNTER — OFFICE VISIT (OUTPATIENT)
Dept: BARIATRICS/WEIGHT MGMT | Facility: CLINIC | Age: 30
End: 2025-07-01
Payer: COMMERCIAL

## 2025-07-01 VITALS
BODY MASS INDEX: 33.63 KG/M2 | WEIGHT: 197 LBS | HEART RATE: 90 BPM | HEIGHT: 64 IN | SYSTOLIC BLOOD PRESSURE: 120 MMHG | DIASTOLIC BLOOD PRESSURE: 77 MMHG | TEMPERATURE: 98.4 F

## 2025-07-01 DIAGNOSIS — Z71.3 DIETARY COUNSELING: ICD-10-CM

## 2025-07-01 DIAGNOSIS — E66.811 OBESITY, CLASS I, BMI 30-34.9: Primary | ICD-10-CM

## 2025-07-01 PROCEDURE — 99213 OFFICE O/P EST LOW 20 MIN: CPT | Performed by: NURSE PRACTITIONER

## 2025-07-01 RX ORDER — DEXTROAMPHETAMINE SACCHARATE, AMPHETAMINE ASPARTATE, DEXTROAMPHETAMINE SULFATE AND AMPHETAMINE SULFATE 2.5; 2.5; 2.5; 2.5 MG/1; MG/1; MG/1; MG/1
10 TABLET ORAL 2 TIMES DAILY
Qty: 60 TABLET | Refills: 0 | Status: SHIPPED | OUTPATIENT
Start: 2025-07-01 | End: 2026-07-01

## 2025-07-01 NOTE — PROGRESS NOTES
[facility] BARIATRIC SURGERY  [address]  [facility] [id number] [phone]  [address]  [facility] [id number] [phone]  Dept: [phone]  7/1/2025      Anayeli Burdick.  [id number]  [id number]  1995  female      Chief Complaint   Patient presents with    Follow-up     Fup compound       [facility] Weight Loss Medicine:   Anayeli Burdick presents today for follow up today for provider supervised medical weight loss.    HPI:   Today's weight is 89.4 kg (197 lb) pounds, today's BMI is Body mass index is 33.8 kg/m²., she has a  loss of 10 pounds since the last visit.     Anayeli Burdick denies n/v/r/d and reports tolerating regular diet.  Frustrated with weight loss.  Frustrated with excess skin.  Getting some more sleep.  Working 6 days in 2 weeks.  Boyfriend moved in to help pay bills.  No issues with the Zepbound.  N/v/r at times.  C/o bloating since hysterectomy.  Had UTI and hospitalized for 2 days due to reaction.   Heartburn, nausea, dizziness since hospitalized.  Was on Omeprazole.  Too dry.  Pepcid makes too dry as well.  Not drinking water due to nausea.  Taking a lot of zofran.  Feeling benefits on the 7.5 mg dose.  Would like to go up in dose.    Craving salty. Hasn't been eating many sweets.  Only once per week.       Diet and Exercise: Diet history reviewed and discussed with the patient. Weight loss/gains to date discussed with the patient. The patient states they are eating unknown grams of protein per day. She reports eating 2 meals per day, a typical portion size of 1/2-1 cup, eating 0-1 snacks per day, drinking 3 or more 8-oz. glasses of water per day, no carbonated beverage consumption and exercising regularly.         Review of Systems   Constitutional:  Positive for activity change and appetite change.   Respiratory:  Negative for shortness of breath.    Cardiovascular:  Negative for chest pain and  palpitations.   All other systems reviewed and are negative.      Patient Active Problem List   Diagnosis    ADHD, predominantly inattentive type    Asthma    Major depressive disorder, recurrent episode, moderate    PTSD (post-traumatic stress disorder)    Chronic back pain    Dietary counseling    Obesity, Class I, BMI 30-34.9    Pain in thoracic spine    Neck pain    Allergic reaction    Abnormal uterine bleeding    Dysmenorrhea    Chronic pelvic pain in female    Migraine without status migrainosus, not intractable    Chronic fatigue    Chronic nausea    Obesity, Class II, BMI 35-39.9    Urinary tract infection without hematuria, site unspecified       Past Medical History:   Diagnosis Date    ADHD 1995    Anesthesia complication     wakes up aggressive    Anxiety 2013    Asthma 2004    Back pain 2020    Back problem     Claustrophobia     Depression 2017    Headache 2013    PONV (postoperative nausea and vomiting)     Visual impairment 2003       The following portions of the patient's history were reviewed and updated as appropriate: allergies, current medications, past medical history, past surgical history, and problem list.    Vitals:    07/01/25 1012   BP: 120/77   Pulse: 90   Temp: 98.4 °F (36.9 °C)       Physical Exam  Vitals reviewed.   Constitutional:       General: She is not in acute distress.     Appearance: Normal appearance. She is obese.   HENT:      Head: Normocephalic and atraumatic.      Mouth/Throat:      Mouth: Mucous membranes are moist.      Pharynx: Oropharynx is clear.   Eyes:      General: No scleral icterus.     Extraocular Movements: Extraocular movements intact.      Conjunctiva/sclera: Conjunctivae normal.      Pupils: Pupils are equal, round, and reactive to light.   Cardiovascular:      Rate and Rhythm: Normal rate and regular rhythm.   Pulmonary:      Effort: Pulmonary effort is normal. No respiratory distress.   Abdominal:      General: Bowel sounds are normal.      Palpations:  Abdomen is soft.   Musculoskeletal:         General: Normal range of motion.      Cervical back: Normal range of motion and neck supple.   Skin:     General: Skin is warm and dry.   Neurological:      General: No focal deficit present.      Mental Status: She is alert and oriented to person, place, and time.   Psychiatric:         Mood and Affect: Mood normal.         Behavior: Behavior normal.         Thought Content: Thought content normal.         Judgment: Judgment normal.         Assessment:   The patient is tolerating Zepbound and would like to increase her dose.       Plan:   Diagnoses and all orders for this visit:    1. Obesity, Class I, BMI 30-34.9 (Primary)    2. Dietary counseling    Other orders  -     Tirzepatide-Weight Management (ZEPBOUND) 10 MG/0.5ML solution; Inject 0.5 mL under the skin into the appropriate area as directed 1 (One) Time Per Week.  Dispense: 2 mL; Refill: 3      If symptoms worsen, may need to decrease.    Increase water intake  Increase protein intake  Encouraged patient to be sure to get plenty of lean protein per day through small frequent meals all with a protein source.   Activity restrictions: none.   Recommended patient be sure to get at least 70 grams of protein per day by eating small, frequent meals all with high lean protein choices. Be sure to limit/cut back on daily carbohydrate intake. Discussed with the patient the recommended amount of water per day to intake- half of body weight in ounces. Reviewed vitamin requirements. Be sure to do routine exercise, 150 minutes per week minimum, including both cardio and strength training.     Instructions / Recommendations: dietary counseling recommended, recommended a daily protein intake of  grams, vitamin supplement(s) recommended, recommended exercising at least 150 minutes per week, behavior modifications recommended and instructed to call the office for concerns, questions, or problems.     The patient was instructed  to follow up in 3-4 months.     The patient was counseled regarding. Total time spent during this encounter today was 25 minutes.

## 2025-07-30 ENCOUNTER — APPOINTMENT (OUTPATIENT)
Dept: GENERAL RADIOLOGY | Facility: HOSPITAL | Age: 30
End: 2025-07-30
Payer: COMMERCIAL

## 2025-07-30 ENCOUNTER — HOSPITAL ENCOUNTER (EMERGENCY)
Facility: HOSPITAL | Age: 30
Discharge: HOME | End: 2025-07-30
Attending: EMERGENCY MEDICINE | Admitting: EMERGENCY MEDICINE
Payer: COMMERCIAL

## 2025-07-30 VITALS
WEIGHT: 196.1 LBS | SYSTOLIC BLOOD PRESSURE: 100 MMHG | RESPIRATION RATE: 16 BRPM | BODY MASS INDEX: 33.48 KG/M2 | HEART RATE: 74 BPM | TEMPERATURE: 98 F | DIASTOLIC BLOOD PRESSURE: 58 MMHG | HEIGHT: 64 IN | OXYGEN SATURATION: 100 %

## 2025-07-30 DIAGNOSIS — R19.7 DIARRHEA, UNSPECIFIED TYPE: Primary | ICD-10-CM

## 2025-07-30 LAB
BASOPHILS # BLD AUTO: 0.01 10*3/MM3 (ref 0–0.2)
BASOPHILS NFR BLD AUTO: 0.2 % (ref 0–1.5)
BUN BLDA-MCNC: 11 MG/DL (ref 8–26)
CA-I BLDA-SCNC: 1.18 MMOL/L (ref 1.15–1.33)
CHLORIDE BLDA-SCNC: 108 MMOL/L (ref 98–109)
CO2 BLDA-SCNC: 25.3 MMOL/L (ref 23–27)
CREAT BLDA-MCNC: 0.7 MG/DL (ref 0.6–1.3)
DEPRECATED RDW RBC AUTO: 41 FL (ref 37–54)
EOSINOPHIL # BLD AUTO: 0.15 10*3/MM3 (ref 0–0.4)
EOSINOPHIL NFR BLD AUTO: 2.6 % (ref 0.3–6.2)
ERYTHROCYTE [DISTWIDTH] IN BLOOD BY AUTOMATED COUNT: 12.9 % (ref 12.3–15.4)
GLUCOSE BLDC GLUCOMTR-MCNC: 72 MG/DL (ref 74–100)
HCT VFR BLD AUTO: 40.8 % (ref 34–46.6)
HGB BLD-MCNC: 13.5 G/DL (ref 12–15.9)
IMM GRANULOCYTES # BLD AUTO: 0 10*3/MM3 (ref 0–0.05)
IMM GRANULOCYTES NFR BLD AUTO: 0 % (ref 0–0.5)
LYMPHOCYTES # BLD AUTO: 1.51 10*3/MM3 (ref 0.7–3.1)
LYMPHOCYTES NFR BLD AUTO: 26.5 % (ref 19.6–45.3)
MCH RBC QN AUTO: 28.5 PG (ref 26.6–33)
MCHC RBC AUTO-ENTMCNC: 33.1 G/DL (ref 31.5–35.7)
MCV RBC AUTO: 86.3 FL (ref 79–97)
MONOCYTES # BLD AUTO: 0.51 10*3/MM3 (ref 0.1–0.9)
MONOCYTES NFR BLD AUTO: 9 % (ref 5–12)
NEUTROPHILS NFR BLD AUTO: 3.51 10*3/MM3 (ref 1.7–7)
NEUTROPHILS NFR BLD AUTO: 61.7 % (ref 42.7–76)
PLATELET # BLD AUTO: 210 10*3/MM3 (ref 140–450)
PMV BLD AUTO: 9.6 FL (ref 6–12)
POTASSIUM BLDA-SCNC: 3.8 MMOL/L (ref 3.5–4.5)
RBC # BLD AUTO: 4.73 10*6/MM3 (ref 3.77–5.28)
SODIUM BLD-SCNC: 142 MMOL/L (ref 138–146)
WBC NRBC COR # BLD AUTO: 5.69 10*3/MM3 (ref 3.4–10.8)

## 2025-07-30 PROCEDURE — 85025 COMPLETE CBC W/AUTO DIFF WBC: CPT

## 2025-07-30 PROCEDURE — 99283 EMERGENCY DEPT VISIT LOW MDM: CPT | Performed by: EMERGENCY MEDICINE

## 2025-07-30 PROCEDURE — 99283 EMERGENCY DEPT VISIT LOW MDM: CPT

## 2025-07-30 PROCEDURE — 74018 RADEX ABDOMEN 1 VIEW: CPT

## 2025-07-30 PROCEDURE — 80047 BASIC METABLC PNL IONIZED CA: CPT

## 2025-07-30 PROCEDURE — 25810000003 SODIUM CHLORIDE 0.9 % SOLUTION

## 2025-07-30 RX ORDER — LOPERAMIDE HYDROCHLORIDE 2 MG/1
2 CAPSULE ORAL 2 TIMES DAILY PRN
Qty: 10 CAPSULE | Refills: 0 | Status: SHIPPED | OUTPATIENT
Start: 2025-07-30

## 2025-07-30 RX ORDER — SODIUM CHLORIDE 0.9 % (FLUSH) 0.9 %
10 SYRINGE (ML) INJECTION AS NEEDED
Status: DISCONTINUED | OUTPATIENT
Start: 2025-07-30 | End: 2025-07-30 | Stop reason: HOSPADM

## 2025-07-30 RX ADMIN — SODIUM CHLORIDE 1000 ML: 9 INJECTION, SOLUTION INTRAVENOUS at 01:52

## 2025-07-30 NOTE — FSED PROVIDER NOTE
Subjective   History of Present Illness  30-year-old girl presents emergency department taking Macrobid for urine infection.  Has been having diarrhea for between 13 and 17 days.  She stopped the antibiotic.  No blood.  She is able to tolerate oral intake.  No history of immune compromised bloody diarrhea or colitis symptoms.  She is otherwise stable  Review of Systems  All systems negative except as otherwise mentioned in the HPI  Past Medical History:   Diagnosis Date    ADHD 1995    Anesthesia complication     wakes up aggressive    Anxiety 2013    Asthma 2004    Back pain 2020    Back problem     Claustrophobia     Depression 2017    Headache 2013    PONV (postoperative nausea and vomiting)     Visual impairment 2003       Allergies   Allergen Reactions    Adhesive Tape Itching, Rash and Swelling    Aripiprazole Anxiety and Mental Status Change    Aspirin Itching, Swelling and GI Bleeding    Bee Venom Anaphylaxis, Hives, Itching, Swelling and Rash    Ct Contrast Hives    Diclofenac Hives, Itching, Rash and Swelling     Other reaction(s): GI Bleeding    Diflucan [Fluconazole] Anaphylaxis    Ibuprofen Itching, Swelling, Rash and GI Bleeding    Iodine Hives, Itching, Swelling and Rash    Keflex [Cephalexin] Itching, Swelling, Rash and GI Bleeding    Latex Hives, Itching, Swelling and Rash    Levofloxacin Hives, Nausea And Vomiting and Swelling    Oxycodone-Acetaminophen Hives, Itching, Swelling, Rash and GI Bleeding    Paprika Anaphylaxis, GI Intolerance and Hives    Penicillins Itching, Swelling, Rash and GI Bleeding    Pepper Anaphylaxis, GI Intolerance and Hives    Promethazine Hives, Itching, Swelling and Rash    Qelbree [Viloxazine] GI Intolerance    Quetiapine Anxiety, Mental Status Change and Confusion    Shrimp Anaphylaxis, Hives, Itching, Swelling and Rash    Sulfa Antibiotics Hives, Itching, Nausea And Vomiting and Rash    Tramadol Swelling, Rash and GI Bleeding    Ciprofloxacin Other (See Comments)     Piper Other (See Comments)    Gabapentin Palpitations     And dizziness        Past Surgical History:   Procedure Laterality Date    FOOT SURGERY Left 2009    left foot had non cancer mass    FOOT SURGERY Left 2010    left foot had non cancer mass again    NOSE SURGERY Bilateral 08/17/2021    SINUS SURGERY  2021    TONSILLECTOMY AND ADENOIDECTOMY Bilateral 12/2020    TOTAL LAPAROSCOPIC HYSTERECTOMY SALPINGECTOMY Bilateral 9/25/2024    Procedure: TOTAL LAPAROSCOPIC HYSTERECTOMY BILATERAL SALPINGECTOMY WITH DAVINCI ROBOT, NEXPLANON REMOVAL;  Surgeon: Kimberly Moses MD;  Location: [facility] MAIN OR;  Service: Robotics - DaVinci;  Laterality: Bilateral;    WISDOM TOOTH EXTRACTION Bilateral 2017       Family History   Problem Relation Age of Onset    Arthritis Mother     Multiple sclerosis Mother     Thyroid disease Mother     Calcium disorder Mother     Cataracts Mother     Cancer Mother     Thyroid nodules Mother     Stroke Mother     Osteoporotic fracture Mother     Hypertension Father     Heart attack Father     Heart disease Father     Parkinsonism Father     Abnormal EKG Father     Allergies Father     Post-traumatic stress disorder Father     Stroke Father     Thyroid disease Brother     Calcium disorder Brother     Vision loss Brother     Kidney disease Maternal Grandmother     Kidney failure Maternal Grandmother     Cancer Maternal Grandfather     Throat cancer Maternal Grandfather     Stroke Paternal Grandmother     Heart disease Paternal Grandmother     Cancer Paternal Grandfather        Social History     Socioeconomic History    Marital status:     Number of children: 0   Tobacco Use    Smoking status: Never    Smokeless tobacco: Never    Tobacco comments:     Exposed to second hand smoke as a child   Vaping Use    Vaping status: Never Used   Substance and Sexual Activity    Alcohol use: Yes     Comment: only while on vacation    Drug use: Never    Sexual activity: Yes     Partners: Male     Birth  control/protection: Implant     Comment: [place]           Objective   Physical Exam  General: Alert and oriented, conversant  Eye: PERRL, EOMI, nomal conjunctiva  HENT: Normocephalic, normal hearing, moist oral mucosa    Lungs: Nonlabored respiration, no wheezing  Heart: Normal Rate, no mumurs gallops or rubs  Abdomen: Soft, Non tender, no peritoneal signs    Musculoskeletal: Normal range of motion and strength, no tenderness or swelling  Skin: Warm and dry, no alarming rashes  Neurologic: Awake, responsive, moving all extremities, no focal deficits  Psychiatric:  Cooperative, appropriate mood and affect  Procedures           ED Course                                           Medical Decision Making  Problems Addressed:  Diarrhea, unspecified type: complicated acute illness or injury    Amount and/or Complexity of Data Reviewed  Labs: ordered.  Radiology: ordered.    Risk  Prescription drug management.    3-year-old girl presents emergency department with diarrhea after taking Macrobid.  Vital signs are stable she is not tachycardic she is not hypotensive.  She is hemodynamically stable abdomen is soft and appears well abdominal x-ray shows no acute process.  Her liver enzyme machine is down but that other laboratory studies are largely unremarkable.  Patient stable for discharge home will give my usual guidance and some Imodium and advised her to take it very sparingly    Final diagnoses:   Diarrhea, unspecified type       ED Disposition  ED Disposition       ED Disposition   Discharge    Condition   Stable    Comment   --               Provider, No Known  [place] [id number] [phone]               Medication List        New Prescriptions      loperamide 2 MG capsule  Commonly known as: IMODIUM  Take 1 capsule by mouth 2 (Two) Times a Day As Needed for Diarrhea.               Where to Get Your Medications        These medications were sent to [facility]/pharmacy #[id number] - [place], [place] -  [address] - [phone]  - [phone]  [address] [facility] [place] [address]      Hours: 24-hours Phone: [phone]   loperamide 2 MG capsule

## (undated) DEVICE — MANIP UTER RUMI 2 KOH EFFICIENT 3.5CM BL

## (undated) DEVICE — TROC BLADLES ANCHORPORT/OPTI LP 12X120MM 1P/U

## (undated) DEVICE — UNDRGLV SURG BIOGEL PIMICROINDICATOR SYNTH SZ6.5PF STRL PR/2

## (undated) DEVICE — DRAPE SHEET ULTRAGARD: Brand: MEDLINE

## (undated) DEVICE — SUT MNCRYL PLS ANTIB UD 4/0 PS2 18IN

## (undated) DEVICE — TOTAL TRAY, DB, 100% SILI FOLEY, 16FR 10: Brand: MEDLINE

## (undated) DEVICE — VESSEL SEALER EXTEND: Brand: ENDOWRIST

## (undated) DEVICE — MANIP UTER RUMI TP 6.7MMX8CM BLU

## (undated) DEVICE — 3M™ STERI-STRIP™ REINFORCED ADHESIVE SKIN CLOSURES, R1547, 1/2 IN X 4 IN (12 MM X 100 MM), 6 STRIPS/ENVELOPE: Brand: 3M™ STERI-STRIP™

## (undated) DEVICE — 2, DISPOSABLE SUCTION/IRRIGATOR WITH DISPOSABLE TIP: Brand: STRYKEFLOW

## (undated) DEVICE — SYR LUERLOK 50ML

## (undated) DEVICE — ADHS LIQ MASTISOL 2/3ML

## (undated) DEVICE — SHEET,DRAPE,53X77,STERILE: Brand: MEDLINE

## (undated) DEVICE — KT SURG TURNOVER 050

## (undated) DEVICE — PK GYN LAPAROSCOPY 50

## (undated) DEVICE — SOLUTION,WATER,IRRIGATION,1000ML,STERILE: Brand: MEDLINE

## (undated) DEVICE — ARM DRAPE

## (undated) DEVICE — COLUMN DRAPE

## (undated) DEVICE — BLADELESS OBTURATOR: Brand: WECK VISTA

## (undated) DEVICE — SUT MNCRYL 4/0 PS2 27IN UD MCP426H

## (undated) DEVICE — PK POSTN TRENGUARD450 PROC

## (undated) DEVICE — DRAPE,UTILITY,XL,4/PK,STERILE: Brand: MEDLINE

## (undated) DEVICE — MARKR SKIN W/RULR

## (undated) DEVICE — ENDOPATH XCEL WITH OPTIVIEW TECHNOLOGY BLADELESS TROCARS WITH STABILITY SLEEVES: Brand: ENDOPATH XCEL OPTIVIEW

## (undated) DEVICE — GLV SURG SIGNATURE ESSENTIAL PF LTX SZ6.5

## (undated) DEVICE — CANNULA SEAL

## (undated) DEVICE — ANTIBACTERIAL UNDYED BRAIDED (POLYGLACTIN 910), SYNTHETIC ABSORBABLE SUTURE: Brand: COATED VICRYL

## (undated) DEVICE — ANTIBACTERIAL VIOLET BRAIDED (POLYGLACTIN 910), SYNTHETIC ABSORBABLE SUTURE: Brand: COATED VICRYL

## (undated) DEVICE — SOL IRRIG NACL 1000ML

## (undated) DEVICE — LAPAROVUE VISIBILITY SYSTEM LAPAROSCOPIC SOLUTIONS: Brand: LAPAROVUE

## (undated) DEVICE — COVER,MAYO STAND,STERILE: Brand: MEDLINE

## (undated) DEVICE — TIP COVER ACCESSORY

## (undated) DEVICE — ST TBG AIRSEAL FLTR TRI LUM